# Patient Record
Sex: FEMALE | Race: WHITE | NOT HISPANIC OR LATINO | Employment: FULL TIME | URBAN - METROPOLITAN AREA
[De-identification: names, ages, dates, MRNs, and addresses within clinical notes are randomized per-mention and may not be internally consistent; named-entity substitution may affect disease eponyms.]

---

## 2018-07-02 ENCOUNTER — TRANSFERRED RECORDS (OUTPATIENT)
Dept: HEALTH INFORMATION MANAGEMENT | Facility: CLINIC | Age: 53
End: 2018-07-02

## 2018-07-04 LAB
ALBUMIN SERPL-MCNC: 4.2 G/DL (ref 3.5–5.5)
ALP SERPL-CCNC: 89 U/L (ref 39–117)
ALT SERPL-CCNC: 18 U/L (ref 0–32)
ANION GAP SERPL CALCULATED.3IONS-SCNC: ABNORMAL MMOL/L
AST SERPL-CCNC: 21 U/L (ref 0–40)
BILIRUB SERPL-MCNC: <0.2 MG/DL (ref 0–1.2)
BUN SERPL-MCNC: 14 MG/DL (ref 6–24)
CALCIUM SERPL-MCNC: 9.2 MG/DL (ref 8.7–10.2)
CHLORIDE SERPLBLD-SCNC: 102 MMOL/L (ref 96–106)
CO2 SERPL-SCNC: 21 MMOL/L (ref 20–29)
CREAT SERPL-MCNC: 0.78 MG/DL (ref 0.57–1)
GFR SERPL CREATININE-BSD FRML MDRD: ABNORMAL ML/MIN/1.73M2
GLUCOSE SERPL-MCNC: 115 MG/DL (ref 65–99)
POTASSIUM SERPL-SCNC: 4.2 MMOL/L (ref 3.5–5.2)
PROT SERPL-MCNC: 7.1 G/DL (ref 6–8.5)
SODIUM SERPL-SCNC: 141 MMOL/L (ref 134–144)
T3 SERPL-MCNC: 105 NG/DL (ref 71–180)
THYROXINE (T4) FREE, DIRECT S: 4.9 (ref 4.5–12)

## 2018-09-10 ENCOUNTER — TRANSFERRED RECORDS (OUTPATIENT)
Dept: HEALTH INFORMATION MANAGEMENT | Facility: CLINIC | Age: 53
End: 2018-09-10

## 2018-09-17 ENCOUNTER — HEALTH MAINTENANCE LETTER (OUTPATIENT)
Age: 53
End: 2018-09-17

## 2018-09-27 ENCOUNTER — CARE COORDINATION (OUTPATIENT)
Dept: CARDIOLOGY | Facility: CLINIC | Age: 53
End: 2018-09-27

## 2018-09-27 ENCOUNTER — OFFICE VISIT (OUTPATIENT)
Dept: CARDIOLOGY | Facility: CLINIC | Age: 53
End: 2018-09-27
Payer: COMMERCIAL

## 2018-09-27 VITALS
SYSTOLIC BLOOD PRESSURE: 142 MMHG | OXYGEN SATURATION: 97 % | BODY MASS INDEX: 41.95 KG/M2 | HEART RATE: 76 BPM | WEIGHT: 293 LBS | DIASTOLIC BLOOD PRESSURE: 92 MMHG | HEIGHT: 70 IN

## 2018-09-27 DIAGNOSIS — R40.0 DAYTIME SOMNOLENCE: ICD-10-CM

## 2018-09-27 DIAGNOSIS — R06.83 SNORING: ICD-10-CM

## 2018-09-27 DIAGNOSIS — I10 BENIGN ESSENTIAL HYPERTENSION: ICD-10-CM

## 2018-09-27 DIAGNOSIS — R00.2 PALPITATIONS: Primary | ICD-10-CM

## 2018-09-27 DIAGNOSIS — E03.9 HYPOTHYROIDISM, UNSPECIFIED TYPE: ICD-10-CM

## 2018-09-27 PROCEDURE — 93000 ELECTROCARDIOGRAM COMPLETE: CPT | Performed by: INTERNAL MEDICINE

## 2018-09-27 PROCEDURE — 99205 OFFICE O/P NEW HI 60 MIN: CPT | Mod: 25 | Performed by: INTERNAL MEDICINE

## 2018-09-27 RX ORDER — PHENTERMINE HYDROCHLORIDE 37.5 MG/1
37.5 CAPSULE ORAL EVERY MORNING
COMMUNITY
End: 2018-12-13

## 2018-09-27 RX ORDER — LISINOPRIL 40 MG/1
40 TABLET ORAL DAILY
Qty: 30 TABLET | Refills: 3 | Status: SHIPPED | OUTPATIENT
Start: 2018-09-27 | End: 2018-12-13

## 2018-09-27 RX ORDER — LISINOPRIL 20 MG/1
20 TABLET ORAL
COMMUNITY
End: 2018-09-27

## 2018-09-27 NOTE — LETTER
9/27/2018      Do Stevenson NP  [facility] [address]   [place] [address]      RE: Michaela Garcia       Dear Colleague,    I had the pleasure of seeing Michaela Garcia in the [facility].    Service Date: 09/27/2018      LOCATION:  [facility], [place], [place]      PRIMARY CARE AND REFERRING PROVIDER:  Do Stevenson CNP      REASON FOR VISIT:  Palpitations.      HISTORY OF PRESENT ILLNESS:  The patient is new to Cardiology.  She is a 53-year-old  lady with a BMI of 50 kg/m2, who is a mother of 3 children with special needs.  She also works outside the home.  She is a never tobacco user, drinks alcohol rarely, 1-2 servings of caffeine daily.      PAST MEDICAL HISTORY:  Significant for obesity, treated hypertension, type 2 diabetes mellitus, recurrent migraine headaches and recently diagnosed hypothyroidism.      The patient is here for review of chronic palpitations that have been ongoing on and off for several years.  They are more noticeable.  There are nonspecific with no apparent triggers, can occur at rest or with exercise, lasting a few seconds, can occur every day or on an intermittent basis.  They are not sustained, have not woke her up from sleep, not associated with chest pain, dyspnea, dizziness, presyncope or syncope.  Not known to have prior cardiac disease or arrhythmias.  As she is adopted, she is not aware of her family history.      Her blood pressure today was elevated at 142/92 mmHg.  Known to have hypertension and recently started on lisinopril.  However, she does take NSAIDs almost on a daily basis for musculoskeletal discomfort.      She has started care with an endocrinology group recently and was started on metformin and levothyroxine (borderline elevated TSH) and phentermine 37.5 mg daily for weight loss (she has been taking this for the last 2 months or so).       LABORATORY DATA:  Sodium 141, potassium 4.2, BUN 14, creatinine 0.8.      ECG done today shows sinus tachycardia of 104 BPM with normal cardiac intervals,  milliseconds, QTc 447 milliseconds.      Cardiac imaging - none available.      MEDICATIONS:   1.  Ibuprofen and Naprosyn as needed for musculoskeletal pain.  The patient takes on a daily basis.   2.  Levothyroxine 25 mcg daily.   3.  Lisinopril 40 mg daily.   4.  Metformin 500 mg 3 times daily.   5.  Phentermine 37.5 mg daily.   6.  Vitamin D.       PHYSICAL EXAMINATION:   VITAL SIGNS:  /92, pulse 76 per minute, height 1.8 meters (5 feet 10 inches), weight 157 kilograms (346 pounds), sats 97% on room air.  BMI 50 kg/m2.   CONSTITUTIONAL:  Elevated BMI, comfortable at rest.  Alert, oriented x3.   EYES:  No pallor.   ENT:  Satisfactory dentition but small oral cavity.   NECK:  Carotid pulse, thyroid and JVP could not be assessed due to body habitus.   RESPIRATORY:  Clear to auscultation.   CARDIOVASCULAR:  Limited exam due to body habitus.  Regular heart sounds, no audible murmur, S3 or S4.   GASTROINTESTINAL:  Large pannus, soft, nontender.   SKIN:  Normal.   MUSCULOSKELETAL:  Normal.   NEUROPSYCHIATRIC:  Normal.   EXTREMITIES:  No edema or clubbing.      DIAGNOSES:   1.  Palpitations.   2.  Hypertension.   3.  Unspecified hypothyroidism.   4.  Impaired glucose tolerance.   5.  Body mass index 50 kg/m2.      ASSESSMENT/PLAN:  Palpitations, chronic and nonspecific.  There is a high clinical suspicion of obstructive sleep apnea.  The hypertension may be related to daily use of NSAIDs.  She is on phentermine for weight loss which is also rarely associated with pulmonary arterial hypertension.   1.  Increase lisinopril from 20 mg to 40 mg daily.   2.  Discontinue all NSAID use as it may cause hypertension.   3.  I have suggested she talk to an endocrinologist about discontinuing phentermine due to the risk of pulmonary arterial hypertension.    4.   Additional testing - Cardiac MRI with stress, 14-day Zio Patch monitor.   5.  Sleep Clinic referral.   6.  Follow up with test results.      It was my pleasure to visit with Ms. Chapman.  I look forward to seeing her again.      cc:   Do Stevenson CNP   [facility] [address] [address]     DAWN LOVE MD      D: 10/02/2018   T: 10/02/2018   MT: al      Name:     DENIS CHAPMAN   MRN:      [id number]        Account:      [id number]   :      1965           Service Date: 2018      Document: [id number]      Outpatient Encounter Prescriptions as of 2018   Medication Sig Dispense Refill     IBUPROFEN PO Take by mouth every 4 hours as needed for moderate pain       Levothyroxine Sodium (LEVOTHROID PO) Take 25 mcg by mouth daily       lisinopril (PRINIVIL/ZESTRIL) 40 MG tablet Take 1 tablet (40 mg) by mouth daily 30 tablet 3     METFORMIN HCL PO Take 500 mg by mouth 3 times daily (with meals)       Naproxen Sod-Diphenhydramine (ALEVE PM PO)        phentermine 37.5 MG capsule Take 37.5 mg by mouth every morning       VITAMIN D, CHOLECALCIFEROL, PO Take 50,000 Units by mouth once a week       [DISCONTINUED] lisinopril (PRINIVIL/ZESTRIL) 20 MG tablet Take 20 mg by mouth       No facility-administered encounter medications on file as of 2018.      Again, thank you for allowing me to participate in the care of your patient.      Sincerely,    Dawn Love MD     [facility]

## 2018-09-27 NOTE — MR AVS SNAPSHOT
After Visit Summary   9/27/2018    Michaela Garcia    MRN: [id number]           Patient Information     Date Of Birth          1965        Visit Information        Provider Department      9/27/2018 1:15 PM Dawn Longoria MD [facility]        Today's Diagnoses     Palpitations    -  1    Benign essential hypertension        Hypothyroidism, unspecified type        Body mass index 45.0-49.9, adult (H)        Impaired glucose tolerance test          Care Instructions    MEDICATION CHANGES:  1.  Increase lisinopril from 20 mg to 40 mg daily.  Take in the morning.  2.  Please discontinue Aleve, ibuprofen etc.  These cause increased blood pressure and other major side effects.  3.  Talk to her endocrinologist about the possibility of discontinuing phentermine, due to the long-term risk of pulmonary hypertension.    ADDITIONAL TESTING AND FOLLOW-UP:  1. Cardiac MRI with stress.  2. 14 day Ziopatch monitor.  3.  Sleep clinic referral.  4. Follow up with test results.    If you have any questions or concerns, please contact my nurses at [phone].            Follow-ups after your visit        Additional Services     SLEEP EVALUATION & MANAGEMENT REFERRAL - ADULT -[facility]  [phone] (Age 18 and up)       Please be aware that coverage of these services is subject to the terms and limitations of your health insurance plan.  Call member services at your health plan with any benefit or coverage questions.      Please bring the following to your appointment:    >>   List of current medications   >>   This referral request   >>   Any documents/labs given to you for this referral                Follow-Up with Cardiologist                 Your next 10 appointments already scheduled     Sep 28, 2018  3:15 PM CDT   ZIOPATCH MONITOR with RS[facility] DEVICE [facility] ([facility])    [address]  [facility] [address]   [phone]           LOCATION - [address], [address] [place] [address] **Please check-in at the [facility], [address], in the [facility] building. When you are finished registering, please go to [address] and have a seat.            Oct 08, 2018  2:30 PM CDT   MR CARDIAC W CONTRAST AND STRESS with SCIMR1   [facility] ([facility] at [facility])    [facility] [facility]  [address]  [place] [address]   [phone]           How do I prepare for my exam? (Food and drink instructions) The day before your exam, drink extra fluids at least six 8-ounce glasses (unless your doctor wants you to limit your fluids). Stop all caffeine 12 hours before the test. This includes coffee, tea, soda, chocolate and certain medicines (such as Anacin, Excedrin and NoDoz). Also avoid decaf coffee and tea, as these contain small amounts of caffeine.  Do not eat or drink for 3 hours before your exam. You may drink water and take your morning medicines.  **If you will be receiving sedation or general anesthesia, please see special notes below.**  How do I prepare for my exam? (Other instructions) Take your medicines as usual, unless your doctor tells you not to. If you take Viagra, Levitra or Cialis, stop taking it 48 hours before your test. If you take Aggrenox or dipyridamole (Persantine, Permole), stop taking it 48 hours before your test. If you take Theophylline or Aminophylline, stop taking it 12 hours before your test. If you are diabetic and take oral hypoglycemics, do not take them on the day of your test.  You may need a blood test (creatinine test) within 30 days of your exam. Follow your doctor s orders if this is arranged before your exam.  If you are very claustrophobic, please let you doctor know. You may get a sedative medicine from your doctor before you arrive. Bring the  medicine to the exam. Do not take it at home. Arrive one hour early. Bring someone who can take you home after the test. Your medicine will make you sleepy. After the exam, you may not drive, take a bus or take a taxi by yourself.  **If you will be receiving sedation or general anesthesia, please see special notes below.**  What should I wear: The MRI machine uses a strong magnet. Please wear clothes without metal (snaps, zippers). A sweatsuit works well, or we may give you a hospital gown. Please remove any body piercings and hair extensions before you arrive. You will remove watches, jewelry, hairpins, wallets, dentures, partial dental plates and hearing aids. You may wear contact lenses, and you may be able to wear your rings. We have a safe place to keep your personal items, but it is safer to leave them at home.  How long does the exam take: Most tests take 30 to 60 minutes.  HOWEVER, IF YOUR DOCTOR PRESCRIBES ANESTHESIA please plan on spending four to five hours in the recovery room.  What should I bring:  Bring a list of your current medicines to your exam (including vitamins, minerals and over-the-counter drugs).  Do I need a : **If you will be receiving sedation or general anesthesia, please see special notes below.**  What should I do after the exam: No Restrictions, You may resume normal activities.  What is this test: MRI (magnetic resonance imaging) uses a strong magnet and radio waves to look inside the body. An MRA (magnetic resonance angiogram) does the same thing, but it lets us look at your blood vessels. A computer turns the radio waves into pictures showing cross sections of the body, much like slices of bread. This helps us see any problems more clearly. You may receive fluid (called  contrast ) before or during your scan. The fluid helps us see the pictures better. We give the fluid through an IV (small needle in your arm).  Who should I call with questions: If you have any questions,  please contact your Imaging Department exam site. Directions, parking instructions, and other information is available on our website, [email or web address].  How do I prepare if I m having sedation or anesthesia? **IMPORTANT** THE INSTRUCTIONS BELOW ARE ONLY FOR THOSE PATIENTS WHO HAVE BEEN TOLD THEY WILL RECEIVE SEDATION OR GENERAL ANESTHESIA DURING THEIR MRI PROCEDURE:  IF YOU WILL RECEIVE SEDATION (take medicine to help you relax during your exam): You must get the medicine from your doctor before you arrive. Bring the medicine to the exam. Do not take it at home. Arrive one hour early. Bring someone who can take you home after the test. Your medicine will make you sleepy. After the exam, you may not drive, take a bus or take a taxi by yourself. No eating 8 hours before your exam. You may have clear liquids up until 4 hours before your exam. (Clear liquids include water, clear tea, black coffee and fruit juice without pulp.)  IF YOU WILL RECEIVE ANESTHESIA (be asleep for your exam): Arrive 1 1/2 hours early. Bring someone who can take you home after the test. You may not drive, take a bus or take a taxi by yourself. No eating 8 hours before your exam. You may have clear liquids up until 4 hours before your exam. (Clear liquids include water, clear tea, black coffee and fruit juice without pulp.            Dec 13, 2018  8:45 AM CST   LAB with RU LAB   [facility] [place] ([facility])    [address] [address] [phone]           Please do not eat 10-12 hours before your appointment if you are coming in fasting for labs on lipids, cholesterol, or glucose (sugar). This does not apply to pregnant women. Water, hot tea and black coffee (with nothing added) are okay. Do not drink other fluids, diet soda or chew gum.            Dec 13, 2018  9:45 AM CST   Pulmonary Hypertension Return with Dawn Longoria MD   [facility]  "[facility])    [address]  [facility] [address]   [phone]              Future tests that were ordered for you today     Open Future Orders        Priority Expected Expires Ordered    Basic metabolic panel Routine 12/26/2018 9/27/2019 9/27/2018    Follow-Up with Cardiologist Routine 12/26/2018 9/27/2019 9/27/2018    Zio Patch Monitor Routine 10/4/2018 9/27/2019 9/27/2018    MRI Cardiac w/contrast and stress Routine 9/28/2018 9/27/2019 9/27/2018    SLEEP EVALUATION & MANAGEMENT REFERRAL - ADULT -[facility]  [phone] (Age 18 and up) Routine 10/4/2018 9/27/2019 9/27/2018            Who to contact     If you have questions or need follow up information about today's clinic visit or your schedule please contact [facility] directly at [phone].  Normal or non-critical lab and imaging results will be communicated to you by [employer], letter or phone within 4 business days after the clinic has received the results. If you do not hear from us within 7 days, please contact the clinic through [employer] or phone. If you have a critical or abnormal lab result, we will notify you by phone as soon as possible.  Submit refill requests through [facility] or call your pharmacy and they will forward the refill request to us. Please allow 3 business days for your refill to be completed.          Additional Information About Your Visit        [facility] Information     [facility] lets you send messages to your doctor, view your test results, renew your prescriptions, schedule appointments and more. To sign up, go to [email or web address] . Click on \"Log in\" on the left side of the screen, which will take you to the Welcome page. Then click on \"Sign up Now\" on the right side of the page.     You will be asked to enter the access code listed below, as well as some personal information. Please follow the " "directions to create your username and password.     Your access code is: [email or web address]  Expires: 2018  2:32 PM     Your access code will  in 90 days. If you need help or a new code, please call your [place] clinic or [phone].        Care EveryWhere ID     This is your Care EveryWhere ID. This could be used by other organizations to access your [place] medical records  [id number]        Your Vitals Were     Pulse Height Pulse Oximetry BMI (Body Mass Index)          76 1.778 m (5' 10\") 97% 49.65 kg/m2         Blood Pressure from Last 3 Encounters:   18 (!) 142/92    Weight from Last 3 Encounters:   18 (!) 156.9 kg (346 lb)              We Performed the Following     EKG 12-lead complete w/read - Clinics (performed today)          Today's Medication Changes          These changes are accurate as of 18  2:32 PM.  If you have any questions, ask your nurse or doctor.               These medicines have changed or have updated prescriptions.        Dose/Directions    lisinopril 40 MG tablet   Commonly known as:  PRINIVIL/ZESTRIL   This may have changed:    - medication strength  - how much to take  - when to take this   Used for:  Benign essential hypertension   Changed by:  Dawn Longoria MD        Dose:  40 mg   Take 1 tablet (40 mg) by mouth daily   Quantity:  30 tablet   Refills:  3            Where to get your medicines      These medications were sent to [facility] #[address] [place] [address]     Phone:  [phone]     lisinopril 40 MG tablet                Primary Care Provider Office Phone # Fax #    Do Stevenson [phone]       [facility] [address] [place] [address]        Equal Access to Services     DARRION GALAVIZ AH: Ton neelyo Soomaali, waaxda luqadaha, qaybta kaalmada adeegyada, waxay toñito coughlin. So [facility] " [phone].    ATENCIÓN: Si brandon piper, tiene a brooks disposición servicios gratuitos de asistencia lingüística. Shazia grijalva [phone].    We comply with applicable federal civil rights laws and [place] laws. We do not discriminate on the basis of race, color, national origin, age, disability, sex, sexual orientation, or gender identity.            Thank you!     Thank you for choosing [facility]  for your care. Our goal is always to provide you with excellent care. Hearing back from our patients is one way we can continue to improve our services. Please take a few minutes to complete the written survey that you may receive in the mail after your visit with us. Thank you!             Your Updated Medication List - Protect others around you: Learn how to safely use, store and throw away your medicines at www.disposemymeds.org.          This list is accurate as of 9/27/18  2:32 PM.  Always use your most recent med list.                   Brand Name Dispense Instructions for use Diagnosis    ALEVE PM PO           IBUPROFEN PO      Take by mouth every 4 hours as needed for moderate pain        LEVOTHROID PO      Take 25 mcg by mouth daily        lisinopril 40 MG tablet    PRINIVIL/ZESTRIL    30 tablet    Take 1 tablet (40 mg) by mouth daily    Benign essential hypertension       METFORMIN HCL PO      Take 500 mg by mouth 3 times daily (with meals)        phentermine 37.5 MG capsule      Take 37.5 mg by mouth every morning        VITAMIN D (CHOLECALCIFEROL) PO      Take 50,000 Units by mouth once a week

## 2018-09-27 NOTE — PROGRESS NOTES
Called to PMD for records on 9/26 and 9/27  And /for DR Longoria visit today.  Katia Vanegas RN 09/27/18 9:23 AM    Received records from Primary Care - reference patient seen by Endocrinology [facility] Endocrinology care.  Called to [facility] - spoke with Amelie [phone] they will fax over records as soon as we fax release to [phone].  [facility] Phone: [phone]  Katia Vanegas RN 09/27/18 11:22 AM

## 2018-09-27 NOTE — LETTER
9/27/2018    Do Stevenson NP  [facility] [address] [place] [address]    RE: Michaela Garcia       Dear Colleague,    I had the pleasure of seeing Michaela Garcia in the [facility].    Clinic visit note dictated. Dictation reference number - [id number]          REVIEW OF SYSTEMS:  A comprehensive 10-point review of systems was completed and the pertinent positives are documented in the history of present illness.    Skin:  Positive for     Eyes:  Positive for glasses  ENT:  Negative    Respiratory:  Positive for shortness of breath  Cardiovascular:    Positive for;palpitations;chest pain;edema  Gastroenterology: Positive for reflux;constipation;diarrhea  Genitourinary:  Negative    Musculoskeletal:  Positive for back pain;arthritis;joint pain  Neurologic:  Positive for headaches  Psychiatric:  Positive for excessive stress;anxiety;depression  Heme/Lymph/Imm:  Negative    Endocrine:  Positive for thyroid disorder;diabetes      PAST MEDICAL HISTORY:    Past Medical History:   Diagnosis Date     Hashimoto's disease      Hyperglycemia      Hypertension      Obesity      Palpitations        PAST SURGICAL HISTORY:    History reviewed. No pertinent surgical history.    FAMILY HISTORY:    Family History   Problem Relation Age of Onset     Unknown/Adopted Mother      Unknown/Adopted Father        SOCIAL HISTORY:    Social History     Social History     Marital status:      Spouse name: N/A     Number of children: N/A     Years of education: N/A     Social History Main Topics     Smoking status: Never Smoker     Smokeless tobacco: Never Used     Alcohol use Yes      Comment: maybe once a month      Drug use: No     Sexual activity: Not Asked     Other Topics Concern     None     Social History Narrative       PHYSICAL EXAM:    Vitals: BP (!) 142/92 (BP Location: Right arm, Patient Position: Sitting, Cuff Size: Adult Large)  Pulse 76  Ht 1.778 m (5'  "10\")  Wt (!) 156.9 kg (346 lb)  SpO2 97%  BMI 49.65 kg/m2  Wt Readings from Last 5 Encounters:   09/27/18 (!) 156.9 kg (346 lb)             Encounter Diagnoses   Name Primary?     Palpitations Yes     Benign essential hypertension      Hypothyroidism, unspecified type      Body mass index 45.0-49.9, adult (H)      Impaired glucose tolerance test        Orders Placed This Encounter   Procedures     MRI Cardiac w/contrast and stress     Basic metabolic panel     SLEEP EVALUATION & MANAGEMENT REFERRAL - ADULT -[facility]  [phone] (Age 18 and up)     Follow-Up with Cardiologist     EKG 12-lead complete w/read - Clinics (performed today)     Zio Patch Monitor       CC  REFERRING PROVIDER:  No referring provider defined for this encounter.                  Thank you for allowing me to participate in the care of your patient.      Sincerely,     Dawn Longoria MD     [facility]    cc:   No referring provider defined for this encounter.        "

## 2018-09-28 ENCOUNTER — HOSPITAL ENCOUNTER (OUTPATIENT)
Dept: CARDIOLOGY | Facility: CLINIC | Age: 53
Discharge: HOME | End: 2018-09-28
Attending: INTERNAL MEDICINE | Admitting: INTERNAL MEDICINE
Payer: COMMERCIAL

## 2018-09-28 DIAGNOSIS — E03.9 HYPOTHYROIDISM, UNSPECIFIED TYPE: ICD-10-CM

## 2018-09-28 DIAGNOSIS — R00.2 PALPITATIONS: ICD-10-CM

## 2018-09-28 DIAGNOSIS — I10 BENIGN ESSENTIAL HYPERTENSION: ICD-10-CM

## 2018-09-28 PROCEDURE — 0296T ZIO PATCH HOLTER: CPT

## 2018-09-28 PROCEDURE — 0298T ZZC EXT ECG > 48HR TO 21 DAY REVIEW AND INTERPRETATN: CPT | Performed by: INTERNAL MEDICINE

## 2018-10-02 NOTE — PROGRESS NOTES
"Clinic visit note dictated. Dictation reference number - [id number]          REVIEW OF SYSTEMS:  A comprehensive 10-point review of systems was completed and the pertinent positives are documented in the history of present illness.    Skin:  Positive for     Eyes:  Positive for glasses  ENT:  Negative    Respiratory:  Positive for shortness of breath  Cardiovascular:    Positive for;palpitations;chest pain;edema  Gastroenterology: Positive for reflux;constipation;diarrhea  Genitourinary:  Negative    Musculoskeletal:  Positive for back pain;arthritis;joint pain  Neurologic:  Positive for headaches  Psychiatric:  Positive for excessive stress;anxiety;depression  Heme/Lymph/Imm:  Negative    Endocrine:  Positive for thyroid disorder;diabetes      PAST MEDICAL HISTORY:    Past Medical History:   Diagnosis Date     Hashimoto's disease      Hyperglycemia      Hypertension      Obesity      Palpitations        PAST SURGICAL HISTORY:    History reviewed. No pertinent surgical history.    FAMILY HISTORY:    Family History   Problem Relation Age of Onset     Unknown/Adopted Mother      Unknown/Adopted Father        SOCIAL HISTORY:    Social History     Social History     Marital status:      Spouse name: N/A     Number of children: N/A     Years of education: N/A     Social History Main Topics     Smoking status: Never Smoker     Smokeless tobacco: Never Used     Alcohol use Yes      Comment: maybe once a month      Drug use: No     Sexual activity: Not Asked     Other Topics Concern     None     Social History Narrative       PHYSICAL EXAM:    Vitals: BP (!) 142/92 (BP Location: Right arm, Patient Position: Sitting, Cuff Size: Adult Large)  Pulse 76  Ht 1.778 m (5' 10\")  Wt (!) 156.9 kg (346 lb)  SpO2 97%  BMI 49.65 kg/m2  Wt Readings from Last 5 Encounters:   09/27/18 (!) 156.9 kg (346 lb)             Encounter Diagnoses   Name Primary?     Palpitations Yes     Benign essential hypertension      Hypothyroidism, " unspecified type      Body mass index 45.0-49.9, adult (H)      Impaired glucose tolerance test        Orders Placed This Encounter   Procedures     MRI Cardiac w/contrast and stress     Basic metabolic panel     SLEEP EVALUATION & MANAGEMENT REFERRAL - ADULT -[facility] [place]  [phone] (Age 18 and up)     Follow-Up with Cardiologist     EKG 12-lead complete w/read - Clinics (performed today)     Zio Patch Monitor       CC  REFERRING PROVIDER:  No referring provider defined for this encounter.

## 2018-10-08 ENCOUNTER — HOSPITAL ENCOUNTER (OUTPATIENT)
Dept: CARDIOLOGY | Facility: CLINIC | Age: 53
Discharge: HOME | End: 2018-10-08
Attending: INTERNAL MEDICINE | Admitting: INTERNAL MEDICINE
Payer: COMMERCIAL

## 2018-10-08 VITALS — HEART RATE: 84 BPM | SYSTOLIC BLOOD PRESSURE: 123 MMHG | OXYGEN SATURATION: 97 % | DIASTOLIC BLOOD PRESSURE: 71 MMHG

## 2018-10-08 DIAGNOSIS — E03.9 HYPOTHYROIDISM, UNSPECIFIED TYPE: ICD-10-CM

## 2018-10-08 DIAGNOSIS — I10 BENIGN ESSENTIAL HYPERTENSION: ICD-10-CM

## 2018-10-08 DIAGNOSIS — R00.2 PALPITATIONS: ICD-10-CM

## 2018-10-08 LAB
CREAT BLD-MCNC: 0.8 MG/DL (ref 0.52–1.04)
GFR SERPL CREATININE-BSD FRML MDRD: 75 ML/MIN/1.7M2

## 2018-10-08 PROCEDURE — 75563 CARD MRI W/STRESS IMG & DYE: CPT | Mod: 26 | Performed by: INTERNAL MEDICINE

## 2018-10-08 PROCEDURE — 93018 CV STRESS TEST I&R ONLY: CPT | Performed by: INTERNAL MEDICINE

## 2018-10-08 PROCEDURE — 25500064 ZZH RX 255 OP 636: Performed by: INTERNAL MEDICINE

## 2018-10-08 PROCEDURE — 82565 ASSAY OF CREATININE: CPT

## 2018-10-08 PROCEDURE — 25000128 H RX IP 250 OP 636: Performed by: INTERNAL MEDICINE

## 2018-10-08 PROCEDURE — A9585 GADOBUTROL INJECTION: HCPCS | Performed by: INTERNAL MEDICINE

## 2018-10-08 PROCEDURE — 93017 CV STRESS TEST TRACING ONLY: CPT

## 2018-10-08 PROCEDURE — 93016 CV STRESS TEST SUPVJ ONLY: CPT | Performed by: INTERNAL MEDICINE

## 2018-10-08 RX ORDER — METHYLPREDNISOLONE SODIUM SUCCINATE 125 MG/2ML
125 INJECTION, POWDER, LYOPHILIZED, FOR SOLUTION INTRAMUSCULAR; INTRAVENOUS
Status: DISCONTINUED | OUTPATIENT
Start: 2018-10-08 | End: 2018-10-09 | Stop reason: HOSPADM

## 2018-10-08 RX ORDER — ONDANSETRON 2 MG/ML
4 INJECTION INTRAMUSCULAR; INTRAVENOUS
Status: DISCONTINUED | OUTPATIENT
Start: 2018-10-08 | End: 2018-10-09 | Stop reason: HOSPADM

## 2018-10-08 RX ORDER — CAFFEINE 200 MG
200 TABLET ORAL
Status: DISCONTINUED | OUTPATIENT
Start: 2018-10-08 | End: 2018-10-09 | Stop reason: HOSPADM

## 2018-10-08 RX ORDER — ACYCLOVIR 200 MG/1
0-1 CAPSULE ORAL
Status: DISCONTINUED | OUTPATIENT
Start: 2018-10-08 | End: 2018-10-09 | Stop reason: HOSPADM

## 2018-10-08 RX ORDER — AMINOPHYLLINE 25 MG/ML
100 INJECTION, SOLUTION INTRAVENOUS ONCE
Status: DISCONTINUED | OUTPATIENT
Start: 2018-10-08 | End: 2018-10-09 | Stop reason: HOSPADM

## 2018-10-08 RX ORDER — ALBUTEROL SULFATE 90 UG/1
2 AEROSOL, METERED RESPIRATORY (INHALATION) EVERY 5 MIN PRN
Status: DISCONTINUED | OUTPATIENT
Start: 2018-10-08 | End: 2018-10-09 | Stop reason: HOSPADM

## 2018-10-08 RX ORDER — REGADENOSON 0.08 MG/ML
0.4 INJECTION, SOLUTION INTRAVENOUS ONCE
Status: COMPLETED | OUTPATIENT
Start: 2018-10-08 | End: 2018-10-08

## 2018-10-08 RX ORDER — CAFFEINE CITRATE 20 MG/ML
60 SOLUTION INTRAVENOUS
Status: DISCONTINUED | OUTPATIENT
Start: 2018-10-08 | End: 2018-10-09 | Stop reason: HOSPADM

## 2018-10-08 RX ORDER — DIPHENHYDRAMINE HCL 25 MG
25 CAPSULE ORAL
Status: DISCONTINUED | OUTPATIENT
Start: 2018-10-08 | End: 2018-10-09 | Stop reason: HOSPADM

## 2018-10-08 RX ORDER — GADOBUTROL 604.72 MG/ML
5-65 INJECTION INTRAVENOUS ONCE
Status: COMPLETED | OUTPATIENT
Start: 2018-10-08 | End: 2018-10-08

## 2018-10-08 RX ORDER — DIAZEPAM 5 MG
5 TABLET ORAL EVERY 30 MIN PRN
Status: DISCONTINUED | OUTPATIENT
Start: 2018-10-08 | End: 2018-10-09 | Stop reason: HOSPADM

## 2018-10-08 RX ORDER — DIPHENHYDRAMINE HYDROCHLORIDE 50 MG/ML
25-50 INJECTION INTRAMUSCULAR; INTRAVENOUS
Status: DISCONTINUED | OUTPATIENT
Start: 2018-10-08 | End: 2018-10-09 | Stop reason: HOSPADM

## 2018-10-08 RX ADMIN — REGADENOSON 0.4 MG: 0.08 INJECTION, SOLUTION INTRAVENOUS at 15:53

## 2018-10-08 RX ADMIN — GADOBUTROL 40 ML: 604.72 INJECTION INTRAVENOUS at 16:39

## 2018-10-09 ENCOUNTER — TELEPHONE (OUTPATIENT)
Dept: CARDIOLOGY | Facility: CLINIC | Age: 53
End: 2018-10-09

## 2018-10-09 NOTE — TELEPHONE ENCOUNTER
Message from Dr. Longoria:  Please let the patient know that her cardiac MRI is satisfactory.  Stress test was negative.  I look forward to seeing her at her follow-up appointment.     Thank you   Breonna Longoria MD [facility]   Cardiology       Spoke with patient to review stress MRI report as showing no ischemia. Patient states she mailed back her ziopatch yesterday. Patient is to see Dr. Longoria 12/13/18.

## 2018-10-09 NOTE — PROGRESS NOTES
Service Date: 09/27/2018      LOCATION:  [facility], [place], [place]      PRIMARY CARE AND REFERRING PROVIDER:  Do Stevenson CNP      REASON FOR VISIT:  Palpitations.      HISTORY OF PRESENT ILLNESS:    The patient is new to Cardiology.  She is a 53-year-old  lady with a BMI of 50 kg/m2, who is a mother of 3 children with special needs.  She also works outside the home.  She is a never tobacco user, drinks alcohol rarely, 1-2 servings of caffeine daily. Her medical history is significant for obesity, treated hypertension, type 2 diabetes mellitus, recurrent migraine headaches and recently diagnosed hypothyroidism.      The patient is here for review of chronic palpitations that have been ongoing on and off for several years.  They are more noticeable.  There are nonspecific, with no apparent triggers, can occur at rest or with exercise, lasting a few seconds, can occur every day or on an intermittent basis.  They are not sustained, have not woke her up from sleep, not associated with chest pain, dyspnea, dizziness, presyncope or syncope.  Not known to have prior cardiac disease or arrhythmias.  As she is adopted, she is not aware of her family history.      Her blood pressure today was elevated at 142/92 mmHg.  Known to have hypertension and recently started on lisinopril.  However, she does take NSAIDs almost on a daily basis for musculoskeletal discomfort.      She has started care with an endocrinology group recently and was started on metformin and levothyroxine (borderline elevated TSH) and phentermine 37.5 mg daily for weight loss (she has been taking this for the last 2 months or so).      Labs:  Sodium 141, potassium 4.2, BUN 14, creatinine 0.8.      ECG done today shows sinus tachycardia of 104 BPM with normal cardiac intervals,  milliseconds, QTc 447 milliseconds.      Cardiac imaging - none available.      MEDICATIONS:   1.  Ibuprofen and Naprosyn  as needed for musculoskeletal pain.  The patient takes on a daily basis.   2.  Levothyroxine 25 mcg daily.   3.  Lisinopril 40 mg daily.   4.  Metformin 500 mg 3 times daily.   5.  Phentermine 37.5 mg daily.   6.  Vitamin D.       PHYSICAL EXAMINATION:   VITAL SIGNS:  /92, pulse 76 per minute, height 1.8 meters (5 feet 10 inches), weight 157 kilograms (346 pounds), sats 97% on room air.  BMI 50 kg/m2.   CONSTITUTIONAL:  Elevated BMI, comfortable at rest.  Alert, oriented x3.   EYES:  No pallor.   ENT:  Satisfactory dentition but small oral cavity.   NECK:  Carotid pulse, thyroid and JVP could not be assessed due to body habitus.   RESPIRATORY:  Clear to auscultation.   CARDIOVASCULAR:  Limited exam due to body habitus.  Regular heart sounds, no audible murmur, S3 or S4.   GASTROINTESTINAL:  Large pannus, soft, nontender.   SKIN:  Normal.   MUSCULOSKELETAL:  Normal.   NEUROPSYCHIATRIC:  Normal.   EXTREMITIES:  No edema or clubbing.      DIAGNOSES:   1.  Palpitations.   2.  Hypertension.   3.  Unspecified hypothyroidism.   4.  Impaired glucose tolerance.   5.  Body mass index 50 kg/m2.      ASSESSMENT:   Palpitations are chronic and nonspecific.  There is a high clinical suspicion of obstructive sleep apnea.  The hypertension may be related to daily use of NSAIDs.  She is on phentermine for weight loss which is also rarely associated with pulmonary arterial hypertension.     PLAN:  1.  Increase lisinopril from 20 mg to 40 mg daily.   2.  Discontinue all NSAID use as it may cause hypertension.   3.  I have suggested she talk to an endocrinologist about discontinuing phentermine due to the risk of pulmonary arterial hypertension.    4.  Additional testing - Cardiac MRI with stress, 14-day Zio Patch monitor.   5.  Sleep Clinic referral.   6.  Follow up with test results.      It was my pleasure to visit with Ms. Garcia.  I look forward to seeing her again.      cc:   Do Stevenson, [facility]  [address] [address]         [facility] WILLIAM LOVE MD             D: 10/02/2018   T: 10/02/2018   MT: al      Name:     DENIS CHAPMAN   MRN:      [id number]        Account:      [id number]   :      1965           Service Date: 2018      Document: [id number]

## 2018-10-12 ENCOUNTER — OFFICE VISIT (OUTPATIENT)
Dept: SLEEP MEDICINE | Facility: CLINIC | Age: 53
End: 2018-10-12
Payer: COMMERCIAL

## 2018-10-12 VITALS
TEMPERATURE: 98.2 F | BODY MASS INDEX: 41.95 KG/M2 | RESPIRATION RATE: 16 BRPM | HEIGHT: 70 IN | WEIGHT: 293 LBS | SYSTOLIC BLOOD PRESSURE: 131 MMHG | HEART RATE: 71 BPM | DIASTOLIC BLOOD PRESSURE: 77 MMHG | OXYGEN SATURATION: 98 %

## 2018-10-12 DIAGNOSIS — I10 BENIGN ESSENTIAL HYPERTENSION: ICD-10-CM

## 2018-10-12 DIAGNOSIS — E03.9 HYPOTHYROIDISM, UNSPECIFIED TYPE: ICD-10-CM

## 2018-10-12 DIAGNOSIS — G47.19 EXCESSIVE DAYTIME SLEEPINESS: ICD-10-CM

## 2018-10-12 DIAGNOSIS — E66.01 MORBID OBESITY (H): ICD-10-CM

## 2018-10-12 DIAGNOSIS — R00.2 PALPITATIONS: ICD-10-CM

## 2018-10-12 DIAGNOSIS — R06.83 SNORING: Primary | ICD-10-CM

## 2018-10-12 PROCEDURE — 99205 OFFICE O/P NEW HI 60 MIN: CPT | Performed by: FAMILY MEDICINE

## 2018-10-12 RX ORDER — ZOLPIDEM TARTRATE 5 MG/1
TABLET ORAL
Qty: 1 TABLET | Refills: 0 | Status: SHIPPED | OUTPATIENT
Start: 2018-10-12 | End: 2018-12-13

## 2018-10-12 NOTE — PATIENT INSTRUCTIONS
"MY TREATMENT INFORMATION FOR SLEEP APNEA -  Michaela Garcia    DOCTOR: Boyn Mckeon MD, MPH  SLEEP CENTER : [facility]         If I haven't had a sleep study yet, what can I expect?  A personal story from Jagdish  https://www.BooRah.com/watch?v=AxPLmlRpnCs      Am I having a home sleep study?  Here is a video in case you get home and want to make sure you have done it correctly  https://www.Hubblrube.com/watch?v=SAB0K0iBfo2&feature=youtu.be      Frequently asked questions:  1. What is Obstructive Sleep Apnea (EVA)? EVA is the most common type of sleep apnea. Apnea literally means, \"without breath.\" It is characterized by repetitive pauses in breathing, despite continued effort to breathe, and is usually associated with a reduction in blood oxygen saturation. Apneas can last 10 to over 60 seconds. It is caused by narrowing or collapse of the upper airway as muscles relax during sleep. Severity of sleep apnea is determined by frequency of breathing events and their effect on your sleep and oxygen levels determined during sleep testing.     2. What are the consequences of EVA? Symptoms include: daytime sleepiness- possibly increasing the risk of falling asleep while driving, unrefreshing/restless sleep, snoring, insomnia, waking frequently to urinate, waking with heartburn or reflux, reduced concentration and memory, and morning headaches. Other health consequences may include development of high blood pressure and other cardiovascular disease in persons who are susceptible. Untreated EVA  can contribute to heart disease, stroke and diabetes.     3. What are the treatment options? In most situations, sleep apnea is a lifelong disease that must be managed with daily therapy. Medications are not effective for sleep apnea and surgery is generally not performed until other therapies have been tried. Therapy is usually tailored to the individual patient based on many factors including your wishes as " well as severity of sleep apnea and severity of obesity. Continuous Positive Airway (CPAP) is the most reliable treatment. An oral device to hold your jaw forward is usually the next most reliable option. Other options include postioning devices (to keep you off your back), weight loss, and surgery including a tongue pacing device. There is more detail about some of these options below.            1. CPAP-  WHAT DOES IT DO AND HOW CAN I LEARN TO WEAR IT?                               BEFORE I START, CAN I WATCH A MOVIE TO GET A PLAN ON HOW TO USE CPAP?  https://www.StarbuckLabs2.com/watch?u=m1T64yi013Y      Continuous positive airway pressure, or CPAP, is the most effective treatment for obstructive sleep apnea. It works by blowing room air, through a mask, to hold your throat open. A decision to use CPAP is a major step forward in the pursuit of a healthier life. The successful use of CPAP will help you breathe easier, sleep better and live healthier. You can choose CPAP equipment from any durable medical equipment provider that meets your needs.  Using CPAP can be a positive experience if you keep these kim points in mind:  1. Commitment  CPAP is not a quick fix for your problem. It involves a long-term commitment to improve your sleep and your health.    2. Communication  Stay in close communication with both your sleep doctor and your CPAP supplier. Ask lots of questions and seek help when you need it.    3. Consistency  Use CPAP all night, every night and for every nap. You will receive the maximum health benefits from CPAP when you use it every time that you sleep. This will also make it easier for your body to adjust to the treatment.    4. Correction  The first machine and mask that you try may not be the best ones for you. Work with your sleep doctor and your CPAP supplier to make corrections to your equipment selection. Ask about trying a different type of machine or mask if you have ongoing problems. Make sure  "that your mask is a good fit and learn to use your equipment properly.    5. Challenge  Tell a family member or close friend to ask you each morning if you used your CPAP the previous night. Have someone to challenge you to give it your best effort.    6. Connection   Your adjustment to CPAP will be easier if you are able to connect with others who use the same treatment. Ask your sleep doctor if there is a support group in your area for people who have sleep apnea, or look for one on the Internet.  7. Comfort   Increase your level of comfort by using a saline spray, decongestant or heated humidifier if CPAP irritates your nose, mouth or throat. Use your unit's \"ramp\" setting to slowly get used to the air pressure level. There may be soft pads you can buy that will fit over your mask straps. Look on www.CPAP.com for accessories that can help make CPAP use more comfortable.  8. Cleaning   Clean your mask, tubing and headgear on a regular basis. Put this time in your schedule so that you don't forget to do it. Check and replace the filters for your CPAP unit and humidifier.    9. Completion   Although you are never finished with CPAP therapy, you should reward yourself by celebrating the completion of your first month of treatment. Expect this first month to be your hardest period of adjustment. It will involve some trial and error as you find the machine, mask and pressure settings that are right for you.    10. Continuation  After your first month of treatment, continue to make a daily commitment to use your CPAP all night, every night and for every nap.    CPAP-Tips to starting with success:  Begin using your CPAP for short periods of time during the day while you watch TV or read.    Use CPAP every night and for every nap. Using it less often reduces the health benefits and makes it harder for your body to get used to it.    Make small adjustments to your mask, tubing, straps and headgear until you get the right " fit. Tightening the mask may actually worsen the leak.  If it leaves significant marks on your face or irritates the bridge of your nose, it may not be the best mask for you.  Speak with the person who supplied the mask and consider trying other masks. Insurances will allow you to try different masks during the first month of starting CPAP.  Insurance also covers a new mask, hose and filter about every 6 months.    Use a saline nasal spray to ease mild nasal congestion. Neti-Pot or saline nasal rinses may also help. Nasal gel sprays can help reduce nasal dryness.  Biotene mouthwash can be helpful to protect your teeth if you experience frequent dry mouth.  Dry mouth may be a sign of air escaping out of your mouth or out of the mask in the case of a full face mask.  Speak with your provider if you expect that is the case.     Take a nasal decongestant to relieve more severe nasal or sinus congestion.  Do not use Afrin (oxymetazoline) nasal spray more than 3 days in a row.  Speak with your sleep doctor if your nasal congestion is chronic.    Use a heated humidifier that fits your CPAP model to enhance your breathing comfort. Adjust the heat setting up if you get a dry nose or throat, down if you get condensation in the hose or mask.  Position the CPAP lower than you so that any condensation in the hose drains back into the machine rather than towards the mask.    Try a system that uses nasal pillows if traditional masks give you problems.    Clean your mask, tubing and headgear once a week. Make sure the equipment dries fully.    Regularly check and replace the filters for your CPAP unit and humidifier.    Work closely with your sleep provider and your CPAP supplier to make sure that you have the machine, mask and air pressure setting that works best for you. It is better to stop using it and call your provider to solve problems than to lay awake all night frustrated with the device.      BESIDES CPAP, WHAT OTHER  THERAPIES ARE THERE?        Positioning Device  Positioning devices are generally used when sleep apnea is mild and only occurs on your back.This example shows a pillow that straps around the waist. It may be appropriate for those whose sleep study shows milder sleep apnea that occurs primarily when lying flat on one's back. Preliminary studies have shown benefit but effectiveness at home may need to be verified by a home sleep test. These devices are generally not covered by medical insurance.                        Oral Appliance  What is oral appliance therapy?  An oral appliance is a small acrylic device that fits over the upper and lower teeth or tongue (similar to an orthodontic retainer or a mouth guard). This device slightly advances the lower jaw or tongue, which moves the base of the tongue forward, opens the airway, improves breathing and can effectively treat snoring and obstructive sleep apnea sleep apnea. The appliance is fabricated and customized by a qualified dentist with experience in treating snoring and sleep apnea. Oral appliances are usually well tolerated and have relatively high compliance by patients1, 2, 3.  When is an oral appliance indicated?  Oral appliance therapy is recommended as a first-line treatment for patients with primary snoring, mild sleep apnea, and for patients with moderate sleep apnea who prefer appliance therapy to use of CPAP4, 5. Severity of sleep apnea is determined by sleep testing and is based on the number of respiratory events per hour of sleep.   How successful is oral appliance therapy?  The success rate of oral appliance therapy in patients with mild sleep apnea is 75-80% while in patients with moderate sleep apnea it is 50-70%. The chance of success in patients with severe sleep apnea is 40-50%. The research also shows that oral appliances have a beneficial effect on the cardiovascular health of EVA patients at the same magnitude as CPAP therapy7.  Oral  appliances should be a second-line treatment in cases of severe sleep apnea, but if not completely successful then a combination therapy utilizing CPAP plus oral appliance therapy may be effective. Oral appliances tend to be effective in a broad range of patients although studies show that the patients who have the highest success are females, younger patients, those with milder disease, and less severe obesity. 3, 6.   The chances of success are lower in patients who have more severe EVA, are older, and those who are morbidly obese.     Example of an oral appliance   Finding a dentist that practices dental sleep medicine  Specific training is available through the American Academy of Dental Sleep Medicine for dentists interested in working in the field of sleep. To find a dentist who is educated in the field of sleep and the use of oral appliances, near you, visit the Web site of the American Academy of Dental Sleep Medicine; also see   http://www.accpstorage.org/newOrganization/patients/oralAppliances.pdf  To search for a dentist certified in these practices:  Http://aadsm.org/FindADentist.aspx?1  1. Silva et al. Objectively measured vs self-reported compliance during oral appliance therapy for sleep-disordered breathing. Chest 2013; 144(5): 6047-8021.  2. Kitty et al. Objective measurement of compliance during oral appliance therapy for sleep-disordered breathing. Thorax 2013; 68(1): 91-96.  3. Trevor, et al. Mandibular advancement devices in 620 men and women with EVA and snoring: tolerability and predictors of treatment success. Chest 2004; 125: 9568-9325.  4. Merlin, et al. Oral appliances for snoring and EVA: a review. Sleep 2006; 29: 244-262.  5. Oralia, et al. Oral appliance treatment for EVA: an update. J Clin Sleep Med 2014; 10(2): 215-227.  6. Sandy et al. Predictors of OSAH treatment outcome. J Dent Res 2007; 86: 1834-7667.      Weight Loss:    Weight management is a personal  decision.  If you are interested in exploring weight loss strategies, the following discussion covers the impact on weight loss on sleep apnea and the approaches that may be successful.    Weight loss decreases severity of sleep apnea in most people with obesity. For those with mild obesity who have developed snoring with weight gain, even 15-30 pound weight loss can improve and occasionally eliminate sleep apnea.  Structured and life-long dietary and health habits are necessary to lose weight and keep healthier weight levels.     Though there may be significant health benefits from weight loss, long-term weight loss is very difficult to achieve- studies show success with dietary management in less than 10% of people. In addition, substantial weight loss may require years of dietary control and may be difficult if patients have severe obesity. In these cases, surgical management may be considered.  Finally, older individuals who have tolerated obesity without health complications may be less likely to benefit from weight loss strategies.    Your BMI is Body mass index is 48.5 kg/(m^2).  Body mass index (BMI) is one way to tell whether you are at a healthy weight, overweight, or obese. It measures your weight in relation to your height.  A BMI of 18.5 to 24.9 is in the healthy range. A person with a BMI of 25 to 29.9 is considered overweight, and someone with a BMI of 30 or greater is considered obese. More than two-thirds of American adults are considered overweight or obese.  Being overweight or obese increases the risk for further weight gain. Excess weight may lead to heart disease and diabetes.  Creating and following plans for healthy eating and physical activity may help you improve your health.  Weight control is part of healthy lifestyle and includes exercise, emotional health, and healthy eating habits. Careful eating habits lifelong are the mainstay of weight control. Though there are significant health  benefits from weight loss, long-term weight loss with diet alone may be very difficult to achieve- studies show long-term success with dietary management in less than 10% of people. Attaining a healthy weight may be especially difficult to achieve in those with severe obesity. In some cases, medications, devices and surgical management might be considered.  What can you do?  If you are overweight or obese and are interested in methods for weight loss, you should discuss this with your provider.     Consider reducing daily calorie intake by 500 calories.     Keep a food journal.     Avoiding skipping meals, consider cutting portions instead.    Diet combined with exercise helps maintain muscle while optimizing fat loss. Strength training is particularly important for building and maintaining muscle mass. Exercise helps reduce stress, increase energy, and improves fitness. Increasing exercise without diet control, however, may not burn enough calories to loose weight.       Start walking three days a week 10-20 minutes at a time    Work towards walking thirty minutes five days a week     Eventually, increase the speed of your walking for 1-2 minutes at time    In addition, we recommend that you review healthy lifestyles and methods for weight loss available through the National Institutes of Health patient information sites:  http://win.niddk.nih.gov/publications/index.htm    And look into health and wellness programs that may be available through your health insurance provider, employer, local community center, or ankit club.    Surgery:    Upper Airway Surgery for EVA  Surgery for EVA is a second-line treatment option in the management of sleep apnea.  Surgery should be considered for patients who are having a difficult time tolerating CPAP.    Surgery for EVA is directed at areas that are responsible for narrowing or complete obstruction of the airway during sleep.  There are a wide range of procedures available to  enlarge and/or stabilize the airway to prevent blockage of breathing in the three major areas where it can occur: the palate, tongue, and nasal regions.  Successful surgical treatment depends on the accurate identification of the factors responsible for obstructive sleep apnea in each person.  A personalized approach is required because there is no single treatment that works well for everyone.  Because of anatomic variation, consultation with an examination by a sleep surgeon is a critical first step in determining what surgical options are best for each patient.  In some cases, examination during sedation may be recommended in order to guide the selection of procedures.  Patients will be counseled about risks and benefits as well as the typical recovery course after surgery. Surgery is typically not a cure for a person s EVA.  However, surgery will often significantly improve one s EVA severity (termed  success rate ).  Even in the absence of a cure, surgery will decrease the cardiovascular risk associated with OSA7; improve overall quality of life8 (sleepiness, functionality, sleep quality, etc).          Palate Procedures:  Patients with EVA often have narrowing of their airway in the region of their tonsils and uvula.  The goals of palate procedures are to widen the airway in this region as well as to help the tissues resist collapse.  Modern palate procedure techniques focus on tissue conservation and soft tissue rearrangement, rather than tissue removal.  Often the uvula is preserved in this procedure. Residual sleep apnea is common in patient after pharyngoplasty with an average reduction in sleep apnea events of 33%2.      Tongue Procedures:  While patients are awake, the muscles that surround the throat are active and keep this region open for breathing. These muscles relax during sleep, allowing the tongue and other structures to collapse and block breathing.  There are several different tongue procedures  available.  Selection of a tongue base procedure depends on characteristics seen on physical exam.  Generally, procedures are aimed at removing bulky tissues in this area or preventing the back of the tongue from falling back during sleep.  Success rates for tongue surgery range from 50-62%3.    Hypoglossal Nerve Stimulation:  Hypoglossal nerve stimulation has recently received approval from the [place] Food and Drug Administration for the treatment of obstructive sleep apnea.  This is based on research showing that the system was safe and effective in treating sleep apnea6.  Results showed that the median AHI score decreased 68%, from 29.3 to 9.0. This therapy uses an implant system that senses breathing patterns and delivers mild stimulation to airway muscles, which keeps the airway open during sleep.  The system consists of three fully implanted components: a small generator (similar in size to a pacemaker), a breathing sensor, and a stimulation lead.  Using a small handheld remote, a patient turns the therapy on before bed and off upon awakening.    Candidates for this device must be greater than 22 years of age, have moderate to severe EVA (AHI between 20-65), BMI less than 32, have tried CPAP/oral appliance without success, and have appropriate upper airway anatomy (determined by a sleep endoscopy performed by Dr. Holman).    Hypoglossal Nerve Stimulation Pathway:    The sleep surgeon s office will work with the patient through the insurance prior-authorization process (including communications and appeals).    Nasal Procedures:  Nasal obstruction can interfere with nasal breathing during the day and night.  Studies have shown that relief of nasal obstruction can improve the ability of some patients to tolerate positive airway pressure therapy for obstructive sleep apnea1.  Treatment options include medications such as nasal saline, topical corticosteroid and antihistamine sprays, and oral medications such  as antihistamines or decongestants. Non-surgical treatments can include external nasal dilators for selected patients. If these are not successful by themselves, surgery can improve the nasal airway either alone or in combination with these other options.    Combination Procedures:  Combination of surgical procedures and other treatments may be recommended, particularly if patients have more than one area of narrowing or persistent positional disease.  The success rate of combination surgery ranges from 66-80%2,3.      1. Neha PATTERSON. The Role of the Nose in Snoring and Obstructive Sleep Apnoea: An Update.  Eur Arch Otorhinolaryngol. 2011; 268: 1365-73.  2.  Neal SM; Gutierrez JA; Ce JR; Pallanch JF; Sapna MB; Jordan SG; Tsering TALAMANTES. Surgical modifications of the upper airway for obstructive sleep apnea in adults: a systematic review and meta-analysis. SLEEP 2010;33(10):0713-2510. Mendel NATION. Hypopharyngeal surgery in obstructive sleep apnea: an evidence-based medicine review.  Arch Otolaryngol Head Neck Surg. 2006 Feb;132(2):206-13.  3. Dragan SALGADOH1, Antwon Y, Mulugeta CHRISTIAN. The efficacy of anatomically based multilevel surgery for obstructive sleep apnea. Otolaryngol Head Neck Surg. 2003 Oct;129(4):327-35.  4. Mendel NATION, Goldberg A. Hypopharyngeal Surgery in Obstructive Sleep Apnea: An Evidence-Based Medicine Review. Arch Otolaryngol Head Neck Surg. 2006 Feb;132(2):206-13.  5. Meka TRACY et al. Upper-Airway Stimulation for Obstructive Sleep Apnea.  N Engl J Med. 2014 Jan 9;370(2):139-49.  6. Reuben Y et al. Increased Incidence of Cardiovascular Disease in Middle-aged Men with Obstructive Sleep Apnea. Am J Respir Crit Care Med; 2002 166: 159-165  7. Russell HARRIS et al. Studying Life Effects and Effectiveness of Palatopharyngoplasty (SLEEP) study: Subjective Outcomes of Isolated Uvulopalatopharyngoplasty. Otolaryngol Head Neck Surg. 2011; 144: 623-631.  Please, schedule sleep study and follow up visit with the nurse (she will  coming into the room and meet with you). Use sleeping aid only if needed during the night of the study.    Thank you!    Bony Mckeon MD, MPH  Clinical Sleep and Occupational / Environmental Medicine

## 2018-10-12 NOTE — PROGRESS NOTES
"[facility]  Outpatient Sleep Medicine Consultation  October 12, 2018    Name: Michaela Garcia MRN# [id number]   Age: 53 year old YOB: 1965     Date of Consultation: October 12, 2018  Consultation is requested by: Dawn Longoria MD  [address] [place] [id number]  Primary care provider: Do Stevenson    Patient is accompanied by: Patient presents alone today.       Reason for Sleep Consult:     Michaela Garcia is a 53 year old female patient that presents here for an initial evaluation due to \"snoring and palpitations.\"         Assessment and Plan:     Summary Sleep Diagnoses:    (1) Snoring  (2) Palpitations  (3) HTN  (4) EDS  (5) Morbid Obesity    Summary Recommendations / Discussion:    This patient has sxs, hx, and physical findings that suggest the diagnosis of a sleep disordered breathing. In addition, she has a high pre-test probability of EVA. Given this patient's body habitus, a portable HST sleep study should not be performed and it would not be the best testing alternative for this patient. Instead, this patient should undergo an in-lab split-night PSG sleep study. Based on the patient's history, clinical presentation, and today's physical examination, there is a reasonable level of suspicion for hypoventilation and, therefore, TCM monitoring as well as ABG studies would be necessary for a complete evaluation (ABG only if TCM suggests hypoventilation). Today, the nature and pathophysiology of EVA were discussed. The different treatment options for EVA were also reviewed and explained today. The patient was given zolpidem 5 mg to use only during the night of the study and only if needed (medication side effects and possible complications discussed). Lifestyle recommendations including healthy dietary and exercising habits were discussed (weight loss discussed and encouraged). Pt will follow up with us after having completed an in-lab PSG sleep " study.    Visit Summary:   -In-lab PSG sleep study with TCM and, if needed, ABG   -Zolpidem 5 mg at bedtime PRN for the night of the PSG sleep study only   -Weight loss discussed and encouraged   -Follow up with us after completing PSG sleep study    Coding:  (R06.83) Snoring  (primary encounter diagnosis)  (R00.2) Palpitations  (I10) Benign essential hypertension  (E03.9) Hypothyroidism, unspecified type  (G47.19) Excessive daytime sleepiness  (E66.01) Morbid obesity (H)    Counseling included a comprehensive review of diagnostic and therapeutic strategies as well as risks of inadequate therapy.    Educational materials provided in instructions. The patient was instructed to avoid driving or operating any heavy machinery when experiencing drowsiness.    All questions and concerns were addressed today. Pt agrees and understands the assessment and plan.         History of Present Illness:   Michaela Garcia is a 53 year old  RHD female pt with PMH of asthma, hypothyroidism, prediabetes mellitus, palpitations, arthritis of knees, and morbid obesity presents for an initial evaluation today due to snoring.    This patient reports daily nocturnal mild snoring without any gasping / choking for air or witnessed apneas. The patient also endorses non-restorative sleep with sleep inertia. She also admits having some mild EDS with occasional inadvertent naps (e.g., sitting down in front of the computer). This patient admits having some xerostomia. Patient reports some morning cephalgia localized in the occipital area. Headaches last for about one hour after waking up. The patient reports having the cephalgia about 2 days a week. Pt reports GERD sxs during the night. The patient denies any morning myalgias, CP, SOB, N/V, diarrhea, nocturia, nocturnal coughing spells, positional dyspnea, or orthopnea. The patient sleeps with one pillow under her head without any elevation of the head of the bed. Lastly, this patient  denies any drowsiness when driving with no near accidents.     PREVIOUS IN- LAB or HOME SLEEP STUDIES: None Reported    SLEEP-WAKE SCHEDULE:     Michaela MACEDO Jose      -Describes herself as a night person; prefers to go to sleep at 2:00 AM and wakes up at 10:00 AM.      -Pt takes daily naps and these lasts 30 minutes (feels refreshed after taking a nap); takes some inadvertent naps.      -ON WEEKDAYS, goes to sleep at 10:00 PM during the week; awakens 6:30 AM with an alarm; falls asleep in 15 minutes; denies difficulty falling asleep.      -ON WEEKENDS, goes to sleep at 10:00 PM and wakes up at 7:00 AM with an alarm; falls asleep in 15 minutes.        -Awakens 2-4 times a night for 15 minutes before falling back to sleep; awakens to go to the bathroom.      BEDTIME ACTIVITIES AND SHIFT WORK:    Michaela MACEDO Garcia     -Bedtime Activities and Other Sleeping Information: Pt lives with  and 4 children. Pt sleeps alone on a cory size bed ( sleeps during the day). Pt sleeps on her right side.      -Occupation: [employer]. Pt works day shifts.    -Working Hours: 9 AM to 2 PM     SCALES        -SLEEP APNEA: Stopbang score: 6 / 8        -SLEEPINESS: Baisden sleepiness scale (ESS): 10 / 24    Drowsy driving / near accidents: Denies any near accidents    Consequences: Non-restorative sleep with some EDS    SLEEP COMPLAINTS:  Cardio-respiratory     -Snoring: Significant snoring reported    -Dyspnea: Pt denies having any witnessed apneas or dyspnea   -Morning headaches or confusion: See description above   -Coexisting Lung disease: Asthma     -Coexisting Heart disease: Denies diagnosed or known cardiovascular disease at this time     -Does patient have a bed partner: Patient sleeps with spouse   -Has bed partner been sleeping separately because of snoring:  No            RLS Screen:    -When you try to relax in the evening or sleep at night, do you ever have unpleasant, restless feelings in your legs that can be  relieved by walking or movement? None Reported     -Periodic limb movement: None Reported    Narcolepsy:     - Denies sudden urges of sleep attacks   - Denies cataplexy   - Denies sleep paralysis    - Denies hallucinations    - Admits feeling refreshed after a nap.    Sleep Behaviors:   - Denies leg symptoms/movements   - Denies motor restlessness   - Denies night terrors   - Denies bruxism   - Denies automatic behaviors    Other Subjective Complaints:   - Denies anxiety or rumination    - Denies pain and discomfort at night   - Denies waking up with heart pounding or racing   - Admits having GERD or aspiration during the night         Parasomnia:    -NREM - Denies recurrent persistent confusional arousal, night eating, sleep walking or sleep terrors.      -REM - Denies dream enactment or injuries.          Medications:     Current Outpatient Prescriptions   Medication Sig     IBUPROFEN PO Take by mouth every 4 hours as needed for moderate pain     Levothyroxine Sodium (LEVOTHROID PO) Take 25 mcg by mouth daily     lisinopril (PRINIVIL/ZESTRIL) 40 MG tablet Take 1 tablet (40 mg) by mouth daily     METFORMIN HCL PO Take 500 mg by mouth 3 times daily (with meals)     Naproxen Sod-Diphenhydramine (ALEVE PM PO)      phentermine 37.5 MG capsule Take 37.5 mg by mouth every morning     VITAMIN D, CHOLECALCIFEROL, PO Take 50,000 Units by mouth once a week     No current facility-administered medications for this visit.       No Known Allergies         Past Medical History:     Denies needing any 02 supplement at night.    Past Medical History:   Diagnosis Date     Hashimoto's disease      Hyperglycemia      Hypertension      Obesity      Palpitations            Past Surgical History:    Admits previous upper airway surgery.     No past surgical history on file.         Social History:     Social History   Substance Use Topics     Smoking status: Never Smoker     Smokeless tobacco: Never Used     Alcohol use Yes       Comment: maybe once a month      Chemical History:     Tobacco: Never smoked     Uses no caffeine.    Supplements for wakefulness: Patient does not use any supplements to stay awake    EtOH: None Reported  Recreational Drugs: Patient denies using any recreational drugs     Psych Hx:   Current dangers to self or others: No. Pt denies any SI / HI, hallucinations, or delusions         Family History:     Family History   Problem Relation Age of Onset     Unknown/Adopted Mother      Unknown/Adopted Father       Pt is adopted.    Sleep Family Hx:       RLS - None reported   EVA - Mother used to snore  Insomnia - None reported  Parasomnia - None reported         Review of Systems:     A complete 10 point review of systems was negative other than HPI or as commented below:     CONSTITUTIONAL: NEGATIVE for weight gain/loss, fever, chills, sweats or night sweats, drug allergies.  EYES: NEGATIVE for changes in vision, blind spots, double vision.  ENT: NEGATIVE for ear pain, sore throat, sinus pain, post-nasal drip, runny nose, bloody nose  CARDIAC: NEGATIVE for chest pain or pressure, breathlessness when lying flat, swollen legs or swollen feet. The patient reports past heartbeats / fluttering in chest.  NEUROLOGIC: NEGATIVE headaches, weakness or numbness in the arms or legs.  DERMATOLOGIC: NEGATIVE for rashes, new moles or change in mole(s)  PULMONARY: NEGATIVE SOB at rest, SOB with activity, dry cough, productive cough, coughing up blood, wheezing or whistling when breathing.   GASTROINTESTINAL: NEGATIVE for nausea or vomitting, loose or watery stools, fat or grease in stools, constipation, abdominal pain, bowel movements black in color or blood noted.  GENITOURINARY: NEGATIVE for pain during urination, blood in urine, urinating more frequently than usual, irregular menstrual periods.  MUSCULOSKELETAL: NEGATIVE for muscle pain, bone or joint pain, swollen joints.  ENDOCRINE: NEGATIVE for increased thirst or urination,  "diabetes.  LYMPHATIC: NEGATIVE for swollen lymph nodes, lumps or bumps in the breasts or nipple discharge.         Physical Examination:   /77  Pulse 71  Temp 98.2  F (36.8  C) (Oral)  Resp 16  Ht 1.778 m (5' 10\")  Wt (!) 153.3 kg (338 lb)  SpO2 98%  BMI 48.5 kg/m2     VS: Reviewed and normal.  General: Alert, oriented, not in distress. Dressed casually; Good eye contact; Comfortably sitting in a chair; in no apparent distress  HEENT: Normocephalic and atraumatic; NL TM x 2; pupils are isocoric and equally responsive to the light. PERRLA. EOMI. Normal fundoscopic examination; Nasal turbinates are normal with a normal septal alignment;  Mallampati score: Grade IV; Tonsillar hypertrophy: 0  surgically removed; Pharynx with no erythema or exudates. Some malocclusion noted.  NECK: Neck supple; symmetrical; no lymphadenopathy; no thyromegaly, bruit, JVD noted. Neck circumference of 43 cm.  Lungs: Both hemithoraces are symmetrical, normal to palpation, no dullness to percussion, auscultation of lungs revealed normal breath sounds with no expirium prolongation, wheezing, rhonci and crackles.  CVS: Normal S1 and S2 heart sounds with no extra heart sounds. No murmur, rubs, or clicks. Normal peripheral pulses throughout with some peripheral edema noted.  Abdomen: Bowel sounds present. Abdomen is soft, non-tender, and non-distended. No organomegaly, ascitis, or obvious masses noted. Negative CVA tenderness.  Extremities/musculoskeletal: No deformity, cyanosis, or clubbing noted. Varicose veins in the left lower extremity noted.  Neurology: Awake, alert, and oriented x 3. No obvious gross motor / sensorial deficits with normal strength in all extremities at 5/5 and normal sensation throughout. Cranial nerves are grossly intact with normal II to XII CN functions. Negative Romberg's test with normal gait. DTR are symmetric and normal at 2+/4.  Integumentary: No obvious skin rash.  Psychiatry: Mood and affect are " appropriate. Euthymic with affect congruent with full range and intensity. No SI/HI with adequate insight and judgement.          Data: All pertinent previous laboratory data reviewed     No results found for: PH, PHARTERIAL, PO2, [id number], SAT, PCO2, HCO3, BASEEXCESS, JAMIE, BEB  No results found for: TSH  Lab Results   Component Value Date     (A) 07/04/2018     No results found for: HGB  Lab Results   Component Value Date    BUN 14 07/04/2018    CR 0.78 07/04/2018     Echocardiology: None Available    Chest x-ray: None Available    PFT: None Available    Laboratory Studies:   Component Value Flag Ref Range Units Status Collected Lab   Sodium 141  134 - 144 mmol/L Final 07/04/2018 12:00 AM 1791   Potassium 4.2  3.5 - 5.2 mmol/L Final 07/04/2018 12:00 AM 1791   Chloride 102  96 - 106 mmol/L Final 07/04/2018 12:00 AM 1791   Carbon Dioxide 21  20 - 29 mmol/L Final 07/04/2018 12:00 AM 1791   Anion Gap    mmol/L  07/04/2018 12:00 AM 1791   Glucose 115 (A) 65 - 99 mg/dL Final 07/04/2018 12:00 AM 1791   Urea Nitrogen 14  6 - 24 mg/dL Final 07/04/2018 12:00 AM 1791   Creatinine 0.78  0.57 - 1.00 mg/dL Final 07/04/2018 12:00 AM 1791   Calcium 9.2  8.7 - 10.2 mg/dL Final 07/04/2018 12:00 AM 1791   Protein Total 7.1  6.0 - 8.5 g/dL Final 07/04/2018 12:00 AM 1791   Albumin 4.2  3.5 - 5.5 g/dL Final 07/04/2018 12:00 AM 1791   Bilirubin Total <0.2  0.0 - 1.2 mg/dL Final 07/04/2018 12:00 AM 1791   Alkaline Phosphatase 89  39 - 117 U/L Final 07/04/2018 12:00 AM 1791   AST 21  0 - 40 U/L Final 07/04/2018 12:00 AM 1791   ALT 18  0 - 32 U/L Final 07/04/2018 12:00 AM 1791   GFR Estimate    ml/min/1.73m2  07/04/2018 12:00 AM 1791   GFR Estimate If Black    ml/min/1.73m2  07/04/2018 12:00 AM 1791     Bony Mckeon MD, MPH  Clinical Sleep Medicine    Total time spent with patient: 60 min. Over >50% of the time was spent for face to face counseling, education, and evaluation.

## 2018-10-12 NOTE — MR AVS SNAPSHOT
"              After Visit Summary   10/12/2018    Michaela Garcia    MRN: [id number]           Patient Information     Date Of Birth          1965        Visit Information        Provider Department      10/12/2018 2:30 PM Bony Mckeon MD [facility] [place]        Today's Diagnoses     Snoring    -  1    Palpitations        Benign essential hypertension        Hypothyroidism, unspecified type        Excessive daytime sleepiness        Morbid obesity (H)          Care Instructions    MY TREATMENT INFORMATION FOR SLEEP APNEA -  Michaela MACEDO Garcia    DOCTOR: Bony Mckeon MD, MPH  SLEEP CENTER : [facility]         If I haven't had a sleep study yet, what can I expect?  A personal story from [employer]  https://www.[employer].com/watch?v=AxPLmlRpnCs      Am I having a home sleep study?  Here is a video in case you get home and want to make sure you have done it correctly  https://www.[employer].com/watch?v=HPB8B3rTxf7&feature=youtu.be      Frequently asked questions:  1. What is Obstructive Sleep Apnea (EVA)? EVA is the most common type of sleep apnea. Apnea literally means, \"without breath.\" It is characterized by repetitive pauses in breathing, despite continued effort to breathe, and is usually associated with a reduction in blood oxygen saturation. Apneas can last 10 to over 60 seconds. It is caused by narrowing or collapse of the upper airway as muscles relax during sleep. Severity of sleep apnea is determined by frequency of breathing events and their effect on your sleep and oxygen levels determined during sleep testing.     2. What are the consequences of EVA? Symptoms include: daytime sleepiness- possibly increasing the risk of falling asleep while driving, unrefreshing/restless sleep, snoring, insomnia, waking frequently to urinate, waking with heartburn or reflux, reduced concentration and memory, and morning headaches. Other health consequences may include development of " high blood pressure and other cardiovascular disease in persons who are susceptible. Untreated EVA  can contribute to heart disease, stroke and diabetes.     3. What are the treatment options? In most situations, sleep apnea is a lifelong disease that must be managed with daily therapy. Medications are not effective for sleep apnea and surgery is generally not performed until other therapies have been tried. Therapy is usually tailored to the individual patient based on many factors including your wishes as well as severity of sleep apnea and severity of obesity. Continuous Positive Airway (CPAP) is the most reliable treatment. An oral device to hold your jaw forward is usually the next most reliable option. Other options include postioning devices (to keep you off your back), weight loss, and surgery including a tongue pacing device. There is more detail about some of these options below.            1. CPAP-  WHAT DOES IT DO AND HOW CAN I LEARN TO WEAR IT?                               BEFORE I START, CAN I WATCH A MOVIE TO GET A PLAN ON HOW TO USE CPAP?  https://www.[employer]ino.com/watch?q=v1S01kq549N      Continuous positive airway pressure, or CPAP, is the most effective treatment for obstructive sleep apnea. It works by blowing room air, through a mask, to hold your throat open. A decision to use CPAP is a major step forward in the pursuit of a healthier life. The successful use of CPAP will help you breathe easier, sleep better and live healthier. You can choose CPAP equipment from any durable medical equipment provider that meets your needs.  Using CPAP can be a positive experience if you keep these kim points in mind:  1. Commitment  CPAP is not a quick fix for your problem. It involves a long-term commitment to improve your sleep and your health.    2. Communication  Stay in close communication with both your sleep doctor and your CPAP supplier. Ask lots of questions and seek help when you need  "it.    3. Consistency  Use CPAP all night, every night and for every nap. You will receive the maximum health benefits from CPAP when you use it every time that you sleep. This will also make it easier for your body to adjust to the treatment.    4. Correction  The first machine and mask that you try may not be the best ones for you. Work with your sleep doctor and your CPAP supplier to make corrections to your equipment selection. Ask about trying a different type of machine or mask if you have ongoing problems. Make sure that your mask is a good fit and learn to use your equipment properly.    5. Challenge  Tell a family member or close friend to ask you each morning if you used your CPAP the previous night. Have someone to challenge you to give it your best effort.    6. Connection   Your adjustment to CPAP will be easier if you are able to connect with others who use the same treatment. Ask your sleep doctor if there is a support group in your area for people who have sleep apnea, or look for one on the Internet.  7. Comfort   Increase your level of comfort by using a saline spray, decongestant or heated humidifier if CPAP irritates your nose, mouth or throat. Use your unit's \"ramp\" setting to slowly get used to the air pressure level. There may be soft pads you can buy that will fit over your mask straps. Look on www.CPAP.com for accessories that can help make CPAP use more comfortable.  8. Cleaning   Clean your mask, tubing and headgear on a regular basis. Put this time in your schedule so that you don't forget to do it. Check and replace the filters for your CPAP unit and humidifier.    9. Completion   Although you are never finished with CPAP therapy, you should reward yourself by celebrating the completion of your first month of treatment. Expect this first month to be your hardest period of adjustment. It will involve some trial and error as you find the machine, mask and pressure settings that are right " for you.    10. Continuation  After your first month of treatment, continue to make a daily commitment to use your CPAP all night, every night and for every nap.    CPAP-Tips to starting with success:  Begin using your CPAP for short periods of time during the day while you watch TV or read.    Use CPAP every night and for every nap. Using it less often reduces the health benefits and makes it harder for your body to get used to it.    Make small adjustments to your mask, tubing, straps and headgear until you get the right fit. Tightening the mask may actually worsen the leak.  If it leaves significant marks on your face or irritates the bridge of your nose, it may not be the best mask for you.  Speak with the person who supplied the mask and consider trying other masks. Insurances will allow you to try different masks during the first month of starting CPAP.  Insurance also covers a new mask, hose and filter about every 6 months.    Use a saline nasal spray to ease mild nasal congestion. Neti-Pot or saline nasal rinses may also help. Nasal gel sprays can help reduce nasal dryness.  Biotene mouthwash can be helpful to protect your teeth if you experience frequent dry mouth.  Dry mouth may be a sign of air escaping out of your mouth or out of the mask in the case of a full face mask.  Speak with your provider if you expect that is the case.     Take a nasal decongestant to relieve more severe nasal or sinus congestion.  Do not use Afrin (oxymetazoline) nasal spray more than 3 days in a row.  Speak with your sleep doctor if your nasal congestion is chronic.    Use a heated humidifier that fits your CPAP model to enhance your breathing comfort. Adjust the heat setting up if you get a dry nose or throat, down if you get condensation in the hose or mask.  Position the CPAP lower than you so that any condensation in the hose drains back into the machine rather than towards the mask.    Try a system that uses nasal pillows  if traditional masks give you problems.    Clean your mask, tubing and headgear once a week. Make sure the equipment dries fully.    Regularly check and replace the filters for your CPAP unit and humidifier.    Work closely with your sleep provider and your CPAP supplier to make sure that you have the machine, mask and air pressure setting that works best for you. It is better to stop using it and call your provider to solve problems than to lay awake all night frustrated with the device.      BESIDES CPAP, WHAT OTHER THERAPIES ARE THERE?        Positioning Device  Positioning devices are generally used when sleep apnea is mild and only occurs on your back.This example shows a pillow that straps around the waist. It may be appropriate for those whose sleep study shows milder sleep apnea that occurs primarily when lying flat on one's back. Preliminary studies have shown benefit but effectiveness at home may need to be verified by a home sleep test. These devices are generally not covered by medical insurance.                        Oral Appliance  What is oral appliance therapy?  An oral appliance is a small acrylic device that fits over the upper and lower teeth or tongue (similar to an orthodontic retainer or a mouth guard). This device slightly advances the lower jaw or tongue, which moves the base of the tongue forward, opens the airway, improves breathing and can effectively treat snoring and obstructive sleep apnea sleep apnea. The appliance is fabricated and customized by a qualified dentist with experience in treating snoring and sleep apnea. Oral appliances are usually well tolerated and have relatively high compliance by patients1, 2, 3.  When is an oral appliance indicated?  Oral appliance therapy is recommended as a first-line treatment for patients with primary snoring, mild sleep apnea, and for patients with moderate sleep apnea who prefer appliance therapy to use of CPAP4, 5. Severity of sleep apnea is  determined by sleep testing and is based on the number of respiratory events per hour of sleep.   How successful is oral appliance therapy?  The success rate of oral appliance therapy in patients with mild sleep apnea is 75-80% while in patients with moderate sleep apnea it is 50-70%. The chance of success in patients with severe sleep apnea is 40-50%. The research also shows that oral appliances have a beneficial effect on the cardiovascular health of EVA patients at the same magnitude as CPAP therapy7.  Oral appliances should be a second-line treatment in cases of severe sleep apnea, but if not completely successful then a combination therapy utilizing CPAP plus oral appliance therapy may be effective. Oral appliances tend to be effective in a broad range of patients although studies show that the patients who have the highest success are females, younger patients, those with milder disease, and less severe obesity. 3, 6.   The chances of success are lower in patients who have more severe EVA, are older, and those who are morbidly obese.     Example of an oral appliance   Finding a dentist that practices dental sleep medicine  Specific training is available through the American Academy of Dental Sleep Medicine for dentists interested in working in the field of sleep. To find a dentist who is educated in the field of sleep and the use of oral appliances, near you, visit the Web site of the American Academy of Dental Sleep Medicine; also see   http://www.accpstorage.org/newOrganization/patients/oralAppliances.pdf  To search for a dentist certified in these practices:  Http://aadsm.org/FindADentist.aspx?1  1. Silva, et al. Objectively measured vs self-reported compliance during oral appliance therapy for sleep-disordered breathing. Chest 2013; 144(5): 3230-0560.  2. Kitty et al. Objective measurement of compliance during oral appliance therapy for sleep-disordered breathing. Thorax 2013; 68(1): 91-96.  3.  Trevor et al. Mandibular advancement devices in 620 men and women with EVA and snoring: tolerability and predictors of treatment success. Chest 2004; 125: 8299-7414.  4. Merlin et al. Oral appliances for snoring and EVA: a review. Sleep 2006; 29: 244-262.  5. Oralia et al. Oral appliance treatment for EVA: an update. J Clin Sleep Med 2014; 10(2): 215-227.  6. Sandy et al. Predictors of OSAH treatment outcome. J Dent Res 2007; 86: 9928-4634.      Weight Loss:    Weight management is a personal decision.  If you are interested in exploring weight loss strategies, the following discussion covers the impact on weight loss on sleep apnea and the approaches that may be successful.    Weight loss decreases severity of sleep apnea in most people with obesity. For those with mild obesity who have developed snoring with weight gain, even 15-30 pound weight loss can improve and occasionally eliminate sleep apnea.  Structured and life-long dietary and health habits are necessary to lose weight and keep healthier weight levels.     Though there may be significant health benefits from weight loss, long-term weight loss is very difficult to achieve- studies show success with dietary management in less than 10% of people. In addition, substantial weight loss may require years of dietary control and may be difficult if patients have severe obesity. In these cases, surgical management may be considered.  Finally, older individuals who have tolerated obesity without health complications may be less likely to benefit from weight loss strategies.    Your BMI is Body mass index is 48.5 kg/(m^2).  Body mass index (BMI) is one way to tell whether you are at a healthy weight, overweight, or obese. It measures your weight in relation to your height.  A BMI of 18.5 to 24.9 is in the healthy range. A person with a BMI of 25 to 29.9 is considered overweight, and someone with a BMI of 30 or greater is considered obese. More than  two-thirds of American adults are considered overweight or obese.  Being overweight or obese increases the risk for further weight gain. Excess weight may lead to heart disease and diabetes.  Creating and following plans for healthy eating and physical activity may help you improve your health.  Weight control is part of healthy lifestyle and includes exercise, emotional health, and healthy eating habits. Careful eating habits lifelong are the mainstay of weight control. Though there are significant health benefits from weight loss, long-term weight loss with diet alone may be very difficult to achieve- studies show long-term success with dietary management in less than 10% of people. Attaining a healthy weight may be especially difficult to achieve in those with severe obesity. In some cases, medications, devices and surgical management might be considered.  What can you do?  If you are overweight or obese and are interested in methods for weight loss, you should discuss this with your provider.     Consider reducing daily calorie intake by 500 calories.     Keep a food journal.     Avoiding skipping meals, consider cutting portions instead.    Diet combined with exercise helps maintain muscle while optimizing fat loss. Strength training is particularly important for building and maintaining muscle mass. Exercise helps reduce stress, increase energy, and improves fitness. Increasing exercise without diet control, however, may not burn enough calories to loose weight.       Start walking three days a week 10-20 minutes at a time    Work towards walking thirty minutes five days a week     Eventually, increase the speed of your walking for 1-2 minutes at time    In addition, we recommend that you review healthy lifestyles and methods for weight loss available through the National Institutes of Health patient information sites:  http://win.niddk.nih.gov/publications/index.htm    And look into health and wellness  programs that may be available through your health insurance provider, employer, local [facility] center, or [email or web address].    Surgery:    Upper Airway Surgery for EVA  Surgery for EVA is a second-line treatment option in the management of sleep apnea.  Surgery should be considered for patients who are having a difficult time tolerating CPAP.    Surgery for EVA is directed at areas that are responsible for narrowing or complete obstruction of the airway during sleep.  There are a wide range of procedures available to enlarge and/or stabilize the airway to prevent blockage of breathing in the three major areas where it can occur: the palate, tongue, and nasal regions.  Successful surgical treatment depends on the accurate identification of the factors responsible for obstructive sleep apnea in each person.  A personalized approach is required because there is no single treatment that works well for everyone.  Because of anatomic variation, consultation with an examination by a sleep surgeon is a critical first step in determining what surgical options are best for each patient.  In some cases, examination during sedation may be recommended in order to guide the selection of procedures.  Patients will be counseled about risks and benefits as well as the typical recovery course after surgery. Surgery is typically not a cure for a person s EVA.  However, surgery will often significantly improve one s EVA severity (termed  success rate ).  Even in the absence of a cure, surgery will decrease the cardiovascular risk associated with OSA7; improve overall quality of life8 (sleepiness, functionality, sleep quality, etc).          Palate Procedures:  Patients with EVA often have narrowing of their airway in the region of their tonsils and uvula.  The goals of palate procedures are to widen the airway in this region as well as to help the tissues resist collapse.  Modern palate procedure techniques focus on tissue conservation and  soft tissue rearrangement, rather than tissue removal.  Often the uvula is preserved in this procedure. Residual sleep apnea is common in patient after pharyngoplasty with an average reduction in sleep apnea events of 33%2.      Tongue Procedures:  While patients are awake, the muscles that surround the throat are active and keep this region open for breathing. These muscles relax during sleep, allowing the tongue and other structures to collapse and block breathing.  There are several different tongue procedures available.  Selection of a tongue base procedure depends on characteristics seen on physical exam.  Generally, procedures are aimed at removing bulky tissues in this area or preventing the back of the tongue from falling back during sleep.  Success rates for tongue surgery range from 50-62%3.    Hypoglossal Nerve Stimulation:  Hypoglossal nerve stimulation has recently received approval from the [place] Food and Drug Administration for the treatment of obstructive sleep apnea.  This is based on research showing that the system was safe and effective in treating sleep apnea6.  Results showed that the median AHI score decreased 68%, from 29.3 to 9.0. This therapy uses an implant system that senses breathing patterns and delivers mild stimulation to airway muscles, which keeps the airway open during sleep.  The system consists of three fully implanted components: a small generator (similar in size to a pacemaker), a breathing sensor, and a stimulation lead.  Using a small handheld remote, a patient turns the therapy on before bed and off upon awakening.    Candidates for this device must be greater than 22 years of age, have moderate to severe EVA (AHI between 20-65), BMI less than 32, have tried CPAP/oral appliance without success, and have appropriate upper airway anatomy (determined by a sleep endoscopy performed by Dr. Holman).    Hypoglossal Nerve Stimulation Pathway:    The sleep surgeon s office  will work with the patient through the insurance prior-authorization process (including communications and appeals).    Nasal Procedures:  Nasal obstruction can interfere with nasal breathing during the day and night.  Studies have shown that relief of nasal obstruction can improve the ability of some patients to tolerate positive airway pressure therapy for obstructive sleep apnea1.  Treatment options include medications such as nasal saline, topical corticosteroid and antihistamine sprays, and oral medications such as antihistamines or decongestants. Non-surgical treatments can include external nasal dilators for selected patients. If these are not successful by themselves, surgery can improve the nasal airway either alone or in combination with these other options.    Combination Procedures:  Combination of surgical procedures and other treatments may be recommended, particularly if patients have more than one area of narrowing or persistent positional disease.  The success rate of combination surgery ranges from 66-80%2,3.      1. Neha PATTERSON. The Role of the Nose in Snoring and Obstructive Sleep Apnoea: An Update.  Eur Arch Otorhinolaryngol. 2011; 268: 1365-73.  2.  Neal SM; Gutierrez JA; Ce JR; Pallanch JF; Sapna MB; Jordan SG; Tsering TALAMANTES. Surgical modifications of the upper airway for obstructive sleep apnea in adults: a systematic review and meta-analysis. SLEEP 2010;33(10):2575-4264. Mendel NATION. Hypopharyngeal surgery in obstructive sleep apnea: an evidence-based medicine review.  Arch Otolaryngol Head Neck Surg. 2006 Feb;132(2):206-13.  3. Dragan YH1, Antwon Y, Mulugeta CHRISTIAN. The efficacy of anatomically based multilevel surgery for obstructive sleep apnea. Otolaryngol Head Neck Surg. 2003 Oct;129(4):327-35.  4. Mendel NATION, Goldberg A. Hypopharyngeal Surgery in Obstructive Sleep Apnea: An Evidence-Based Medicine Review. Arch Otolaryngol Head Neck Surg. 2006 Feb;132(2):206-13.  5. Meka TRACY et al. Upper-Airway  Stimulation for Obstructive Sleep Apnea.  N Engl J Med. 2014 Jan 9;370(2):139-49.  6. Perosalee Y et al. Increased Incidence of Cardiovascular Disease in Middle-aged Men with Obstructive Sleep Apnea. Am J Respir Crit Care Med; 2002 166: 159-165  7. Russell HARRIS et al. Studying Life Effects and Effectiveness of Palatopharyngoplasty (SLEEP) study: Subjective Outcomes of Isolated Uvulopalatopharyngoplasty. Otolaryngol Head Neck Surg. 2011; 144: 623-631.  Please, schedule sleep study and follow up visit with the nurse (she will coming into the room and meet with you). Use sleeping aid only if needed during the night of the study.    Thank you!    Bony Mckeon MD, MPH  Clinical Sleep and Occupational / Environmental Medicine              Follow-ups after your visit        Your next 10 appointments already scheduled     Nov 29, 2018  8:30 PM CST   Psg Split W/Tcm with BED 5 SH SLEEP   [facility] ([facility])    [address] [place] [id number] [phone]            Dec 11, 2018  8:30 AM CST   Return Sleep Patient with Rahul Bullock MD   [facility]    [address]  [place] [id number] [phone]            Dec 13, 2018  8:45 AM CST   LAB with RU LAB   [facility] ([facility])    [address]  [place] [id number] [phone]           Please do not eat 10-12 hours before your appointment if you are coming in fasting for labs on lipids, cholesterol, or glucose (sugar). This does not apply to pregnant women. Water, hot tea and black coffee (with nothing added) are okay. Do not drink other fluids, diet soda or chew gum.            Dec 13, 2018  9:45 AM CST   Pulmonary Hypertension Return with Dawn Longoria MD   [facility] ([facility])    [id number]  [address]  [facility] [id number] [phone]              Future tests that were ordered for you today     Open Future Orders        Priority Expected Expires Ordered    Comprehensive Sleep Study Routine  4/10/2019 10/12/2018    ABG-Blood Gas Arterial ([place] / [place] / [place]) Routine  12/11/2018 10/12/2018            Who to contact     If you have questions or need follow up information about today's clinic visit or your schedule please contact [facility] directly at [phone].  Normal or non-critical lab and imaging results will be communicated to you by Simply Measuredhart, letter or phone within 4 business days after the clinic has received the results. If you do not hear from us within 7 days, please contact the clinic through [employer] or phone. If you have a critical or abnormal lab result, we will notify you by phone as soon as possible.  Submit refill requests through [employer] or call your pharmacy and they will forward the refill request to us. Please allow 3 business days for your refill to be completed.          Additional Information About Your Visit        Simply Measuredhart Information     [employer] gives you secure access to your electronic health record. If you see a primary care provider, you can also send messages to your care team and make appointments. If you have questions, please call your primary care clinic.  If you do not have a primary care provider, please call [phone] and they will assist you.        Care EveryWhere ID     This is your Care EveryWhere ID. This could be used by other organizations to access your [place] medical records  [id number]        Your Vitals Were     Pulse Temperature Respirations Height Pulse Oximetry BMI (Body Mass Index)    71 98.2  F (36.8  C) (Oral) 16 1.778 m (5' 10\") 98% 48.5 kg/m2       Blood Pressure from Last 3 Encounters:   10/12/18 131/77   10/08/18 123/71   09/27/18 (!) 142/92    Weight from Last 3 Encounters: "   10/12/18 (!) 153.3 kg (338 lb)   09/27/18 (!) 156.9 kg (346 lb)              We Performed the Following     SLEEP EVALUATION & MANAGEMENT REFERRAL - ADULT -[facility]  [phone] (Age 18 and up)          Today's Medication Changes          These changes are accurate as of 10/12/18  3:22 PM.  If you have any questions, ask your nurse or doctor.               Start taking these medicines.        Dose/Directions    zolpidem 5 MG tablet   Commonly known as:  AMBIEN   Used for:  Palpitations, Benign essential hypertension, Hypothyroidism, unspecified type, Morbid obesity (H), Excessive daytime sleepiness, Snoring   Started by:  Bony Mckeon MD        Take tablet by mouth 15 minutes prior to sleep, for Sleep Study   Quantity:  1 tablet   Refills:  0            Where to get your medicines      Some of these will need a paper prescription and others can be bought over the counter.  Ask your nurse if you have questions.     Bring a paper prescription for each of these medications     zolpidem 5 MG tablet                Primary Care Provider Office Phone # Fax #    Do Stevenson [phone]       [place] [facility] [address] [place] [address]        Equal Access to Services     DARRION GALAVIZ AH: Hadii shital cox hadasho Sosierraali, waaxda luqadaha, qaybta kaalmada adeegyada, lexie coughlin. So [facility] [phone].    ATENCIÓN: Si habla español, tiene a brooks disposición servicios gratuitos de asistencia lingüística. Shazia al [phone].    We comply with applicable federal civil rights laws and [place] laws. We do not discriminate on the basis of race, color, national origin, age, disability, sex, sexual orientation, or gender identity.            Thank you!     Thank you for choosing [facility]  for your care. Our goal is always to provide you with excellent care. Hearing back from our patients is  one way we can continue to improve our services. Please take a few minutes to complete the written survey that you may receive in the mail after your visit with us. Thank you!             Your Updated Medication List - Protect others around you: Learn how to safely use, store and throw away your medicines at www.disposemymeds.org.          This list is accurate as of 10/12/18  3:22 PM.  Always use your most recent med list.                   Brand Name Dispense Instructions for use Diagnosis    ALEVE PM PO           IBUPROFEN PO      Take by mouth every 4 hours as needed for moderate pain        LEVOTHROID PO      Take 25 mcg by mouth daily        lisinopril 40 MG tablet    PRINIVIL/ZESTRIL    30 tablet    Take 1 tablet (40 mg) by mouth daily    Benign essential hypertension       METFORMIN HCL PO      Take 500 mg by mouth 3 times daily (with meals)        phentermine 37.5 MG capsule      Take 37.5 mg by mouth every morning        VITAMIN D (CHOLECALCIFEROL) PO      Take 50,000 Units by mouth once a week        zolpidem 5 MG tablet    AMBIEN    1 tablet    Take tablet by mouth 15 minutes prior to sleep, for Sleep Study    Palpitations, Benign essential hypertension, Hypothyroidism, unspecified type, Morbid obesity (H), Excessive daytime sleepiness, Snoring

## 2018-10-23 ENCOUNTER — DOCUMENTATION ONLY (OUTPATIENT)
Dept: CARDIOLOGY | Facility: CLINIC | Age: 53
End: 2018-10-23

## 2018-10-23 DIAGNOSIS — I47.10 PAROXYSMAL SUPRAVENTRICULAR TACHYCARDIA (H): Primary | ICD-10-CM

## 2018-10-23 NOTE — PROGRESS NOTES
Message from Dr. Longoria:  Please let the patient know that she had some extra beats and a brief run of fast heart beat (PSVT) on her heart monitor. I recommend:   - A trial of Metoprolol XL 25 mg daily. If additional questions or side effects - follow up with ANIKA.   - Sleep study as scheduled.   - Follow up with me, as scheduled.     Thank you.   Dr. PRIYANKA Longoria MD [facility]   Cardiology     Attempted to contact patient to review Dr. Longoria's recommendations, left message for patient to call back.  Patient is scheduled to have sleep study 11/29/18.    Spoke with patient, she will try taking the toprol XL 25mg daily. She will call back with any concerns such as fatigue or lightheadedness.

## 2018-10-24 RX ORDER — METOPROLOL SUCCINATE 25 MG/1
25 TABLET, EXTENDED RELEASE ORAL DAILY
Qty: 30 TABLET | Refills: 3 | Status: SHIPPED | OUTPATIENT
Start: 2018-10-24 | End: 2018-12-13

## 2018-11-29 ENCOUNTER — THERAPY VISIT (OUTPATIENT)
Dept: SLEEP MEDICINE | Facility: CLINIC | Age: 53
End: 2018-11-29
Payer: COMMERCIAL

## 2018-11-29 DIAGNOSIS — I10 BENIGN ESSENTIAL HYPERTENSION: ICD-10-CM

## 2018-11-29 DIAGNOSIS — G47.19 EXCESSIVE DAYTIME SLEEPINESS: ICD-10-CM

## 2018-11-29 DIAGNOSIS — E03.9 HYPOTHYROIDISM, UNSPECIFIED TYPE: ICD-10-CM

## 2018-11-29 DIAGNOSIS — R00.2 PALPITATIONS: ICD-10-CM

## 2018-11-29 DIAGNOSIS — E66.01 MORBID OBESITY (H): ICD-10-CM

## 2018-11-29 DIAGNOSIS — R06.83 SNORING: ICD-10-CM

## 2018-11-29 PROCEDURE — 95810 POLYSOM 6/> YRS 4/> PARAM: CPT | Performed by: INTERNAL MEDICINE

## 2018-11-29 NOTE — MR AVS SNAPSHOT
After Visit Summary   11/29/2018    Michaela Garcia    MRN: [id number]           Patient Information     Date Of Birth          1965        Visit Information        Provider Department      11/29/2018 8:30 PM BED 5 SH SLEEP [facility]        Today's Diagnoses     Palpitations        Benign essential hypertension        Hypothyroidism, unspecified type        Morbid obesity (H)        Excessive daytime sleepiness        Snoring           Follow-ups after your visit        Your next 10 appointments already scheduled     Dec 11, 2018  8:30 AM CST   Return Sleep Patient with Rahul Bullock MD   [facility] ([facility])    [address] [place] [id number] [phone]            Dec 13, 2018  8:45 AM CST   LAB with RU LAB   [facility] ([facility])    [address] [place] [id number] [phone]           Please do not eat 10-12 hours before your appointment if you are coming in fasting for labs on lipids, cholesterol, or glucose (sugar). This does not apply to pregnant women. Water, hot tea and black coffee (with nothing added) are okay. Do not drink other fluids, diet soda or chew gum.            Dec 13, 2018  9:45 AM CST   Pulmonary Hypertension Return with Dawn Longoria MD   [facility] ([facility])    [address] [place] [id number] [phone]              Who to contact     If you have questions or need follow up information about today's clinic visit or your schedule please contact [facility] directly at [phone].  Normal or non-critical lab and imaging results will be communicated to you by MyChart, letter or phone within 4 business days after the clinic has received the results. If you do not hear from us within 7 days, please contact  the clinic through [employer] or phone. If you have a critical or abnormal lab result, we will notify you by phone as soon as possible.  Submit refill requests through [employer] or call your pharmacy and they will forward the refill request to us. Please allow 3 business days for your refill to be completed.          Additional Information About Your Visit        [employer] Information     [employer] gives you secure access to your electronic health record. If you see a primary care provider, you can also send messages to your care team and make appointments. If you have questions, please call your primary care clinic.  If you do not have a primary care provider, please call [phone] and they will assist you.        Care EveryWhere ID     This is your Care EveryWhere ID. This could be used by other organizations to access your [place] medical records  [id number]         Blood Pressure from Last 3 Encounters:   10/12/18 131/77   10/08/18 123/71   09/27/18 (!) 142/92    Weight from Last 3 Encounters:   10/12/18 (!) 153.3 kg (338 lb)   09/27/18 (!) 156.9 kg (346 lb)              We Performed the Following     Comprehensive Sleep Study        Primary Care Provider Office Phone # Fax #    Do Stevenson [phone]       [facility] [facility] [address] [place] [address]        Equal Access to Services     DARRION GALAVIZ : Hadii aad ku hadasho Soomaali, waaxda luqadaha, qaybta kaalmada adeegyada, lexie coughlin. So [facility] [phone].    ATENCIÓN: Si habla español, tiene a brooks disposición servicios gratuitos de asistencia lingüística. Llame al [phone].    We comply with applicable federal civil rights laws and [place] laws. We do not discriminate on the basis of race, color, national origin, age, disability, sex, sexual orientation, or gender identity.            Thank you!     Thank you for choosing [facility]  for your care. Our  goal is always to provide you with excellent care. Hearing back from our patients is one way we can continue to improve our services. Please take a few minutes to complete the written survey that you may receive in the mail after your visit with us. Thank you!             Your Updated Medication List - Protect others around you: Learn how to safely use, store and throw away your medicines at www.disposemymeds.org.          This list is accurate as of 11/29/18 11:59 PM.  Always use your most recent med list.                   Brand Name Dispense Instructions for use Diagnosis    ALEVE PM PO           IBUPROFEN PO      Take by mouth every 4 hours as needed for moderate pain        LEVOTHROID PO      Take 25 mcg by mouth daily        lisinopril 40 MG tablet    PRINIVIL/ZESTRIL    30 tablet    Take 1 tablet (40 mg) by mouth daily    Benign essential hypertension       METFORMIN HCL PO      Take 500 mg by mouth 3 times daily (with meals)        metoprolol succinate ER 25 MG 24 hr tablet    TOPROL-XL    30 tablet    Take 1 tablet (25 mg) by mouth daily    Paroxysmal supraventricular tachycardia (H)       phentermine 37.5 MG capsule    ADIPEX-P     Take 37.5 mg by mouth every morning        VITAMIN D (CHOLECALCIFEROL) PO      Take 50,000 Units by mouth once a week        zolpidem 5 MG tablet    AMBIEN    1 tablet    Take tablet by mouth 15 minutes prior to sleep, for Sleep Study    Palpitations, Benign essential hypertension, Hypothyroidism, unspecified type, Morbid obesity (H), Excessive daytime sleepiness, Snoring

## 2018-12-03 LAB — SLPCOMP: NORMAL

## 2018-12-03 NOTE — PROCEDURES
" SLEEP STUDY INTERPRETATION  DIAGNOSTIC POLYSOMNOGRAPHY REPORT      Patient: DENIS CHAPMAN  YOB: 1965  Study Date: 11/29/2018  MRN: [id number]  Referring Provider: Graham Huertas MD  Ordering Provider: Bony Mckeon MD   Interpreting Physician: Rahul Bullock MD    Indications for Polysomnography: The patient is a 53 y old Female who is 5' 10\" and weighs 338.0 lbs. Her BMI is 48.9, [place] sleepiness scale 10.0 and neck circumference is 43.0 cm. Relevant medical history includes hypertension, morbid obesity, hypothyroidism, asthma, snoring and excessive daytime sleepiness.. A diagnostic polysomnogram was performed to evaluate for sleep apnea, periodic leg movements, CSA, hypoventilation and hypoxemia.    Polysomnogram Data: A full night polysomnogram recorded the standard physiologic parameters including EEG, EOG, EMG, ECG, nasal and oral airflow. Respiratory parameters of chest and abdominal movements were recorded with respiratory inductance plethysmography. Oxygen saturation was recorded by pulse oximetry. Hypopnea scoring rule used: 1B 4%.    Sleep Architecture: All stages of sleep were achieved. There were increased sleep fragmentation and poor sleep efficiency.  The total recording time of the polysomnogram was 510.8 minutes. The total sleep time was 434.0 minutes. Sleep latency was normal at 10.1 minutes with the use of a sleep aid (Ambien 5 mg). REM latency was 62.5 minutes. Arousal index was increased at 46.5 arousals per hour. Sleep efficiency was decreased at 85.0%. Wake after sleep onset was 52.5 minutes. The patient spent 16.1% of total sleep time in Stage N1, 46.7% in Stage N2, 18.2% in Stage N3, and 19.0% in REM. Time in REM supine was 14.5 minutes.    Respiration: There was mild sleep disordered breathing predominantly occurred during REM sleep, characterized predominantly by obstructive respiratory apneas and hypopneas with sleep related hypoxemia.    Events ? The " polysomnogram revealed a presence of 1 obstructive, 0 central, and 0 mixed apneas resulting in an apnea index of 0.1 events per hour. There were 70 obstructive hypopneas and 0 central hypopneas resulting in an obstructive hypopnea index of 9.7 and central hypopnea index of 0 events per hour. The combined apnea/hypopnea index was 9.8 events per hour (central apnea/hypopnea index was 0 events per hour). The REM AHI was 41.5 events per hour. The supine AHI was 8.6 events per hour. The RERA index was 2.2 events per hour.  The RDI was 12.0 events per hour.    Snoring - was reported as mild to moderate and intermittent.    Respiratory rate and pattern - was notable for normal respiratory rate and pattern.    Sustained Sleep Associated Hypoventilation - Transcutaneous carbon dioxide monitoring was used, however significant hypoventilation was not present with a maximum change from 20.0 to 55.1 mmHg and 0.2 minutes at or greater than 55 mmHg.    Sleep Associated Hypoxemia - (Greater than 5 minutes O2 sat at or below 88%) was present. Baseline oxygen saturation was 93.4%. Lowest oxygen saturation was 80.4%. Time spent less than or equal to 88% was 8.0 minutes. Time spent less than or equal to 89% was 14.5 minutes.    Movement Activity: Periodic Limb Activity - There were neither periodic leg movements nor abnormal nocturnal sleep behavior.  There were 0 PLMs during the entire study. The PLM index was 0 movements per hour.     REM EMG Activity - Excessive transient/sustained muscle activity was not present.    Nocturnal Behavior - Abnormal sleep related behaviors were not noted during NREM / REM sleep.     Bruxism - None apparent.    Cardiac Summary: There was normal sinus rhythm throughout the night with frequent premature ventricular contractures and occasional premature atrial contractures.  The average pulse rate was 65.5 bpm. The minimum pulse rate was 46.5 bpm while the maximum pulse rate was 92.3 bpm.  Arrhythmias were  noted.      Assessment:     Obstructive Sleep Apnea G47.33    Sleep Hypoxemia G47.36     Frequent Premature Ventricular Contractures    Recommendations:    Based on the presence of mild obstructive sleep apnea and excessive daytime sleepiness, treatment could be empirically initiated with Auto?titrating PAP therapy with a range of 5 to 15 cmH2O. Recommend clinical follow up with sleep management team.    Hypoxemia may very well resolve once respiratory events (obstruction/apneas) caused by sleep disordered breathing is addressed, but if it persists on an overnight nocturnal oximetry, we could consider supplemental O2 therapy.    Patient may be a candidate for dental appliance through referral to Sleep Dentistry for the treatment of obstructive sleep apnea and/or socially disruptive snoring.    If devices are not acceptable or effective, patient may benefit from evaluation of possible surgical options. If she is interested, would recommend referral to specialized ENT-Sleep provider.    Advice regarding the risks of drowsy driving.    Suggest optimizing sleep schedule and avoiding sleep deprivation.    Weight management (if BMI > 30).    Cardiac arrhythmia in part may secondary to untreated sleep disordered breathing, we recommend follow primary care doctor for further evaluation.    Avoid sedating medications, including narcotics, and alcohol, as these may exacerbate sleep apnea and/or underlying respiratory disorders.     Follow up primary care doctor and/or referring physician as scheduled.         _____________________________________   electronically signed by: BROOKLYN HEREDIA MD (12/3/18)    cc: Graham Huertas MD

## 2018-12-11 ENCOUNTER — OFFICE VISIT (OUTPATIENT)
Dept: SLEEP MEDICINE | Facility: CLINIC | Age: 53
End: 2018-12-11
Payer: COMMERCIAL

## 2018-12-11 VITALS
BODY MASS INDEX: 41.95 KG/M2 | SYSTOLIC BLOOD PRESSURE: 113 MMHG | HEART RATE: 55 BPM | WEIGHT: 293 LBS | HEIGHT: 70 IN | DIASTOLIC BLOOD PRESSURE: 69 MMHG | OXYGEN SATURATION: 96 %

## 2018-12-11 DIAGNOSIS — G47.33 OSA (OBSTRUCTIVE SLEEP APNEA): Primary | ICD-10-CM

## 2018-12-11 DIAGNOSIS — E66.01 MORBID OBESITY WITH BMI OF 45.0-49.9, ADULT (H): ICD-10-CM

## 2018-12-11 DIAGNOSIS — G47.34 SLEEP RELATED HYPOXIA: ICD-10-CM

## 2018-12-11 PROCEDURE — 99213 OFFICE O/P EST LOW 20 MIN: CPT | Performed by: INTERNAL MEDICINE

## 2018-12-11 ASSESSMENT — MIFFLIN-ST. JEOR: SCORE: 2182.12

## 2018-12-11 NOTE — PROGRESS NOTES
Sleep Study Follow-Up Visit:    Date on this visit: 2018    ASSESSMENT / PLAN:       EVA (obstructive sleep apnea)  Sleep related hypoxia  Morbid obesity with BMI of 45.0-49.9, adult (H)     Discussed with patient the recent sleep study which showed mild EVA REM predominant and treatment options, we recommend Auto-CPAP 5-15 cmH2O in addition to weight loss and avoiding sleeping supine position. Patient is recommended to improve his sleep-wake schedule and good sleep hygiene. Patient was advised to use PAP therapy for at least 7-8 hours and during naps (naps are not recommended).  Instructions given. Follow up 4-6 weeks after PAP use. She is mouth breather, order full face mask.  Hypoxemia may very well resolve once respiratory events (obstruction/apneas) caused by sleep disordered breathing is addressed, but if it persists on overnight oximetry could consider supplemental O2 therapy.  Counseled regarding weight loss through diet modification and increased physical activity. Patient was given instuctions of weight loss and advised to follow up his/her PCP for further weight loss interventions.        Frequent PVC's may related to untreated EVA, she had Zio-patch and follows up cardiologist.      All questions were answered.  The patient indicates understanding of the above issues and agrees with the plan set forth.    Orders Placed This Encounter   Procedures     Comprehensive DME       She will follow up with me in about 2 month(s).       BRIEF SUMMARY:    Michaela Garcia is a 53 year old female with history of hypertension, Hashimoto's thyroiditis (hypothryoidism), asthma, snoring and excessive daytime sleepiness comes in today for follow-up of her sleep study done on 18 at the [facility] for result of sleep study. Please see H&P for his presenting sleep symptoms for additional details.      PS18  Sleep latency 10.1 minutes with sleep aid.    REM achieved.   REM latency 62.5  "minutes.    Sleep efficiency 85%. Total sleep time 434 minutes.  Sleep architecture:  Stage 1, 16.1% (5%), stage 2, 46.7% (45-55%), stage 3, 18.2% (15-20%), stage REM, 19% (20-25%).    AHI was 9.8/hour.   CSAI was 0/hour.   RDI 12/hour.    REM AHI 41.2/hour.    Supine AHI 8.6/hour.    Lowest O2 saturation:80.4%  S/He spent 8 minutes below 88% SpO2.   Periodic Limb Movement Index 0/hour.     Frequent PVC's.    These findings were reviewed with the patient and copy of the sleep study result was given.    Past medical/surgical history, family history, social history, medications and allergies were reviewed.      Problem List:  Patient Active Problem List    Diagnosis Date Noted     Morbid obesity (H) 10/12/2018     Priority: Medium             Medications:     Current Outpatient Medications   Medication Sig     IBUPROFEN PO Take by mouth every 4 hours as needed for moderate pain     Levothyroxine Sodium (LEVOTHROID PO) Take 25 mcg by mouth daily     lisinopril (PRINIVIL/ZESTRIL) 40 MG tablet Take 1 tablet (40 mg) by mouth daily     METFORMIN HCL PO Take 500 mg by mouth 3 times daily (with meals)     metoprolol succinate (TOPROL-XL) 25 MG 24 hr tablet Take 1 tablet (25 mg) by mouth daily     Naproxen Sod-Diphenhydramine (ALEVE PM PO)      phentermine 37.5 MG capsule Take 37.5 mg by mouth every morning     VITAMIN D, CHOLECALCIFEROL, PO Take 50,000 Units by mouth once a week     zolpidem (AMBIEN) 5 MG tablet Take tablet by mouth 15 minutes prior to sleep, for Sleep Study     No current facility-administered medications for this visit.           PHYSICAL EXAMINATION:  /69   Pulse 55   Ht 1.778 m (5' 10\")   Wt 149.7 kg (330 lb)   SpO2 96%   BMI 47.35 kg/m            Data: All pertinent previous laboratory data reviewed     No results found for: PH, PHARTERIAL, PO2, [id number], SAT, PCO2, HCO3, BASEEXCESS, JAMIE, BEB  No results found for: TSH  Lab Results   Component Value Date     (A) 07/04/2018     No " results found for: HGB  Lab Results   Component Value Date    BUN 14 07/04/2018    CR 0.78 07/04/2018     No results found for: ARANZA     Fifteen minutes spent with patient, all of which were spent face-to-face counseling, consulting, coordinating plan of care, going over sleep test results and chart review.          Rahul Bullock MD 12/11/2018   [facility]  [address], [place], [place] [address] [phone] Clinic      CC: No ref. provider found  Copy to: Do Stevenson

## 2018-12-11 NOTE — NURSING NOTE
"Chief Complaint   Patient presents with     RECHECK     PSG f/u       Initial /69   Pulse 55   Ht 1.778 m (5' 10\")   Wt 149.7 kg (330 lb)   SpO2 96%   BMI 47.35 kg/m   Estimated body mass index is 47.35 kg/m  as calculated from the following:    Height as of this encounter: 1.778 m (5' 10\").    Weight as of this encounter: 149.7 kg (330 lb).    Medication Reconciliation: complete        Christin Redd, Sleep Clinic Specialist  "

## 2018-12-11 NOTE — PATIENT INSTRUCTIONS
MY TREATMENT INFORMATION FOR SLEEP APNEA-  Michaela Garcia    MY CONTACT NUMBERS ARE:  [phone]  DOCTOR : Rahul Bullock MD  SLEEP CENTER :  [facility]  CPAP EQUIPMENT     You have mild sleep apnea with low oxygen, auto-CPAP is prescribed for you.   AHI 0-5/hr normal  AHI 5-15 events of hour is a mild sleep apnea  AHI 15-30/hr is moderate sleep apnea  AHI over 30/hr is severe sleep apnea)    CPAP therapy includes adaptation to a mask interface and a delivery of air pressure.    You will be provided with an auto-titrating CPAP with a pressure range of 5-15 cmH2O with heated humidity to limit nasal congestion. Adjust the heat level on humidifier to find a setting that prevents dry nose but does not cause condensation in the hose or mask. Use distilled water in the humidifier.    The CPAP has a ramp function that starts the pressure lower than your prescribed pressure and gradually increases it over a number of minutes.  This may make it easier to fall asleep.                  Try to use the CPAP every-night, all night, at least 7-8 hours each night.  Daytime naps are not advised, but use CPAP if taking naps. Many insurances require that we prove you are using the CPAP at least 4 hours on at least 70% of nights over a 30 day period. We have 90 days to meet those criteria.    Objective measure goal  Compliance  Goal >70% (preferrably 100%)  Leak   Goal < 10% (less than 24 L/min)  AHI  Goal < 5 events per hour   Usage  Goal 7-8 hours.         Patient was advised not to drive if drowsy or sleepy.                Discussed weight management and the impact of weight gain on sleep apnea.  Let me know if you snore or feel the pressure is too high.    - Try mask desensitization as below and do not remove mask headgear when you go to bathroom at night, but just unplug the hose.    You can get new supplies (mask, hose and filter) for your CPAP every 3-6 months, covered by insurance. You do not need to get supplies that  "often, but they are available if you would like them.  You may exchange the mask once within the first month if you feel the initial mask does not fit well.  Contact your medical equipment provider for equipment issues.  Please let me know if you have any return of snoring, daytime sleepiness or poor sleep quality. We will want to make sure your CPAP is adequately treating your apnea.  There is a website called CPAP.com that has accessories that may make CPAP use easier. Please visit it at your convenience.    Our phone number is [phone]    Follow-up 4-6 weeks after PAP usage.  Bring your CPAP machine with you to the follow up appointment.    Frequently asked questions:  1. What is Obstructive Sleep Apnea (EVA)? EVA is the most common type of sleep apnea. Apnea literally means, \"without breath.\" It is characterized by repetitive pauses in breathing, despite continued effort to breathe, and is usually associated with a reduction in blood oxygen saturation. Apneas can last 10 to over 60 seconds. It is caused by narrowing or collapse of the upper airway as muscles relax during sleep. Severity of sleep apnea is determined by frequency of breathing events and their effect on your sleep and oxygen levels determined during sleep testing.   2. What are the consequences of EVA? Symptoms include: daytime sleepiness- possibly increasing the risk of falling asleep while driving, unrefreshing/restless sleep, snoring, insomnia, waking frequently to urinate, waking with heartburn or reflux, reduced concentration and memory, and morning headaches. Other health consequences may include development of high blood pressure and other cardiovascular disease in persons who are susceptible. Untreated EVA  can contribute to heart disease, stroke and diabetes.   3. What are the treatment options? In most situations, sleep apnea is a lifelong disease that must be managed with daily therapy. Medications are not effective for sleep apnea " and surgery is generally not performed until other therapies have been tried. Therapy is usually tailored to the individual patient based on many factors including your wishes as well as severity of sleep apnea and severity of obesity. Continuous Positive Airway (CPAP) is the most reliable treatment. An oral device to hold your jaw forward is usually      IF I HAVE SLEEP APNEA.....  WHERE CAN I FIND MORE INFORMATION?    American Academy of Sleep Medicine Patient information on sleep disorders:  http://yoursleep.aasmnet.org    THINGS I SHOULD REMEMBER  In most situations, sleep apnea is a lifelong disease that must be managed with daily therapy. Untreated disease, when severe, may result in an increased risk for an array of problems from heart disease to mood changes, car accidents and shorter lifespan.    CPAP-  WHY AND HOW?                                    Continuous positive airway pressure, or CPAP, is the most effective treatment for obstructive sleep apnea. A decision to use CPAP is a major step forward in the pursuit of a healthier life. The successful use of CPAP will help you breathe easier, sleep better and live healthier. Using CPAP can be a positive experience if you keep these kim points in mind:  1. Commitment  CPAP is not a quick fix for your problem. It involves a long-term commitment to improve your sleep and your health.    2. Communication  Stay in close communication with both your sleep doctor and your CPAP supplier. Ask lots of questions and seek help when you need it.    3. Consistency  Use CPAP all night, every night and for every nap. You will receive the maximum health benefits from CPAP when you use it every time that you sleep. This will also make it easier for your body to adjust to the treatment.    4. Correction  The first machine and mask that you try may not be the best ones for you. Work with your sleep doctor and your CPAP supplier to make corrections to your equipment selection.  "Ask about trying a different type of machine or mask if you have ongoing problems. Make sure that your mask is a good fit and learn to use your equipment properly.    5. Challenge  Tell a family member or close friend to ask you each morning if you used your CPAP the previous night. Have someone to challenge you to give it your best effort.    6. Connection   Your adjustment to CPAP will be easier if you are able to connect with others who use the same treatment. Ask your sleep doctor if there is a support group in your area for people who have sleep apnea, or look for one on the Internet.    7. Comfort   Increase your level of comfort by using a saline spray, decongestant or heated humidifier if CPAP irritates your nose, mouth or throat. Use your unit's \"ramp\" setting to slowly get used to the air pressure level. There may be soft pads you can buy that will fit over your mask straps. Look on www.CPAP.com for accessories such as these straps, a pillow contoured for side-sleeping with CPAP, longer hoses, hose covers to reduce condensation, or stands to keep the hose out of your way.                                                              8. Cleaning   Clean your mask, tubing and headgear on a regular basis. Put this time in your schedule so that you don't forget to do it. Check and replace the filters for your CPAP unit and humidifier.    9. Completion   Although you are never finished with CPAP therapy, you should reward yourself by celebrating the completion of your first month of treatment. Expect this first month to be your hardest period of adjustment. It will involve some trial and error as you find the machine, mask and pressure settings that are right for you.    10. Continuation  After your first month of treatment, continue to make a daily commitment to use your CPAP all night, every night and for every nap.    CPAP-Tips to starting with success:  Begin using your CPAP for short periods of time during " the day while you watch TV or read.    Use CPAP every night and for every nap. Using it less often reduces the health benefits and makes it harder for your body to get used to it.    Newer CPAP models are virtually silent; however, if you find the sound of your CPAP machine to be bothersome, place the unit under your bed to dampen the sound.     Make small adjustments to your mask, tubing, straps and headgear until you get the right fit. Tightening the mask may actually worsen the leak.  If it leaves significant marks on your face or irritates the bridge of your nose, it may not be the best mask for you.  Speak with the person who supplied the mask and consider trying other masks.    Use a saline nasal spray to ease mild nasal congestion. Neti-Pot or saline nasal rinses may also help. Nasal gel sprays can help reduce nasal dryness.  Biotene mouthwash can be helpful to protect your teeth if you experience frequent dry mouth.  Dry mouth may be a sign of air escaping out of your mouth or out of the mask in the case of a full face mask.  Speak with your provider if you expect that is the case.     Take a nasal decongestant to relieve more severe nasal or sinus congestion.  Do not use Afrin (oxymetazoline) nasal spray more than 3 days in a row.  Speak with your sleep doctor if your nasal congestion is chronic.    Use a heated humidifier that fits your CPAP model to enhance your breathing comfort. Adjust the heat setting up if you get a dry nose or throat, down if you get condensation in the hose or mask.  Position the CPAP lower than you so that any condensation in the hose drains back into the machine rather than towards the mask.    Try a system that uses nasal pillows if traditional masks give you problems.    Clean your mask, tubing and headgear once a week. Make sure the equipment dries fully.    Regularly check and replace the filters for your CPAP unit and humidifier.    Work closely with your sleep doctor and  "your CPAP supplier to make sure that you have the machine, mask and air pressure setting that works best for you.        MASK DESENSITIZATION:    If you are experiencing some anxiety about trying a PAP mask or breathing with pressurized air try the following steps in sequence to get used to PAP. This is called \"desensitization\".    1.  Wear mask (disconnected from the device) for 60 minutes daily awake and use a distraction: Sit and watch TV, read, or listen to music with the mask on. Take the mask off and put it back on, as needed. This can be in a chair in the living room, for example.    2.  When you can use the mask without taking it off for 60 minutes and without anxiety, then proceed to step 3.    3.  Attach the mask to the PAP device, and switch the unit  on  and practice breathing through the mask for one hour while watching television, reading or performing some other sedentary, distracting activity.     4.  Once you are comfortable wearing PAP for 30-60 minutes without anxiety while distracted with an activity, try to use it in bed. You can continue distraction in bed by watching TV, reading, listening to music or an audio book, etc.  The main goal is to  not think about using PAP  but pay attention to relaxing.    5. Use the PAP during scheduled one-hour naps at home.    6. Use PAP during initial 3-4 hours of nocturnal sleep.    7. Use PAP through an entire night of sleep.      IF YOU FAIL CPAP THERAPY, WE HAPPY DISCUSS WITH YOU THE OTHER TREATMENT OPTIONS FOR SLEEP APNEA INCLUDING ORAL APPLIANCE etc.      Your blood pressure was checked while you were in clinic today.  Please read the guidelines below about what these numbers mean and what you should do about them.  Your systolic blood pressure is the top number.  This is the pressure when the heart is pumping.  Your diastolic blood pressure is the bottom number.  This is the pressure in between beats.  If your systolic blood pressure is less than 120 " and your diastolic blood pressure is less than 80, then your blood pressure is normal. There is nothing more that you need to do about it  If your systolic blood pressure is 120-139 or your diastolic blood pressure is 80-89, your blood pressure may be higher than it should be.  You should have your blood pressure re-checked within a year by a primary care provider.  If your systolic blood pressure is 140 or greater or your diastolic blood pressure is 90 or greater, you may have high blood pressure.  High blood pressure is treatable, but if left untreated over time it can put you at risk for heart attack, stroke, or kidney failure.  You should have your blood pressure re-checked by a primary care provider within the next four weeks.  Your BMI is Body mass index is 47.35 kg/m .  Weight management is a personal decision.  If you are interested in exploring weight loss strategies, the following discussion covers the approaches that may be successful. Body mass index (BMI) is one way to tell whether you are at a healthy weight, overweight, or obese. It measures your weight in relation to your height.  A BMI of 18.5 to 24.9 is in the healthy range. A person with a BMI of 25 to 29.9 is considered overweight, and someone with a BMI of 30 or greater is considered obese. More than two-thirds of American adults are considered overweight or obese.  Being overweight or obese increases the risk for further weight gain. Excess weight may lead to heart disease and diabetes.  Creating and following plans for healthy eating and physical activity may help you improve your health.  Weight control is part of healthy lifestyle and includes exercise, emotional health, and healthy eating habits. Careful eating habits lifelong are the mainstay of weight control. Though there are significant health benefits from weight loss, long-term weight loss with diet alone may be very difficult to achieve- studies show long-term success with dietary  management in less than 10% of people. Attaining a healthy weight may be especially difficult to achieve in those with severe obesity. In some cases, medications, devices and surgical management might be considered.  What can you do?  If you are overweight or obese and are interested in methods for weight loss, you should discuss this with your provider.     Consider reducing daily calorie intake by 500 calories.     Keep a food journal.     Avoiding skipping meals, consider cutting portions instead.    Diet combined with exercise helps maintain muscle while optimizing fat loss. Strength training is particularly important for building and maintaining muscle mass. Exercise helps reduce stress, increase energy, and improves fitness. Increasing exercise without diet control, however, may not burn enough calories to loose weight.       Start walking three days a week 10-20 minutes at a time    Work towards walking thirty minutes five days a week     Eventually, increase the speed of your walking for 1-2 minutes at time    In addition, we recommend that you review healthy lifestyles and methods for weight loss available through the National Institutes of Health patient information sites:  http://win.niddk.nih.gov/publications/index.htm    And look into health and wellness programs that may be available through your health insurance provider, employer, local community center, or ankit club.    Weight management plan: Patient was referred to their PCP to discuss a diet and exercise plan.

## 2018-12-13 ENCOUNTER — OFFICE VISIT (OUTPATIENT)
Dept: CARDIOLOGY | Facility: CLINIC | Age: 53
End: 2018-12-13
Attending: INTERNAL MEDICINE
Payer: COMMERCIAL

## 2018-12-13 VITALS
SYSTOLIC BLOOD PRESSURE: 108 MMHG | WEIGHT: 293 LBS | DIASTOLIC BLOOD PRESSURE: 76 MMHG | HEART RATE: 80 BPM | BODY MASS INDEX: 41.95 KG/M2 | HEIGHT: 70 IN

## 2018-12-13 DIAGNOSIS — I10 BENIGN ESSENTIAL HYPERTENSION: ICD-10-CM

## 2018-12-13 DIAGNOSIS — E03.9 HYPOTHYROIDISM, UNSPECIFIED TYPE: ICD-10-CM

## 2018-12-13 DIAGNOSIS — R00.2 PALPITATIONS: ICD-10-CM

## 2018-12-13 DIAGNOSIS — R00.2 PALPITATIONS: Primary | ICD-10-CM

## 2018-12-13 LAB
ANION GAP SERPL CALCULATED.3IONS-SCNC: 4 MMOL/L (ref 3–14)
BUN SERPL-MCNC: 16 MG/DL (ref 7–30)
CALCIUM SERPL-MCNC: 8.9 MG/DL (ref 8.5–10.1)
CHLORIDE SERPL-SCNC: 104 MMOL/L (ref 94–109)
CO2 SERPL-SCNC: 30 MMOL/L (ref 20–32)
CREAT SERPL-MCNC: 0.72 MG/DL (ref 0.52–1.04)
GFR SERPL CREATININE-BSD FRML MDRD: 84 ML/MIN/1.7M2
GLUCOSE SERPL-MCNC: 114 MG/DL (ref 70–99)
POTASSIUM SERPL-SCNC: 3.9 MMOL/L (ref 3.4–5.3)
SODIUM SERPL-SCNC: 138 MMOL/L (ref 133–144)

## 2018-12-13 PROCEDURE — 36415 COLL VENOUS BLD VENIPUNCTURE: CPT | Performed by: INTERNAL MEDICINE

## 2018-12-13 PROCEDURE — 80048 BASIC METABOLIC PNL TOTAL CA: CPT | Performed by: INTERNAL MEDICINE

## 2018-12-13 PROCEDURE — 99213 OFFICE O/P EST LOW 20 MIN: CPT | Performed by: INTERNAL MEDICINE

## 2018-12-13 RX ORDER — LISINOPRIL 20 MG/1
20 TABLET ORAL AT BEDTIME
Qty: 100 TABLET | Refills: 4 | Status: SHIPPED | OUTPATIENT
Start: 2018-12-13 | End: 2021-10-05

## 2018-12-13 RX ORDER — LISINOPRIL 20 MG/1
20 TABLET ORAL DAILY
Qty: 100 TABLET | Refills: 4 | Status: SHIPPED | OUTPATIENT
Start: 2018-12-13 | End: 2018-12-13

## 2018-12-13 RX ORDER — METOPROLOL SUCCINATE 25 MG/1
25 TABLET, EXTENDED RELEASE ORAL DAILY
Qty: 120 TABLET | Refills: 1 | Status: SHIPPED | OUTPATIENT
Start: 2018-12-13 | End: 2021-10-05

## 2018-12-13 ASSESSMENT — MIFFLIN-ST. JEOR: SCORE: 2216.59

## 2018-12-13 NOTE — LETTER
12/13/2018      Do Stevenson NP  [facility] [address] [place] [address]      RE: Michaela Garcia       Dear Colleague,    I had the pleasure of seeing Michaela Garcia in the [facility].    Service Date: 12/13/2018      PRIMARY CARE PROVIDER:  Do Stevenson CNP      REASONS FOR VISIT:   1.  Followup of palpitations after a trial of metoprolol XL and to review results of cardiac MRI and heart rhythm monitor.     2.  Followup of hypertension after medication adjustment.      HISTORY OF PRESENT ILLNESS:    Michaela is known to me from her initial consult visit a couple of months ago on 09/27/2018.  She was unaccompanied today.  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  Her BMI is 50 kg/m2.      I had seen her for:   1.  Chronic palpitations dating back to several years that have been more noticeable recently.  This is in the context of significant life stressors.  All 3 of her children have special needs.  She is their primary caregiver.  Her young son underwent orthopedic surgery earlier this week  Unfortunately, her  has asked for a separation.  Michaela's delivery truck broke down, and she is worried that she will lose her job.  She continues to have intermittent palpitations that can last for minutes or hours.  She drinks 1-2 servings of caffeine daily, no tobacco, rare alcohol.  She does not have much time to exercise given her life responsibilities and significant knee discomfort.        I had referred her for a sleep study, and it is mildly positive with a total AHI score of approximately 10 per hour.  CPAP is going to be prescribed.  During her sleep study monitoring, she was also noted to have frequent PACs and PVCs.  This is also confirmed on her Ziopatch monitor, which showed 6 runs of nonsustained SVT that lasted 9 beats, during which she did not report  symptoms.  Her symptoms did correlate with premature atrial complexes and ventricular complexes (less than 1%).  Essentially, this was a normal heart rhythm monitor.      Cardiac MRI showed a structurally normal heart.  LVEF 61%, RV of 58%, normal atrial size, normal cardiac valves.  There was a small area of possible basal lateral wall infarct without any inducible ischemia on stress test.      Labs:  Her potassium is 3.9, creatinine 0.72 (on lisinopril), free T4 of 4.9 (normal), free T3 of 105 (normal).      2.  Hypertension.  On her last visit, her BP was elevated at 142/92 mmHg.  I had increased lisinopril from 20-40 mg daily, I advised her to discontinue NSAID and also gave her a prophylactic dose of Toprol XL 25 mg.  This has made no difference to her palpitations, but her blood pressure today is lowish at 122/78 mmHg.  She reported orthostatic dizziness, and indeed on standing, her blood pressure drops to 108/76 mmHg.      PHYSICAL EXAMINATION:   VITAL SIGNS:  Supine /78, pulse 72 BPM.  Standing /76 with a pulse of 80 BPM.  BMI 48 kg/m2.   Comfortable at rest.  I did not do a detailed physical examination today.      DIAGNOSES:   1.  Palpitations, likely secondary to ongoing life stressors and untreated sleep apnea.   2.  Hypertension, stage 1.     3.  Orthostatic hypotension secondary to medication effect.   4.  Borderline hypothyroidism, on replacement therapy.   5.  Recently diagnosed mild sleep apnea.      ASSESSMENT/PLAN:    Michaela has considerable life stressors.  Her  has asked for a separation, she has 3 special needs children and is worried about losing her job.  Her cardiac MRI and Holter are reassuring. The small area of basal lateral infarct described on the cardiac MRI is most likely an artifact.  She does not have inducible ischemia on the cardiac stress test.  I explained to her that I think her palpitations are related to the stress.  The fact that the low-dose Toprol XL has  not alleviated her palpitations is also corroborative.       1.  For now, I recommend continuing with Toprol XL 25 mg daily.  However, I do not want her to stay on it long term as it is not clinically indicated.  Her reported fatigue preceded the metoprolol and has not become any worse with it.  I recommend discontinuing it after 6 months.   2.  She has orthostatic dizziness from the combination of lisinopril and metoprolol.  I have decreased the lisinopril from 40 mg to 20 mg and advised she take it in the evening.  Prescription renewed.   3.  Treating the sleep apnea with CPAP is a good idea.  It might help the palpitations.   4.  I am not sure why she is on low-dose thyroxine.  I suggested that she check with the endocrinologist if stopping the thyroxine is an option.  Some patients with near-normal thyroid function do develop palpitations when they start taking low-dose thyroxine replacement.   5.  I do not think Denis needs to see me on an ongoing basis.  I will be happy to see her as needed.  She has the contact details of my office.      It was my pleasure to visit with this nice lady who is going through a rough patch in life.  I wish her all the best.        cc:   Do Stevenson [facility] [address] WILLIAM LOVE MD             D: 2018   T: 2018   MT: avel      Name:     DENIS CHAPMAN   MRN:      [id number]        Account:      [id number]   :      1965           Service Date: 2018      Document: [id number]           Outpatient Encounter Medications as of 2018   Medication Sig Dispense Refill     IBUPROFEN PO Take by mouth every 4 hours as needed for moderate pain       Levothyroxine Sodium (LEVOTHROID PO) Take 25 mcg by mouth daily       lisinopril (PRINIVIL/ZESTRIL) 20 MG tablet Take 1 tablet (20 mg) by mouth At Bedtime 100 tablet 4     METFORMIN HCL PO Take 500 mg by mouth 3 times  daily (with meals)       metoprolol succinate ER (TOPROL-XL) 25 MG 24 hr tablet Take 1 tablet (25 mg) by mouth daily 120 tablet 1     Naproxen Sod-Diphenhydramine (ALEVE PM PO) Take by mouth At Bedtime        VITAMIN D, CHOLECALCIFEROL, PO Take 50,000 Units by mouth once a week       [DISCONTINUED] lisinopril (PRINIVIL/ZESTRIL) 20 MG tablet Take 1 tablet (20 mg) by mouth daily 100 tablet 4     [DISCONTINUED] lisinopril (PRINIVIL/ZESTRIL) 40 MG tablet Take 1 tablet (40 mg) by mouth daily 30 tablet 3     [DISCONTINUED] metoprolol succinate (TOPROL-XL) 25 MG 24 hr tablet Take 1 tablet (25 mg) by mouth daily 30 tablet 3     [DISCONTINUED] phentermine 37.5 MG capsule Take 37.5 mg by mouth every morning       [DISCONTINUED] zolpidem (AMBIEN) 5 MG tablet Take tablet by mouth 15 minutes prior to sleep, for Sleep Study (Patient not taking: Reported on 12/11/2018) 1 tablet 0     No facility-administered encounter medications on file as of 12/13/2018.        Again, thank you for allowing me to participate in the care of your patient.      Sincerely,    Dawn Longoria MD     [facility]

## 2018-12-13 NOTE — LETTER
12/13/2018    Do Stevenson NP  [facility] [address] [place] [address]    RE: Michaela Garcia       Dear Colleague,    I had the pleasure of seeing Michaela Garcia in the [facility].    Clinic visit note dictated. Dictation reference number - [id number]        REVIEW OF SYSTEMS:  A comprehensive 10-point review of systems was completed and the pertinent positives are documented in the history of present illness.    Skin:  Positive for     Eyes:  Positive for glasses  ENT:  Positive for postnasal drainage;sinus trouble  Respiratory:  Positive for shortness of breath;sleep apnea  Cardiovascular:    Positive for;palpitations;chest pain;lightheadedness;edema;fatigue  Gastroenterology: Positive for reflux;constipation;diarrhea  Genitourinary:  Negative    Musculoskeletal:  Positive for back pain;arthritis;joint pain;nocturnal cramping  Neurologic:  Positive for numbness or tingling of hands  Psychiatric:  Positive for sleep disturbances;anxiety;depression;excessive stress  Heme/Lymph/Imm:  Negative    Endocrine:  Positive for thyroid disorder    CURRENT MEDICATIONS:  Current Outpatient Medications   Medication Sig Dispense Refill     IBUPROFEN PO Take by mouth every 4 hours as needed for moderate pain       Levothyroxine Sodium (LEVOTHROID PO) Take 25 mcg by mouth daily       lisinopril (PRINIVIL/ZESTRIL) 20 MG tablet Take 1 tablet (20 mg) by mouth At Bedtime 100 tablet 4     METFORMIN HCL PO Take 500 mg by mouth 3 times daily (with meals)       metoprolol succinate ER (TOPROL-XL) 25 MG 24 hr tablet Take 1 tablet (25 mg) by mouth daily 120 tablet 1     Naproxen Sod-Diphenhydramine (ALEVE PM PO) Take by mouth At Bedtime        VITAMIN D, CHOLECALCIFEROL, PO Take 50,000 Units by mouth once a week           ALLERGIES:  Allergies   Allergen Reactions     Erythromycin Nausea       PAST MEDICAL HISTORY:    Past Medical History:   Diagnosis Date      "Hashimoto's disease      Hyperglycemia      Hypertension      Obesity      Palpitations        PAST SURGICAL HISTORY:    History reviewed. No pertinent surgical history.    FAMILY HISTORY:    Family History   Adopted: Yes   Problem Relation Age of Onset     Unknown/Adopted Mother      Unknown/Adopted Father        SOCIAL HISTORY:    Social History     Socioeconomic History     Marital status:      Spouse name: None     Number of children: None     Years of education: None     Highest education level: None   Social Needs     Financial resource strain: None     Food insecurity - worry: None     Food insecurity - inability: None     Transportation needs - medical: None     Transportation needs - non-medical: None   Occupational History     None   Tobacco Use     Smoking status: Never Smoker     Smokeless tobacco: Never Used   Substance and Sexual Activity     Alcohol use: Yes     Comment: maybe once a month      Drug use: No     Sexual activity: None   Other Topics Concern     Parent/sibling w/ CABG, MI or angioplasty before 65F 55M? Not Asked   Social History Narrative     None       PHYSICAL EXAM:    Vitals: /76 (BP Location: Left arm, Patient Position: Standing, Cuff Size: Adult Large)   Pulse 80   Ht 1.778 m (5' 10\")   Wt (!) 153.1 kg (337 lb 9.6 oz)   BMI 48.44 kg/m     Wt Readings from Last 5 Encounters:   12/13/18 (!) 153.1 kg (337 lb 9.6 oz)   12/11/18 149.7 kg (330 lb)   10/12/18 (!) 153.3 kg (338 lb)   09/27/18 (!) 156.9 kg (346 lb)       Thank you for allowing me to participate in the care of your patient.      Sincerely,     Dawn Longoria MD     [facility]    cc:   Dawn Longoria MD  [address] [place] [id number]        "

## 2018-12-13 NOTE — PROGRESS NOTES
Service Date: 12/13/2018      PRIMARY CARE PROVIDER:  Do Stevenson CNP      REASONS FOR VISIT:   1.  Followup of palpitations after a trial of metoprolol XL and to review results of cardiac MRI and heart rhythm monitor.     2.  Followup of hypertension after medication adjustment.      HISTORY OF PRESENT ILLNESS:    Michaela is known to me from her initial consult visit a couple of months ago on 09/27/2018.  She was unaccompanied today.  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  Her BMI is 50 kg/m2.      I had seen her for:   1.  Chronic palpitations dating back to several years that have been more noticeable recently.  This is in the context of significant life stressors.  All 3 of her children have special needs.  She is their primary caregiver.  Her young son underwent orthopedic surgery earlier this week  Unfortunately, her  has asked for a separation.  Michaela's delivery truck broke down, and she is worried that she will lose her job.  She continues to have intermittent palpitations that can last for minutes or hours.  She drinks 1-2 servings of caffeine daily, no tobacco, rare alcohol.  She does not have much time to exercise given her life responsibilities and significant knee discomfort.        I had referred her for a sleep study, and it is mildly positive with a total AHI score of approximately 10 per hour.  CPAP is going to be prescribed.  During her sleep study monitoring, she was also noted to have frequent PACs and PVCs.  This is also confirmed on her Ziop[employer] monitor, which showed 6 runs of nonsustained SVT that lasted 9 beats, during which she did not report symptoms.  Her symptoms did correlate with premature atrial complexes and ventricular complexes (less than 1%).  Essentially, this was a normal heart rhythm monitor.      Cardiac MRI showed a structurally normal heart.  LVEF 61%, RV of 58%, normal atrial size, normal  cardiac valves.  There was a small area of possible basal lateral wall infarct without any inducible ischemia on stress test.      Labs:  Her potassium is 3.9, creatinine 0.72 (on lisinopril), free T4 of 4.9 (normal), free T3 of 105 (normal).      2.  Hypertension.  On her last visit, her BP was elevated at 142/92 mmHg.  I had increased lisinopril from 20-40 mg daily, I advised her to discontinue NSAID and also gave her a prophylactic dose of Toprol XL 25 mg.  This has made no difference to her palpitations, but her blood pressure today is lowish at 122/78 mmHg.  She reported orthostatic dizziness, and indeed on standing, her blood pressure drops to 108/76 mmHg.      PHYSICAL EXAMINATION:   VITAL SIGNS:  Supine /78, pulse 72 BPM.  Standing /76 with a pulse of 80 BPM.  BMI 48 kg/m2.   Comfortable at rest.  I did not do a detailed physical examination today.      DIAGNOSES:   1.  Palpitations, likely secondary to ongoing life stressors and untreated sleep apnea.   2.  Hypertension, stage 1.     3.  Orthostatic hypotension secondary to medication effect.   4.  Borderline hypothyroidism, on replacement therapy.   5.  Recently diagnosed mild sleep apnea.      ASSESSMENT/PLAN:    Michaela has considerable life stressors.  Her  has asked for a separation, she has 3 special needs children and is worried about losing her job.  Her cardiac MRI and Holter are reassuring. The small area of basal lateral infarct described on the cardiac MRI is most likely an artifact.  She does not have inducible ischemia on the cardiac stress test.  I explained to her that I think her palpitations are related to the stress.  The fact that the low-dose Toprol XL has not alleviated her palpitations is also corroborative.       1.  For now, I recommend continuing with Toprol XL 25 mg daily.  However, I do not want her to stay on it long term as it is not clinically indicated.  Her reported fatigue preceded the metoprolol and has  not become any worse with it.  I recommend discontinuing it after 6 months.   2.  She has orthostatic dizziness from the combination of lisinopril and metoprolol.  I have decreased the lisinopril from 40 mg to 20 mg and advised she take it in the evening.  Prescription renewed.   3.  Treating the sleep apnea with CPAP is a good idea.  It might help the palpitations.   4.  I am not sure why she is on low-dose thyroxine.  I suggested that she check with the endocrinologist if stopping the thyroxine is an option.  Some patients with near-normal thyroid function do develop palpitations when they start taking low-dose thyroxine replacement.   5.  I do not think Denis needs to see me on an ongoing basis.  I will be happy to see her as needed.  She has the contact details of my office.      It was my pleasure to visit with this nice lady who is going through a rough patch in life.  I wish her all the best.        cc:   Do Stevenson [facility] [address] WILLIAM LOVE MD             D: 2018   T: 2018   MT: avel      Name:     DENIS CHAPMAN   MRN:      [id number]        Account:      [id number]   :      1965           Service Date: 2018      Document: [id number]

## 2018-12-13 NOTE — PROGRESS NOTES
Clinic visit note dictated. Dictation reference number - [id number]        REVIEW OF SYSTEMS:  A comprehensive 10-point review of systems was completed and the pertinent positives are documented in the history of present illness.    Skin:  Positive for     Eyes:  Positive for glasses  ENT:  Positive for postnasal drainage;sinus trouble  Respiratory:  Positive for shortness of breath;sleep apnea  Cardiovascular:    Positive for;palpitations;chest pain;lightheadedness;edema;fatigue  Gastroenterology: Positive for reflux;constipation;diarrhea  Genitourinary:  Negative    Musculoskeletal:  Positive for back pain;arthritis;joint pain;nocturnal cramping  Neurologic:  Positive for numbness or tingling of hands  Psychiatric:  Positive for sleep disturbances;anxiety;depression;excessive stress  Heme/Lymph/Imm:  Negative    Endocrine:  Positive for thyroid disorder    CURRENT MEDICATIONS:  Current Outpatient Medications   Medication Sig Dispense Refill     IBUPROFEN PO Take by mouth every 4 hours as needed for moderate pain       Levothyroxine Sodium (LEVOTHROID PO) Take 25 mcg by mouth daily       lisinopril (PRINIVIL/ZESTRIL) 20 MG tablet Take 1 tablet (20 mg) by mouth At Bedtime 100 tablet 4     METFORMIN HCL PO Take 500 mg by mouth 3 times daily (with meals)       metoprolol succinate ER (TOPROL-XL) 25 MG 24 hr tablet Take 1 tablet (25 mg) by mouth daily 120 tablet 1     Naproxen Sod-Diphenhydramine (ALEVE PM PO) Take by mouth At Bedtime        VITAMIN D, CHOLECALCIFEROL, PO Take 50,000 Units by mouth once a week           ALLERGIES:  Allergies   Allergen Reactions     Erythromycin Nausea       PAST MEDICAL HISTORY:    Past Medical History:   Diagnosis Date     Hashimoto's disease      Hyperglycemia      Hypertension      Obesity      Palpitations        PAST SURGICAL HISTORY:    History reviewed. No pertinent surgical history.    FAMILY HISTORY:    Family History   Adopted: Yes   Problem Relation Age of Onset      "Unknown/Adopted Mother      Unknown/Adopted Father        SOCIAL HISTORY:    Social History     Socioeconomic History     Marital status:      Spouse name: None     Number of children: None     Years of education: None     Highest education level: None   Social Needs     Financial resource strain: None     Food insecurity - worry: None     Food insecurity - inability: None     Transportation needs - medical: None     Transportation needs - non-medical: None   Occupational History     None   Tobacco Use     Smoking status: Never Smoker     Smokeless tobacco: Never Used   Substance and Sexual Activity     Alcohol use: Yes     Comment: maybe once a month      Drug use: No     Sexual activity: None   Other Topics Concern     Parent/sibling w/ CABG, MI or angioplasty before 65F 55M? Not Asked   Social History Narrative     None       PHYSICAL EXAM:    Vitals: /76 (BP Location: Left arm, Patient Position: Standing, Cuff Size: Adult Large)   Pulse 80   Ht 1.778 m (5' 10\")   Wt (!) 153.1 kg (337 lb 9.6 oz)   BMI 48.44 kg/m    Wt Readings from Last 5 Encounters:   12/13/18 (!) 153.1 kg (337 lb 9.6 oz)   12/11/18 149.7 kg (330 lb)   10/12/18 (!) 153.3 kg (338 lb)   09/27/18 (!) 156.9 kg (346 lb)       "

## 2018-12-13 NOTE — PATIENT INSTRUCTIONS
MEDICATION CHANGES:  1.  I think the combination of lisinopril and metoprolol are making your blood pressure too low - causing the dizziness.  Therefore, I recommend that you decrease the dose of lisinopril from 40 mg to 20 mg daily and take the pill in the evening (instead of morning).  2.  Continue to take metoprolol XL 25 mg in the morning.  3.  I have renewed prescriptions for metoprolol XL 25 mg and lisinopril 20 mg.  4.  I do not think you should be on metoprolol XL long-term.  As discussed, if things are stable in 6 months, please discontinue it.  5.  Occasionally, taking thyroxine when your thyroid function is nearly normal can also cause palpitations.  Please check with your endocrinologist if it is an option to discontinue the thyroxine.  6.  I recommend that you do not take phentermine because of the future risk of pulmonary hypertension (although this risk is very low).  7.  As discussed, your cardiac testing (cardiac MRI and heart rhythm monitor) were both reassuring.    8.  I believe your palpitations may be due to a combination of the excess stress you are under and possibly untreated sleep apnea.   CPAP is a good idea.  9.  You do not need to see me on a regular basis.  If an issue arises, I will be happy to see you.  Please contact my nurses at the number below.    If you have any questions or concerns, please contact my nurses at [phone].

## 2019-01-02 ENCOUNTER — DOCUMENTATION ONLY (OUTPATIENT)
Dept: SLEEP MEDICINE | Facility: CLINIC | Age: 54
End: 2019-01-02

## 2019-01-02 NOTE — PROGRESS NOTES
This nurse faxed Letter of Medical Necessity and DME orders to Taina Alvares at [facility] ([facility]) [phone] today.    Gail Landrum LPN

## 2019-11-06 ENCOUNTER — HEALTH MAINTENANCE LETTER (OUTPATIENT)
Age: 54
End: 2019-11-06

## 2019-11-12 ENCOUNTER — OFFICE VISIT (OUTPATIENT)
Dept: URGENT CARE | Facility: URGENT CARE | Age: 54
End: 2019-11-12
Payer: COMMERCIAL

## 2019-11-12 ENCOUNTER — ANCILLARY PROCEDURE (OUTPATIENT)
Dept: GENERAL RADIOLOGY | Facility: CLINIC | Age: 54
End: 2019-11-12
Attending: PHYSICIAN ASSISTANT
Payer: COMMERCIAL

## 2019-11-12 VITALS
HEART RATE: 68 BPM | HEIGHT: 70 IN | DIASTOLIC BLOOD PRESSURE: 82 MMHG | SYSTOLIC BLOOD PRESSURE: 124 MMHG | OXYGEN SATURATION: 98 % | WEIGHT: 293 LBS | BODY MASS INDEX: 41.95 KG/M2 | TEMPERATURE: 98.3 F

## 2019-11-12 DIAGNOSIS — R07.81 RIB PAIN ON RIGHT SIDE: Primary | ICD-10-CM

## 2019-11-12 DIAGNOSIS — R07.81 RIB PAIN ON RIGHT SIDE: ICD-10-CM

## 2019-11-12 PROCEDURE — 71101 X-RAY EXAM UNILAT RIBS/CHEST: CPT | Mod: RT

## 2019-11-12 PROCEDURE — 99202 OFFICE O/P NEW SF 15 MIN: CPT | Performed by: PHYSICIAN ASSISTANT

## 2019-11-12 RX ORDER — CYCLOBENZAPRINE HCL 10 MG
10 TABLET ORAL 3 TIMES DAILY PRN
Qty: 30 TABLET | Refills: 0 | Status: SHIPPED | OUTPATIENT
Start: 2019-11-12 | End: 2019-11-22

## 2019-11-12 ASSESSMENT — PAIN SCALES - GENERAL: PAINLEVEL: SEVERE PAIN (7)

## 2019-11-12 ASSESSMENT — MIFFLIN-ST. JEOR: SCORE: 2177.12

## 2019-11-12 NOTE — PATIENT INSTRUCTIONS
DO NOT TAKE MUSCLE RELAXER IF DRIVING OR OPERATING MACHINERY    DEEP INHALATIONS X3 EVERY 20-30 MINUTES WHILE AWAKE    ICE AREA    LIMIT TWISTING MOVEMENTS    FOLLOW UP IN 1 WEEK IF NOT IMPROVING

## 2019-11-14 NOTE — PROGRESS NOTES
"SUBJECTIVE:  Chief Complaint   Patient presents with     Urgent Care     Chest Pain     leaning over chest freezer and heard a pop and intense pain on right rib under breast 5 days ago. tx: joanne BA     Michaela Garcia is a 54 year old female presents with a chief complaint of right rib pain.  The injury occurred 5 day(s) ago.   The injury happened while at home. How: as aboveimmediate pain.  The patient complained of moderate and increasing pain  and has had decreased ROM.  Pain exacerbated by twisting and flexion/extension.  Relieved by rest and ice.  She treated it initially with Ibuprofen. This is the first time this type of injury has occurred to this patient.     Past Medical History:   Diagnosis Date     Hashimoto's disease      Hyperglycemia      Hypertension      Obesity      Palpitations      Current Outpatient Medications   Medication Sig Dispense Refill     cyclobenzaprine (FLEXERIL) 10 MG tablet Take 1 tablet (10 mg) by mouth 3 times daily as needed for muscle spasms 30 tablet 0     IBUPROFEN PO Take by mouth every 4 hours as needed for moderate pain       lisinopril (PRINIVIL/ZESTRIL) 20 MG tablet Take 1 tablet (20 mg) by mouth At Bedtime 100 tablet 4     metoprolol succinate ER (TOPROL-XL) 25 MG 24 hr tablet Take 1 tablet (25 mg) by mouth daily 120 tablet 1     Naproxen Sod-Diphenhydramine (ALEVE PM PO) Take by mouth At Bedtime        VITAMIN D, CHOLECALCIFEROL, PO Take 50,000 Units by mouth once a week       Levothyroxine Sodium (LEVOTHROID PO) Take 25 mcg by mouth daily       Social History     Tobacco Use     Smoking status: Never Smoker     Smokeless tobacco: Never Used   Substance Use Topics     Alcohol use: Yes     Comment: maybe once a month        ROS:  10 point ROS negative except as listed above      EXAM:   /82   Pulse 68   Temp 98.3  F (36.8  C) (Tympanic)   Ht 1.778 m (5' 10\")   Wt 149.7 kg (330 lb)   SpO2 98%   BMI 47.35 kg/m    Gen: healthy,alert,no distress  Extremity: " tender inferior ribs  NECK: supple, non-tender to palpation, FROM   CHEST: clear to auscultation  CV: regular rate and rhythm  EXTREMITIES: peripheral pulses normal  MS: no gross deformities noted, no evidence of inflammation in joints, FROM in all extremities.  SKIN: no suspicious lesions or rashes  NEURO: Normal strength and tone, sensory exam grossly normal, mentation intact and speech normal    X-ray image initially interpreted in clinic by me in order to rule out fracture/dislocation.  No evidence appreciated.      ASSESSMENT:   (R07.81) Rib pain on right side  (primary encounter diagnosis)  Comment: no evidence of fracture  Plan: XR Ribs & Chest Right G/E 3 Views,         cyclobenzaprine (FLEXERIL) 10 MG tablet      Red flags and emergent follow up discussed, and understood by patient  Follow up with PCP if symptoms worsen or fail to improve      Patient Instructions   DO NOT TAKE MUSCLE RELAXER IF DRIVING OR OPERATING MACHINERY    DEEP INHALATIONS X3 EVERY 20-30 MINUTES WHILE AWAKE    ICE AREA    LIMIT TWISTING MOVEMENTS    FOLLOW UP IN 1 WEEK IF NOT IMPROVING

## 2020-11-29 ENCOUNTER — HEALTH MAINTENANCE LETTER (OUTPATIENT)
Age: 55
End: 2020-11-29

## 2021-03-22 ENCOUNTER — IMMUNIZATION (OUTPATIENT)
Dept: NURSING | Facility: CLINIC | Age: 56
End: 2021-03-22
Payer: COMMERCIAL

## 2021-03-22 PROCEDURE — 91300 PR COVID VAC PFIZER DIL RECON 30 MCG/0.3 ML IM: CPT

## 2021-03-22 PROCEDURE — 0001A PR COVID VAC PFIZER DIL RECON 30 MCG/0.3 ML IM: CPT

## 2021-04-12 ENCOUNTER — IMMUNIZATION (OUTPATIENT)
Dept: NURSING | Facility: CLINIC | Age: 56
End: 2021-04-12
Attending: INTERNAL MEDICINE
Payer: COMMERCIAL

## 2021-04-12 PROCEDURE — 91300 PR COVID VAC PFIZER DIL RECON 30 MCG/0.3 ML IM: CPT

## 2021-04-12 PROCEDURE — 0002A PR COVID VAC PFIZER DIL RECON 30 MCG/0.3 ML IM: CPT

## 2021-09-19 ENCOUNTER — HEALTH MAINTENANCE LETTER (OUTPATIENT)
Age: 56
End: 2021-09-19

## 2021-09-24 DIAGNOSIS — Z11.59 ENCOUNTER FOR SCREENING FOR OTHER VIRAL DISEASES: ICD-10-CM

## 2021-10-05 RX ORDER — THYROID 30 MG/1
60 TABLET ORAL DAILY
COMMUNITY

## 2021-10-05 ASSESSMENT — MIFFLIN-ST. JEOR: SCORE: 1804.24

## 2021-10-14 ENCOUNTER — LAB (OUTPATIENT)
Dept: LAB | Facility: CLINIC | Age: 56
End: 2021-10-14
Payer: COMMERCIAL

## 2021-10-14 DIAGNOSIS — Z11.59 ENCOUNTER FOR SCREENING FOR OTHER VIRAL DISEASES: ICD-10-CM

## 2021-10-14 LAB — SARS-COV-2 RNA RESP QL NAA+PROBE: NEGATIVE

## 2021-10-14 PROCEDURE — U0005 INFEC AGEN DETEC AMPLI PROBE: HCPCS

## 2021-10-14 PROCEDURE — U0003 INFECTIOUS AGENT DETECTION BY NUCLEIC ACID (DNA OR RNA); SEVERE ACUTE RESPIRATORY SYNDROME CORONAVIRUS 2 (SARS-COV-2) (CORONAVIRUS DISEASE [COVID-19]), AMPLIFIED PROBE TECHNIQUE, MAKING USE OF HIGH THROUGHPUT TECHNOLOGIES AS DESCRIBED BY CMS-2020-01-R: HCPCS

## 2021-10-16 NOTE — PHARMACY-ADMISSION MEDICATION HISTORY
Admission medication history interview status for this patient is complete. See [facility] admission navigator for allergy information, prior to admission medications and immunization status.     Medication history and patient interview completed by pre-admitting RN (Ketty Deleon, JOSUÉ). Reviewed by pharmacist, including [facility] dispense records, [facility] Everywhere, chart review, and other available resources.     Venkatesh Newton, Pharm.D.      Prior to Admission medications    Medication Sig Last Dose Taking? Auth Provider   IBUPROFEN PO Take by mouth every 4 hours as needed for moderate pain  Yes Reported, Patient   Naproxen Sod-Diphenhydramine (ALEVE PM PO) Take by mouth At Bedtime   Yes Reported, Patient   thyroid (ARMOUR) 30 MG tablet Take 30 mg by mouth daily  Yes Reported, Patient   VITAMIN D, CHOLECALCIFEROL, PO Take 50,000 Units by mouth once a week  Yes Reported, Patient

## 2021-10-18 ENCOUNTER — ANESTHESIA EVENT (OUTPATIENT)
Dept: SURGERY | Facility: CLINIC | Age: 56
End: 2021-10-18
Payer: COMMERCIAL

## 2021-10-18 ENCOUNTER — APPOINTMENT (OUTPATIENT)
Dept: GENERAL RADIOLOGY | Facility: CLINIC | Age: 56
End: 2021-10-18
Attending: ORTHOPAEDIC SURGERY
Payer: COMMERCIAL

## 2021-10-18 ENCOUNTER — APPOINTMENT (OUTPATIENT)
Dept: PHYSICAL THERAPY | Facility: CLINIC | Age: 56
End: 2021-10-18
Attending: ORTHOPAEDIC SURGERY
Payer: COMMERCIAL

## 2021-10-18 ENCOUNTER — HOSPITAL ENCOUNTER (OUTPATIENT)
Facility: CLINIC | Age: 56
Discharge: HOME | End: 2021-10-19
Attending: ORTHOPAEDIC SURGERY | Admitting: ORTHOPAEDIC SURGERY
Payer: COMMERCIAL

## 2021-10-18 ENCOUNTER — ANESTHESIA (OUTPATIENT)
Dept: SURGERY | Facility: CLINIC | Age: 56
End: 2021-10-18
Payer: COMMERCIAL

## 2021-10-18 DIAGNOSIS — Z96.651 S/P TOTAL KNEE ARTHROPLASTY, RIGHT: Primary | ICD-10-CM

## 2021-10-18 LAB
CREAT SERPL-MCNC: 0.67 MG/DL (ref 0.52–1.04)
GFR SERPL CREATININE-BSD FRML MDRD: >90 ML/MIN/1.73M2
PLATELET # BLD AUTO: 276 10E3/UL (ref 150–450)

## 2021-10-18 PROCEDURE — 99207 PR CDG-CODE CATEGORY CHANGED: CPT | Performed by: PHYSICIAN ASSISTANT

## 2021-10-18 PROCEDURE — 250N000011 HC RX IP 250 OP 636: Performed by: ANESTHESIOLOGY

## 2021-10-18 PROCEDURE — 250N000011 HC RX IP 250 OP 636: Performed by: ORTHOPAEDIC SURGERY

## 2021-10-18 PROCEDURE — 258N000003 HC RX IP 258 OP 636: Performed by: ANESTHESIOLOGY

## 2021-10-18 PROCEDURE — 258N000003 HC RX IP 258 OP 636: Performed by: ORTHOPAEDIC SURGERY

## 2021-10-18 PROCEDURE — 82565 ASSAY OF CREATININE: CPT | Performed by: ORTHOPAEDIC SURGERY

## 2021-10-18 PROCEDURE — 97116 GAIT TRAINING THERAPY: CPT | Mod: GP | Performed by: PHYSICAL THERAPIST

## 2021-10-18 PROCEDURE — 97161 PT EVAL LOW COMPLEX 20 MIN: CPT | Mod: GP | Performed by: PHYSICAL THERAPIST

## 2021-10-18 PROCEDURE — 250N000009 HC RX 250: Performed by: NURSE ANESTHETIST, CERTIFIED REGISTERED

## 2021-10-18 PROCEDURE — 272N000001 HC OR GENERAL SUPPLY STERILE: Performed by: ORTHOPAEDIC SURGERY

## 2021-10-18 PROCEDURE — 99213 OFFICE O/P EST LOW 20 MIN: CPT | Performed by: PHYSICIAN ASSISTANT

## 2021-10-18 PROCEDURE — 250N000011 HC RX IP 250 OP 636: Performed by: NURSE ANESTHETIST, CERTIFIED REGISTERED

## 2021-10-18 PROCEDURE — 250N000013 HC RX MED GY IP 250 OP 250 PS 637: Performed by: ORTHOPAEDIC SURGERY

## 2021-10-18 PROCEDURE — 97110 THERAPEUTIC EXERCISES: CPT | Mod: GP | Performed by: PHYSICAL THERAPIST

## 2021-10-18 PROCEDURE — 250N000009 HC RX 250: Performed by: ORTHOPAEDIC SURGERY

## 2021-10-18 PROCEDURE — 710N000009 HC RECOVERY PHASE 1, LEVEL 1, PER MIN: Performed by: ORTHOPAEDIC SURGERY

## 2021-10-18 PROCEDURE — 258N000001 HC RX 258: Performed by: ORTHOPAEDIC SURGERY

## 2021-10-18 PROCEDURE — 999N000141 HC STATISTIC PRE-PROCEDURE NURSING ASSESSMENT: Performed by: ORTHOPAEDIC SURGERY

## 2021-10-18 PROCEDURE — 85049 AUTOMATED PLATELET COUNT: CPT | Performed by: ORTHOPAEDIC SURGERY

## 2021-10-18 PROCEDURE — 278N000051 HC OR IMPLANT GENERAL: Performed by: ORTHOPAEDIC SURGERY

## 2021-10-18 PROCEDURE — 370N000017 HC ANESTHESIA TECHNICAL FEE, PER MIN: Performed by: ORTHOPAEDIC SURGERY

## 2021-10-18 PROCEDURE — 999N000065 XR KNEE PORT RIGHT 1/2 VIEWS: Mod: RT

## 2021-10-18 PROCEDURE — 36415 COLL VENOUS BLD VENIPUNCTURE: CPT | Performed by: ORTHOPAEDIC SURGERY

## 2021-10-18 PROCEDURE — C1776 JOINT DEVICE (IMPLANTABLE): HCPCS | Performed by: ORTHOPAEDIC SURGERY

## 2021-10-18 PROCEDURE — 360N000077 HC SURGERY LEVEL 4, PER MIN: Performed by: ORTHOPAEDIC SURGERY

## 2021-10-18 PROCEDURE — 250N000009 HC RX 250: Performed by: ANESTHESIOLOGY

## 2021-10-18 DEVICE — BONE CEMENT STRK SIMPLEX P SPEEDSET 6192-1-001: Type: IMPLANTABLE DEVICE | Site: KNEE | Status: FUNCTIONAL

## 2021-10-18 DEVICE — IMPLANTABLE DEVICE: Type: IMPLANTABLE DEVICE | Site: KNEE | Status: FUNCTIONAL

## 2021-10-18 DEVICE — IMP COMP TKA IBALANCE MOD TIBIAL TRAY SZ 6 AR-513-T6: Type: IMPLANTABLE DEVICE | Site: KNEE | Status: FUNCTIONAL

## 2021-10-18 DEVICE — PATELLA IMP DOME 34 X 9MM VIT-E AR-524-PSC9: Type: IMPLANTABLE DEVICE | Site: KNEE | Status: FUNCTIONAL

## 2021-10-18 RX ORDER — BISACODYL 10 MG
10 SUPPOSITORY, RECTAL RECTAL DAILY PRN
Status: DISCONTINUED | OUTPATIENT
Start: 2021-10-18 | End: 2021-10-19 | Stop reason: HOSPADM

## 2021-10-18 RX ORDER — OXYCODONE HYDROCHLORIDE 5 MG/1
10 TABLET ORAL EVERY 4 HOURS PRN
Status: DISCONTINUED | OUTPATIENT
Start: 2021-10-18 | End: 2021-10-19 | Stop reason: HOSPADM

## 2021-10-18 RX ORDER — PROPOFOL 10 MG/ML
INJECTION, EMULSION INTRAVENOUS CONTINUOUS PRN
Status: DISCONTINUED | OUTPATIENT
Start: 2021-10-18 | End: 2021-10-18

## 2021-10-18 RX ORDER — HYDROMORPHONE HCL IN WATER/PF 6 MG/30 ML
0.2 PATIENT CONTROLLED ANALGESIA SYRINGE INTRAVENOUS
Status: DISCONTINUED | OUTPATIENT
Start: 2021-10-18 | End: 2021-10-19 | Stop reason: HOSPADM

## 2021-10-18 RX ORDER — BUPIVACAINE HYDROCHLORIDE AND EPINEPHRINE 5; 5 MG/ML; UG/ML
INJECTION, SOLUTION PERINEURAL
Status: COMPLETED | OUTPATIENT
Start: 2021-10-18 | End: 2021-10-18

## 2021-10-18 RX ORDER — VANCOMYCIN HYDROCHLORIDE 1 G/20ML
INJECTION, POWDER, LYOPHILIZED, FOR SOLUTION INTRAVENOUS PRN
Status: DISCONTINUED | OUTPATIENT
Start: 2021-10-18 | End: 2021-10-18 | Stop reason: HOSPADM

## 2021-10-18 RX ORDER — FENTANYL CITRATE 50 UG/ML
50 INJECTION, SOLUTION INTRAMUSCULAR; INTRAVENOUS EVERY 5 MIN PRN
Status: DISCONTINUED | OUTPATIENT
Start: 2021-10-18 | End: 2021-10-18 | Stop reason: HOSPADM

## 2021-10-18 RX ORDER — ONDANSETRON 2 MG/ML
INJECTION INTRAMUSCULAR; INTRAVENOUS PRN
Status: DISCONTINUED | OUTPATIENT
Start: 2021-10-18 | End: 2021-10-18

## 2021-10-18 RX ORDER — OXYCODONE HYDROCHLORIDE 5 MG/1
5 TABLET ORAL EVERY 4 HOURS PRN
Status: DISCONTINUED | OUTPATIENT
Start: 2021-10-18 | End: 2021-10-18 | Stop reason: HOSPADM

## 2021-10-18 RX ORDER — NALOXONE HYDROCHLORIDE 0.4 MG/ML
0.4 INJECTION, SOLUTION INTRAMUSCULAR; INTRAVENOUS; SUBCUTANEOUS
Status: DISCONTINUED | OUTPATIENT
Start: 2021-10-18 | End: 2021-10-19 | Stop reason: HOSPADM

## 2021-10-18 RX ORDER — TRANEXAMIC ACID 10 MG/ML
1 INJECTION, SOLUTION INTRAVENOUS ONCE
Status: COMPLETED | OUTPATIENT
Start: 2021-10-18 | End: 2021-10-18

## 2021-10-18 RX ORDER — HYDROXYZINE HYDROCHLORIDE 25 MG/1
25 TABLET, FILM COATED ORAL EVERY 6 HOURS PRN
Status: DISCONTINUED | OUTPATIENT
Start: 2021-10-18 | End: 2021-10-19 | Stop reason: HOSPADM

## 2021-10-18 RX ORDER — LIDOCAINE 40 MG/G
CREAM TOPICAL
Status: DISCONTINUED | OUTPATIENT
Start: 2021-10-18 | End: 2021-10-18 | Stop reason: HOSPADM

## 2021-10-18 RX ORDER — SODIUM CHLORIDE, SODIUM LACTATE, POTASSIUM CHLORIDE, CALCIUM CHLORIDE 600; 310; 30; 20 MG/100ML; MG/100ML; MG/100ML; MG/100ML
INJECTION, SOLUTION INTRAVENOUS CONTINUOUS
Status: DISCONTINUED | OUTPATIENT
Start: 2021-10-18 | End: 2021-10-18 | Stop reason: HOSPADM

## 2021-10-18 RX ORDER — HYDRALAZINE HYDROCHLORIDE 10 MG/1
10 TABLET, FILM COATED ORAL EVERY 8 HOURS PRN
Status: DISCONTINUED | OUTPATIENT
Start: 2021-10-18 | End: 2021-10-19 | Stop reason: HOSPADM

## 2021-10-18 RX ORDER — ACETAMINOPHEN 325 MG/1
975 TABLET ORAL EVERY 8 HOURS
Status: DISCONTINUED | OUTPATIENT
Start: 2021-10-18 | End: 2021-10-19 | Stop reason: HOSPADM

## 2021-10-18 RX ORDER — LABETALOL HYDROCHLORIDE 5 MG/ML
10 INJECTION, SOLUTION INTRAVENOUS
Status: DISCONTINUED | OUTPATIENT
Start: 2021-10-18 | End: 2021-10-18 | Stop reason: HOSPADM

## 2021-10-18 RX ORDER — CEFAZOLIN SODIUM/WATER 2 G/20 ML
2 SYRINGE (ML) INTRAVENOUS
Status: COMPLETED | OUTPATIENT
Start: 2021-10-18 | End: 2021-10-18

## 2021-10-18 RX ORDER — FAMOTIDINE 20 MG/1
20 TABLET, FILM COATED ORAL 2 TIMES DAILY
Status: DISCONTINUED | OUTPATIENT
Start: 2021-10-18 | End: 2021-10-19 | Stop reason: HOSPADM

## 2021-10-18 RX ORDER — PROCHLORPERAZINE MALEATE 5 MG
10 TABLET ORAL EVERY 6 HOURS PRN
Status: DISCONTINUED | OUTPATIENT
Start: 2021-10-18 | End: 2021-10-19 | Stop reason: HOSPADM

## 2021-10-18 RX ORDER — OXYCODONE HYDROCHLORIDE 5 MG/1
5 TABLET ORAL EVERY 4 HOURS PRN
Status: DISCONTINUED | OUTPATIENT
Start: 2021-10-18 | End: 2021-10-19 | Stop reason: HOSPADM

## 2021-10-18 RX ORDER — CEFAZOLIN SODIUM/WATER 2 G/20 ML
2 SYRINGE (ML) INTRAVENOUS SEE ADMIN INSTRUCTIONS
Status: DISCONTINUED | OUTPATIENT
Start: 2021-10-18 | End: 2021-10-18 | Stop reason: HOSPADM

## 2021-10-18 RX ORDER — LIDOCAINE 40 MG/G
CREAM TOPICAL
Status: DISCONTINUED | OUTPATIENT
Start: 2021-10-18 | End: 2021-10-19 | Stop reason: HOSPADM

## 2021-10-18 RX ORDER — ACETAMINOPHEN 325 MG/1
650 TABLET ORAL EVERY 4 HOURS PRN
Status: DISCONTINUED | OUTPATIENT
Start: 2021-10-21 | End: 2021-10-19 | Stop reason: HOSPADM

## 2021-10-18 RX ORDER — BUPIVACAINE HYDROCHLORIDE 7.5 MG/ML
INJECTION, SOLUTION INTRASPINAL
Status: COMPLETED | OUTPATIENT
Start: 2021-10-18 | End: 2021-10-18

## 2021-10-18 RX ORDER — ACETAMINOPHEN 500 MG
2 TABLET ORAL PRN
Status: ON HOLD | COMMUNITY
Start: 2021-10-01 | End: 2022-03-23

## 2021-10-18 RX ORDER — OXYCODONE HYDROCHLORIDE 5 MG/1
5-10 TABLET ORAL
Qty: 60 TABLET | Refills: 0 | Status: ON HOLD | OUTPATIENT
Start: 2021-10-18 | End: 2022-03-23

## 2021-10-18 RX ORDER — AMOXICILLIN 250 MG
1-2 CAPSULE ORAL 2 TIMES DAILY
Qty: 30 TABLET | Refills: 0 | Status: ON HOLD | OUTPATIENT
Start: 2021-10-18 | End: 2022-03-24

## 2021-10-18 RX ORDER — THYROID 30 MG/1
30 TABLET ORAL DAILY
Status: DISCONTINUED | OUTPATIENT
Start: 2021-10-19 | End: 2021-10-19 | Stop reason: HOSPADM

## 2021-10-18 RX ORDER — HYDROMORPHONE HCL IN WATER/PF 6 MG/30 ML
0.4 PATIENT CONTROLLED ANALGESIA SYRINGE INTRAVENOUS
Status: DISCONTINUED | OUTPATIENT
Start: 2021-10-18 | End: 2021-10-19 | Stop reason: HOSPADM

## 2021-10-18 RX ORDER — SODIUM CHLORIDE, SODIUM LACTATE, POTASSIUM CHLORIDE, CALCIUM CHLORIDE 600; 310; 30; 20 MG/100ML; MG/100ML; MG/100ML; MG/100ML
INJECTION, SOLUTION INTRAVENOUS CONTINUOUS
Status: DISCONTINUED | OUTPATIENT
Start: 2021-10-18 | End: 2021-10-19 | Stop reason: HOSPADM

## 2021-10-18 RX ORDER — ONDANSETRON 4 MG/1
4 TABLET, ORALLY DISINTEGRATING ORAL EVERY 30 MIN PRN
Status: DISCONTINUED | OUTPATIENT
Start: 2021-10-18 | End: 2021-10-18 | Stop reason: HOSPADM

## 2021-10-18 RX ORDER — ONDANSETRON 2 MG/ML
4 INJECTION INTRAMUSCULAR; INTRAVENOUS EVERY 6 HOURS PRN
Status: DISCONTINUED | OUTPATIENT
Start: 2021-10-18 | End: 2021-10-19 | Stop reason: HOSPADM

## 2021-10-18 RX ORDER — HYDROMORPHONE HCL IN WATER/PF 6 MG/30 ML
0.4 PATIENT CONTROLLED ANALGESIA SYRINGE INTRAVENOUS EVERY 5 MIN PRN
Status: DISCONTINUED | OUTPATIENT
Start: 2021-10-18 | End: 2021-10-18 | Stop reason: HOSPADM

## 2021-10-18 RX ORDER — NALOXONE HYDROCHLORIDE 0.4 MG/ML
0.2 INJECTION, SOLUTION INTRAMUSCULAR; INTRAVENOUS; SUBCUTANEOUS
Status: DISCONTINUED | OUTPATIENT
Start: 2021-10-18 | End: 2021-10-19 | Stop reason: HOSPADM

## 2021-10-18 RX ORDER — ONDANSETRON 2 MG/ML
4 INJECTION INTRAMUSCULAR; INTRAVENOUS EVERY 30 MIN PRN
Status: DISCONTINUED | OUTPATIENT
Start: 2021-10-18 | End: 2021-10-18 | Stop reason: HOSPADM

## 2021-10-18 RX ORDER — AMOXICILLIN 250 MG
1 CAPSULE ORAL 2 TIMES DAILY
Status: DISCONTINUED | OUTPATIENT
Start: 2021-10-18 | End: 2021-10-19 | Stop reason: HOSPADM

## 2021-10-18 RX ORDER — HYDRALAZINE HYDROCHLORIDE 20 MG/ML
2.5-5 INJECTION INTRAMUSCULAR; INTRAVENOUS EVERY 10 MIN PRN
Status: DISCONTINUED | OUTPATIENT
Start: 2021-10-18 | End: 2021-10-18 | Stop reason: HOSPADM

## 2021-10-18 RX ORDER — POLYETHYLENE GLYCOL 3350 17 G/17G
17 POWDER, FOR SOLUTION ORAL DAILY
Status: DISCONTINUED | OUTPATIENT
Start: 2021-10-19 | End: 2021-10-19 | Stop reason: HOSPADM

## 2021-10-18 RX ORDER — HYDROXYZINE HYDROCHLORIDE 25 MG/1
25 TABLET, FILM COATED ORAL EVERY 6 HOURS PRN
Qty: 30 TABLET | Refills: 0 | Status: SHIPPED | OUTPATIENT
Start: 2021-10-18

## 2021-10-18 RX ORDER — ACETAMINOPHEN 325 MG/1
650 TABLET ORAL EVERY 4 HOURS PRN
Qty: 100 TABLET | Refills: 0 | Status: SHIPPED | OUTPATIENT
Start: 2021-10-18

## 2021-10-18 RX ORDER — ONDANSETRON 4 MG/1
4 TABLET, ORALLY DISINTEGRATING ORAL EVERY 6 HOURS PRN
Status: DISCONTINUED | OUTPATIENT
Start: 2021-10-18 | End: 2021-10-19 | Stop reason: HOSPADM

## 2021-10-18 RX ORDER — ONDANSETRON 2 MG/ML
4 INJECTION INTRAMUSCULAR; INTRAVENOUS EVERY 6 HOURS
Status: DISCONTINUED | OUTPATIENT
Start: 2021-10-18 | End: 2021-10-19 | Stop reason: HOSPADM

## 2021-10-18 RX ORDER — ALBUTEROL SULFATE 0.83 MG/ML
2.5 SOLUTION RESPIRATORY (INHALATION) EVERY 4 HOURS PRN
Status: DISCONTINUED | OUTPATIENT
Start: 2021-10-18 | End: 2021-10-18 | Stop reason: HOSPADM

## 2021-10-18 RX ORDER — CEFAZOLIN SODIUM 2 G/100ML
2 INJECTION, SOLUTION INTRAVENOUS EVERY 8 HOURS
Status: COMPLETED | OUTPATIENT
Start: 2021-10-18 | End: 2021-10-19

## 2021-10-18 RX ORDER — DIPHENHYDRAMINE HCL 25 MG
25 CAPSULE ORAL EVERY 6 HOURS PRN
Status: DISCONTINUED | OUTPATIENT
Start: 2021-10-18 | End: 2021-10-19 | Stop reason: HOSPADM

## 2021-10-18 RX ADMIN — DOCUSATE SODIUM 50 MG AND SENNOSIDES 8.6 MG 1 TABLET: 8.6; 5 TABLET, FILM COATED ORAL at 20:56

## 2021-10-18 RX ADMIN — ACETAMINOPHEN 975 MG: 325 TABLET, FILM COATED ORAL at 20:57

## 2021-10-18 RX ADMIN — PROPOFOL 100 MCG/KG/MIN: 10 INJECTION, EMULSION INTRAVENOUS at 07:29

## 2021-10-18 RX ADMIN — ACETAMINOPHEN 975 MG: 325 TABLET, FILM COATED ORAL at 12:55

## 2021-10-18 RX ADMIN — TRANEXAMIC ACID 1 G: 10 INJECTION, SOLUTION INTRAVENOUS at 09:31

## 2021-10-18 RX ADMIN — ONDANSETRON HYDROCHLORIDE 4 MG: 2 INJECTION, SOLUTION INTRAVENOUS at 08:00

## 2021-10-18 RX ADMIN — SODIUM CHLORIDE, POTASSIUM CHLORIDE, SODIUM LACTATE AND CALCIUM CHLORIDE: 600; 310; 30; 20 INJECTION, SOLUTION INTRAVENOUS at 07:24

## 2021-10-18 RX ADMIN — SODIUM CHLORIDE, POTASSIUM CHLORIDE, SODIUM LACTATE AND CALCIUM CHLORIDE: 600; 310; 30; 20 INJECTION, SOLUTION INTRAVENOUS at 22:33

## 2021-10-18 RX ADMIN — OXYCODONE HYDROCHLORIDE 10 MG: 5 TABLET ORAL at 22:40

## 2021-10-18 RX ADMIN — HYDROMORPHONE HYDROCHLORIDE 0.4 MG: 0.2 INJECTION, SOLUTION INTRAMUSCULAR; INTRAVENOUS; SUBCUTANEOUS at 11:39

## 2021-10-18 RX ADMIN — FAMOTIDINE 20 MG: 20 TABLET, FILM COATED ORAL at 20:56

## 2021-10-18 RX ADMIN — BUPIVACAINE HYDROCHLORIDE IN DEXTROSE 1.6 ML: 7.5 INJECTION, SOLUTION SUBARACHNOID at 07:28

## 2021-10-18 RX ADMIN — FENTANYL CITRATE 50 MCG: 50 INJECTION, SOLUTION INTRAMUSCULAR; INTRAVENOUS at 10:05

## 2021-10-18 RX ADMIN — TRANEXAMIC ACID 1 G: 10 INJECTION, SOLUTION INTRAVENOUS at 07:25

## 2021-10-18 RX ADMIN — Medication 2 G: at 07:15

## 2021-10-18 RX ADMIN — FENTANYL CITRATE 50 MCG: 50 INJECTION, SOLUTION INTRAMUSCULAR; INTRAVENOUS at 10:29

## 2021-10-18 RX ADMIN — HYDROMORPHONE HYDROCHLORIDE 0.4 MG: 0.2 INJECTION, SOLUTION INTRAMUSCULAR; INTRAVENOUS; SUBCUTANEOUS at 10:32

## 2021-10-18 RX ADMIN — ONDANSETRON 4 MG: 2 INJECTION INTRAMUSCULAR; INTRAVENOUS at 09:59

## 2021-10-18 RX ADMIN — ONDANSETRON 4 MG: 2 INJECTION INTRAMUSCULAR; INTRAVENOUS at 16:38

## 2021-10-18 RX ADMIN — MIDAZOLAM 2 MG: 1 INJECTION INTRAMUSCULAR; INTRAVENOUS at 07:25

## 2021-10-18 RX ADMIN — HYDROXYZINE HYDROCHLORIDE 25 MG: 25 TABLET, FILM COATED ORAL at 22:40

## 2021-10-18 RX ADMIN — OXYCODONE HYDROCHLORIDE 10 MG: 5 TABLET ORAL at 18:51

## 2021-10-18 RX ADMIN — SODIUM CHLORIDE 1000 ML: 9 INJECTION, SOLUTION INTRAVENOUS at 12:54

## 2021-10-18 RX ADMIN — BUPIVACAINE HYDROCHLORIDE AND EPINEPHRINE BITARTRATE 20 ML: 5; .005 INJECTION, SOLUTION EPIDURAL; INTRACAUDAL; PERINEURAL at 07:11

## 2021-10-18 RX ADMIN — HYDROXYZINE HYDROCHLORIDE 25 MG: 25 TABLET, FILM COATED ORAL at 16:38

## 2021-10-18 RX ADMIN — CEFAZOLIN SODIUM 2 G: 2 INJECTION, SOLUTION INTRAVENOUS at 16:38

## 2021-10-18 RX ADMIN — OXYCODONE HYDROCHLORIDE 5 MG: 5 TABLET ORAL at 14:49

## 2021-10-18 ASSESSMENT — MIFFLIN-ST. JEOR: SCORE: 1797.44

## 2021-10-18 NOTE — CONSULTS
[facility]  Hospitalist Consult Note  Name: Michaela Garcia    MRN: [id number]  YOB: 1965    Age: 56 year old  Date of admission: 10/18/2021  Primary care provider: Do Stevenson     Requesting Physician:  Dr. Jewell  Reason for consult:  Post-operative medical management         Assessment and Plan:   Michaela Garcia is a 56 year old female with a history of ulcerative colitis reportedly in remission, osteoarthritis, EVA, hypertension, thyroid disease, seasonal asthma,  who was admitted for right total knee arthroplasty.    1. DJD s/p right total knee arthroplasty on 10/18/2021: The patient is doing well, currently has well controlled pain and is hemodynamically stable. Will defer diet, activity, DVT prophylaxis, and pain control to the primary team. Currently the patient is on Tylenol, atarax, oxycodone and Dilaudid. Continue physical and occupational therapy. Continue incentive spirometry and check hemoglobin to evaluate for surgical blood loss and potential need for transfusion.     2. EVA: Resume CPAP nightly.     3. Thyroid Disease: Per patient taking Amour thyroid 30 mg daily. Resume at discharge if returning home tomorrow, otherwise order to be given while admitted.     4. Ulcerative colitis: Does not appear decompensated.  Patient is not on maintenance therapy for this.    5. HTN: Mildly hypertensive, suspect secondary to pain and nausea.  Patient reports historically well-controlled blood pressures and is not currently taking any antihypertensive therapy. Monitor.  We will have hydralazine as needed available but I do not suspect she will need new blood pressure medications at discharge recommend she follow-up closely with primary care.    Code status: Full  Prophylaxis: Low-dose aspirin per surgical team  Disposition: Home once cleared by therapy    Thank you for the consultation, we will continue to follow along during the hospitalization. Please page with any  questions or concerns.         History of Present Illness:   Michaela Garcia is a 56 year old female who was hospitalized for right total knee arthroplasty. Pre-operative note was fully reviewed and recommendations acknowledged. Op note and anesthesia notes and flow sheets reviewed.     The patient had no complications related to the procedure and has had an unremarkable post-operative course to this point. Currently pain is adequately controlled. Nausea is improving, yet to trial PO. No vomiting, diarrhea or constipation. No fevers, chills, diaphoresis. No chest pain, palpitations, dyspnea. Voiding without difficulty. No excessive somnolence and patient is fully alert and oriented. The patient has no other complaints at this time.                  Past Medical History:     Past Medical History:   Diagnosis Date     Arthritis     both knees     Hashimoto's disease      History of blood transfusion 1999     Hyperglycemia      Obesity      Palpitations      Sleep apnea     CPAP     Ulcerative colitis (H)      Uncomplicated asthma     assoc with cats, dust and really cold weather             Past Surgical History:     Past Surgical History:   Procedure Laterality Date     CHOLECYSTECTOMY  2006     ENT SURGERY  1994    sinus with T&A     GYN SURGERY  1999    D&C               Social History:     Social History     Tobacco Use     Smoking status: Never Smoker     Smokeless tobacco: Never Used   Substance Use Topics     Alcohol use: Yes     Comment: maybe once a month              Family History:   Family history was fully reviewed and non-contributory in this case.          Allergies:     Allergies   Allergen Reactions     Cats      Dust Mites      Erythromycin Nausea             Medications:     Prior to Admission medications    Medication Sig Last Dose Taking? Auth Provider   acetaminophen (TYLENOL) 325 MG tablet Take 2 tablets (650 mg) by mouth every 4 hours as needed for other (mild pain)  Yes Jenaro Jewell  "MD Del   acetaminophen (TYLENOL) 500 MG tablet 2 tablets by Oral or Feeding Tube route as needed  Yes Reported, Patient   aspirin (ASA) 325 MG EC tablet Take 1 tablet (325 mg) by mouth 2 times daily  Yes Jenaro Jewell MD   hydrOXYzine (ATARAX) 25 MG tablet Take 1 tablet (25 mg) by mouth every 6 hours as needed for itching or anxiety (with pain, moderate pain)  Yes Jenaro Jewell MD   IBUPROFEN PO Take by mouth every 4 hours as needed for moderate pain  Yes Reported, Patient   Naproxen Sod-Diphenhydramine (ALEVE PM PO) Take by mouth At Bedtime   Yes Reported, Patient   oxyCODONE (ROXICODONE) 5 MG tablet Take 1-2 tablets (5-10 mg) by mouth every 3 hours as needed for pain (Moderate to Severe)  Yes Jenaro Jewell MD   senna-docusate (SENOKOT-S/PERICOLACE) 8.6-50 MG tablet Take 1-2 tablets by mouth 2 times daily Take while on oral narcotics to prevent or treat constipation.  Yes Jenaro Jewell MD   thyroid (ARMOUR) 30 MG tablet Take 30 mg by mouth daily 10/17/2021 at 0630 Yes Reported, Patient   VITAMIN D, CHOLECALCIFEROL, PO Take 50,000 Units by mouth once a week 10/9/2021 Yes Reported, Patient       Current hospital administered medication list (MAR) also reviewed.          Review of Systems:     A comprehensive greater than 10 system review of systems was carried out.  Pertinent positives and negatives are noted above.  Otherwise negative for contributory info.            Physical Exam:   Blood pressure (!) 148/79, pulse 67, temperature 98.4  F (36.9  C), temperature source Temporal, resp. rate 18, height 1.778 m (5' 10\"), weight 112.7 kg (248 lb 8 oz), SpO2 93 %.  Exam:  General: Alert, awake, no acute distress.  HEENT: Normocephalic and atraumatic, eyes anicteric and without scleral injection, EOMI, face symmetric, MMM.  Cardiac: RRR, normal S1, S2. No m/g/r, no LE edema.  Pulmonary: Normal chest rise, normal work of breathing.  Lungs CTAB without crackles or " wheezing.  Abdomen: soft, non-tender, non-distended.  Normoactive bowel sounds, no guarding or rebound tenderness.  Extremities: no deformities.  Warm, well perfused.  Skin: no rashes or lesions.  Warm and Dry.  Neuro: No focal deficits.  Speech clear.  Spontaneously moving all extremities in bed.  Psych: Alert and oriented x3. Appropriate affect.          Data:   Imaging:  None Reviewed.    EKG/Telemetry:  None Reviewed.    Labs: Reviewed.   Recent Labs   Lab 10/18/21  1233   CR 0.67   GFRESTIMATED >90     No results for input(s): HGB in the last 168 hours.    Treasure Tejada PA-C  [facility] Hospitalist  October 18, 2021

## 2021-10-18 NOTE — PROGRESS NOTES
10/18/21 1600   Quick Adds   Type of Visit Initial PT Evaluation   Living Environment   Living Environment Comments Pt lives in [address] [facility]. 9 stairs inside to manage with L railing.  1 [place] without railing. Pt lives with 4 children, able to assist with recover. Has been using crutch on stairs.    Self-Care   Usual Activity Tolerance moderate   Current Activity Tolerance moderate   Equipment Currently Used at Home crutches;walker, rolling   Disability/Function   Fall history within last six months no   General Information   Onset of Illness/Injury or Date of Surgery 10/18/21   Referring Physician Jenaro Jewell MD   Patient/Family Therapy Goals Statement (PT) To improve mobility, decrease pain   Pertinent History of Current Problem (include personal factors and/or comorbidities that impact the POC) Pt is a 56 year old female POD0 s/p R TKA.   Existing Precautions/Restrictions weight bearing   Weight-Bearing Status - LLE full weight-bearing   Weight-Bearing Status - RLE weight-bearing as tolerated   Cognition   Orientation Status (Cognition) oriented x 4   Affect/Mental Status (Cognition) WFL   Follows Commands (Cognition) WFL   Pain Assessment   Patient Currently in Pain Yes, see Vital Sign flowsheet  (5/10 R knee)   Range of Motion (ROM)   ROM Quick Adds ROM deficits secondary to surgical procedure   ROM Comment R knee flexion ~30 degrees   Strength   Strength Comments able to complete B SLR   Bed Mobility   Comment (Bed Mobility) CGA supine <> sit   Transfers   Transfer Safety Comments CGA sit <> stand with FWW   Gait/Stairs (Locomotion)   Distance in Feet (Required for LE Total Joints) 6   Pattern (Gait) step-to   Deviations/Abnormal Patterns (Gait) base of support, wide;gait speed decreased;weight shifting decreased   Comment (Gait/Stairs) CGA with FWW   Balance   Balance Comments fair+ standing with FWW; normal sitting   Clinical Impression   Criteria for Skilled Therapeutic Intervention yes,  treatment indicated   PT Diagnosis (PT) impaired functional mobility   Influenced by the following impairments decreased R knee ROM; pain   Functional limitations due to impairments impaired bed mobility, transfers, ambulation, stairs   Clinical Presentation Stable/Uncomplicated   Clinical Presentation Rationale Pt is medically stable   Clinical Decision Making (Complexity) low complexity   Therapy Frequency (PT) Daily   Predicted Duration of Therapy Intervention (days/wks) 2 days   Planned Therapy Interventions (PT) balance training;bed mobility training;gait training;home exercise program;ROM (range of motion);patient/family education;strengthening;stair training;transfer training   Anticipated Equipment Needs at Discharge (PT) crutches, axillary;walker, rolling   Risk & Benefits of therapy have been explained evaluation/treatment results reviewed;care plan/treatment goals reviewed;risks/benefits reviewed;current/potential barriers reviewed;participants voiced agreement with care plan;participants included;patient   PT Discharge Planning    PT Discharge Recommendation (DC Rec)   (defer to ortho)   PT Rationale for DC Rec Anticipate that patient will be at least SBA for transfers, ambulation; Lizzette with stairs, bed mobility at discharge.   Total Evaluation Time   Total Evaluation Time (Minutes) 5

## 2021-10-18 NOTE — ANESTHESIA PROCEDURE NOTES
Intrathecal injection Procedure Note    Pre-Procedure   Staff -        Anesthesiologist:  Gifty Jennings MD       Performed By: anesthesiologist       Location: OR       Procedure Start/Stop Times: 10/18/2021 7:28 AM       Pre-Anesthestic Checklist: patient identified, IV checked, risks and benefits discussed, informed consent, monitors and equipment checked and pre-op evaluation  Timeout:       Correct Patient: Yes        Correct Procedure: Yes        Correct Site: Yes        Correct Position: Yes   Procedure Documentation  Procedure: intrathecal injection       Patient Position: sitting       Skin prep: Chloraprep       Insertion Site: L4-5, L3-4. (midline approach).       Needle Gauge: 24.        Needle Length (Inches): 4        Spinal Needle Type: Pencan       Introducer used       Introducer: 20 G       # of attempts: 1 and  # of redirects:  0    Assessment/Narrative         Paresthesias: No.       Sensory Level: T6       CSF fluid: clear.      Opening pressure was cmH2O while  Sitting.      Medication(s) Administered   0.75% Hyperbaric Bupivacaine (Intrathecal), 1.6 mL  Medication Administration Time: 10/18/2021 7:28 AM

## 2021-10-18 NOTE — ANESTHESIA PROCEDURE NOTES
Adductor canal Procedure Note    Pre-Procedure   Staff -        Anesthesiologist:  Gifty Jennings MD       Performed By: anesthesiologist       Location: pre-op       Procedure Start/Stop Times: 10/18/2021 7:08 AM and 10/18/2021 7:13 AM       Pre-Anesthestic Checklist: patient identified, IV checked, site marked, risks and benefits discussed, informed consent, monitors and equipment checked, pre-op evaluation, at physician/surgeon's request and post-op pain management  Timeout:       Correct Patient: Yes        Correct Procedure: Yes        Correct Site: Yes        Correct Position: Yes        Correct Laterality: Yes        Site Marked: Yes  Procedure Documentation  Procedure: Adductor canal       Laterality: right       Patient Position: supine       Skin prep: Betadine       Needle Type: short bevel       Needle Gauge: 21.        Needle Length (Inches): 4        Ultrasound guided       1. Ultrasound was used to identify targeted nerve, plexus, vascular marker, or fascial plane and place a needle adjacent to it in real-time.       2. Ultrasound was used to visualize the spread of anesthetic in close proximity to the above referenced structure.       4. The visualized anatomic structures appeared normal.       5. There were no apparent abnormal pathologic findings.    Assessment/Narrative         The placement was negative for: blood aspirated, painful injection and site bleeding       Paresthesias: No.     Bolus given via needle..        Secured via.        Insertion/Infusion Method: Single Shot       Complications: none       Injection made incrementally with aspirations every 5 mL.    Medication(s) Administered   Bupivacaine 0.5% w/ 1:200K Epi (Other), 20 mL  Medication Administration Time: 10/18/2021 7:11 AM     Comments:  Block requested by surgeon for postop pain, as I am uniquely qualified to perform procedure.

## 2021-10-18 NOTE — OP NOTE
Procedure Date: 10/18/2021    PREOPERATIVE DIAGNOSIS:  Right knee primary varus osteoarthritis.    POSTOPERATIVE DIAGNOSIS:  Right knee primary varus osteoarthritis.    PROCEDURE:  Right total knee arthroplasty using an Arthrex iBalance knee system.    SURGEON:  Jenaro Jewell M.D.    FIRST ASSISTANT:  Sean Daniels PA-C.    INDICATIONS FOR PROCEDURE:  Ms. Garcia is a very pleasant 56-year-old female who has had ongoing right knee pain secondary to varus primary osteoarthritis.  She has a 10-degree flexion contracture.  We discussed treatment options.  I recommended right total knee arthroplasty.  She understands these and wished to proceed with surgery.    NARRATIVE EVENTS:  After thorough evaluation and proper identification, the patient to be operated on, Ms. Garcia was taken to the operating room where she underwent a spinal anesthetic as well as femoral nerve block, placed supine on the operating table and her right leg was prepped and draped in the usual sterile manner.  After appropriate surgical pause confirming the patient to be operated on, 10 minutes after she received 2 grams of Ancef, her right leg was exsanguinated and tourniquet was raised to 300 mmHg on the right upper thigh.  We approached the right knee through a midline incision centered over the patella.  Skin and soft tissue sharply dissected down to the patella where a median parapatellar arthrotomy was performed.  We performed a moderate medial release according to our preoperative templating, everted the patella, removed the patellar fat pad, flexed the knee, removed the osteophytes where distal femur drilled and placed 300 mm intramedullary guide for distal femoral resection.  We resected the distal femur at 5 degrees physiologic valgus to the femur, resecting 12 mm of distal femoral bone secondary to her 10-degree flexion contracture.  Once this was done, we then sized the distal femur sized to fit a size 6 Arthrex iBalance  femoral component.  We pinned our 4-in-1 cutting block, 3 degrees, external rotation to the posterior condyles, in line with the epicondylar axis perpendicular to Whitesides line and made our anterior, posterior and chamfer resections.  We then proceeded to the tibia.  We resected this perpendicular to long axis of the tibia with extramedullary guides.  Once this was done, we then removed the excess soft tissue from the knee, mainly both cruciate ligaments and both meniscus.  We then made our box cut for our posterior stabilized femoral component.  Placed trial implants into position, a size 6 femur, 6 tibia and a 9 mm thick PS polyethylene insert.  This gave me full range of motion and appropriate stability.  We all felt the patella tracked nicely.  We marked this point, we paid our attention then to the patella, which measured 23 mm prior to resection.  We resected this down to 14 mm and placed a 9 mm thick x 34 mm diameter round tri-peg into position.  With patella and button in position, this measured 23.5 mm and tracked quite nicely.  At this point, we marked our tibial rotation, we punched for tibial keel, thoroughly irrigated the knee, prepared to cement in our final components.  Using one batch of Simplex SpeedSet cement, we cemented in a size 6 femur, 6 tibia and a 34 round tri-peg patellar button.  Once this had cured, we retrialed our polyethylene inserts, found that the  polyethylene 9 mm PS insert gave us the best stability in full range of motion.  This was then impacted into position after removing all the excess cement from the knee, thoroughly irrigated the knee with a dilute Betadine solution followed by saline.  Once the polyethylene was locked into position.  We thoroughly irrigated the knee one final time with saline.  Closed the arthrotomy with  #1 Vicryl and a running #1 Stratafix suture, placed 1 gram of powdered vancomycin deep to the arthrotomy.  We closed the skin and soft tissue with  absorbable suture and staples in the skin.  The patient was placed in a well-padded postop dressing, taken to recovery room in stable condition.  She tolerated the procedure without difficulty.    Jenaro Jewell MD        D: 10/18/2021   T: 10/18/2021   MT: WAYNE    Name:     DENIS CHAPMAN  MRN:      [id number]        Account:        [id number]   :      1965           Procedure Date: 10/18/2021     Document: [id number]

## 2021-10-18 NOTE — ANESTHESIA CARE TRANSFER NOTE
Patient: Michaela Garcia    Procedure: Procedure(s):  Right total knee arthroplasty       Diagnosis: DJD (degenerative joint disease) [M19.90]  Diagnosis Additional Information: No value filed.    Anesthesia Type:   Spinal     Note:    Oropharynx: oropharynx clear of all foreign objects and spontaneously breathing  Level of Consciousness: awake  Oxygen Supplementation: room air    Independent Airway: airway patency satisfactory and stable  Dentition: dentition unchanged  Vital Signs Stable: post-procedure vital signs reviewed and stable  Report to RN Given: handoff report given  Patient transferred to: PACU    Handoff Report: Identifed the Patient, Identified the Reponsible Provider, Reviewed the pertinent medical history, Discussed the surgical course, Reviewed Intra-OP anesthesia mangement and issues during anesthesia, Set expectations for post-procedure period and Allowed opportunity for questions and acknowledgement of understanding      Vitals:  Vitals Value Taken Time   /68 10/18/21 0943   Temp     Pulse 79 10/18/21 0945   Resp 20 10/18/21 0945   SpO2     Vitals shown include unvalidated device data.    Electronically Signed By: LAMBERTO Hartley CRNA  October 18, 2021  9:47 AM

## 2021-10-18 NOTE — ANESTHESIA PREPROCEDURE EVALUATION
Anesthesia Pre-Procedure Evaluation    Patient: Michaela Garcia   MRN: [id number] : 1965        Preoperative Diagnosis: DJD (degenerative joint disease) [M19.90]    Procedure : Procedure(s):  Right total knee arthroplasty          Past Medical History:   Diagnosis Date     Arthritis     both knees     Hashimoto's disease      History of blood transfusion      Hyperglycemia      Obesity      Palpitations      Sleep apnea     CPAP     Ulcerative colitis (H)      Uncomplicated asthma     assoc with cats, dust and really cold weather      Past Surgical History:   Procedure Laterality Date     CHOLECYSTECTOMY  2006     ENT SURGERY      sinus with T&A     GYN SURGERY      D&C      Allergies   Allergen Reactions     Cats      Dust Mites      Erythromycin Nausea      Social History     Tobacco Use     Smoking status: Never Smoker     Smokeless tobacco: Never Used   Substance Use Topics     Alcohol use: Yes     Comment: maybe once a month       Wt Readings from Last 1 Encounters:   10/18/21 112.7 kg (248 lb 8 oz)        Anesthesia Evaluation            ROS/MED HX  ENT/Pulmonary:     (+) sleep apnea,     Neurologic:       Cardiovascular:  - neg cardiovascular ROS     METS/Exercise Tolerance:     Hematologic:       Musculoskeletal:       GI/Hepatic:       Renal/Genitourinary:       Endo:     (+) thyroid problem, Obesity (BMI 35),     Psychiatric/Substance Use:       Infectious Disease:       Malignancy:       Other:            Physical Exam    Airway        Mallampati: II   TM distance: > 3 FB   Neck ROM: full     Respiratory Devices and Support         Dental           Cardiovascular   cardiovascular exam normal          Pulmonary   pulmonary exam normal                OUTSIDE LABS:  CBC: No results found for: WBC, HGB, HCT, PLT  BMP:   Lab Results   Component Value Date     2018     2018    POTASSIUM 3.9 2018    POTASSIUM 4.2 2018    CHLORIDE 104 2018    CHLORIDE  102 07/04/2018    CO2 30 12/13/2018    CO2 21 07/04/2018    BUN 16 12/13/2018    BUN 14 07/04/2018    CR 0.72 12/13/2018    CR 0.78 07/04/2018     (H) 12/13/2018     (A) 07/04/2018     COAGS: No results found for: PTT, INR, FIBR  POC: No results found for: BGM, HCG, HCGS  HEPATIC:   Lab Results   Component Value Date    ALBUMIN 4.2 07/04/2018    PROTTOTAL 7.1 07/04/2018    ALT 18 07/04/2018    AST 21 07/04/2018    ALKPHOS 89 07/04/2018    BILITOTAL <0.2 07/04/2018     OTHER:   Lab Results   Component Value Date    MARGOTH 8.9 12/13/2018    T3 105 07/04/2018       Anesthesia Plan    ASA Status:  2      Anesthesia Type: Spinal.      Maintenance: TIVA.        Consents    Anesthesia Plan(s) and associated risks, benefits, and realistic alternatives discussed. Questions answered and patient/representative(s) expressed understanding.     - Discussed with:  Patient         Postoperative Care    Pain management: Peripheral nerve block (Single Shot), Multi-modal analgesia.   PONV prophylaxis: Ondansetron (or other 5HT-3), Dexamethasone or Solumedrol, Background Propofol Infusion     Comments:                Gifty Jennings MD

## 2021-10-18 NOTE — PLAN OF CARE
Patient vital signs are at baseline: Yes Hypertensive other VSS afebrile. 2L NC  Patient able to ambulate as they were prior to admission or with assist devices provided by therapies during their stay:  No,  Reason:  PT at 1600 but pt did get up to BSC to void   Patient MUST void prior to discharge:  Yes  Patient able to tolerate oral intake:  Yes  Pain has adequate pain control using Oral analgesics:  No,  Reason:  Working on pain management. Pt had scheduled Tylenol and has prn Oxycodone and IV Dialudid available.     Pt arrived 1210 to floor. EBL 5 ML  pt had spinal. Dressing c/d/i. Pt said she can feel up to her upper thighs with no nb/t. LS clear. Very hypoactive BS. Nausea down in pacu with IV Zofran given. Plan is discharge tomorrow home with daughter Marti. Discharge meds locked in lock box.

## 2021-10-18 NOTE — BRIEF OP NOTE
[facility]    Brief Operative Note    Pre-operative diagnosis: DJD (degenerative joint disease) [M19.90]  Post-operative diagnosis Same as pre-operative diagnosis    Procedure: Procedure(s):  Right total knee arthroplasty  Surgeon: Surgeon(s) and Role:     * Jenaro Jewell MD - Primary     * Daisy Moncada PA-C - Assisting  Anesthesia: Spinal   Estimated Blood Loss: Less than 10 ml    Drains: None  Specimens: * No specimens in log *  Findings:   None.  Complications: None.  Implants:   Implant Name Type Inv. Item Serial No.  Lot No. LRB No. Used Action   BONE CEMENT STRK SIMPLEX P SPEEDSET 6192-1-001 - [id number] Cement, Bone BONE CEMENT STRK SIMPLEX P SPEEDSET 6192-1-001  [employer] [id number] Right 1 Implanted   IMP COMP TKA IBALANCE MOD TIBIAL TRAY SZ 6 AR-513-T6 - [id number] Total Joint Component/Insert IMP COMP TKA IBALANCE MOD TIBIAL TRAY SZ 6 AR-513-T6  [employer] [id number] Right 1 Implanted   iBalance TKA, Femoral implant, PS, Cemented, BRIAN 6, Right    ARTH[employer]X [id number] Right 1 Implanted   PATELLA IMP DOME 34 X 9MM VIT-E - [id number] Total Joint Component/Insert PATELLA IMP DOME 34 X 9MM VIT-E  [employer] [id number] Right 1 Implanted   PS TIBIAL BEARING SZ 6 9MM VIT E - [id number] Total Joint Component/Insert PS TIBIAL BEARING SZ 6 9MM VIT E  [employer]X [id number] Right 1 Implanted

## 2021-10-18 NOTE — ANESTHESIA POSTPROCEDURE EVALUATION
Patient: Michaela Garcia    Procedure: Procedure(s):  Right total knee arthroplasty       Diagnosis:DJD (degenerative joint disease) [M19.90]  Diagnosis Additional Information: No value filed.    Anesthesia Type:  Spinal    Note:  Disposition: Admission   Postop Pain Control: Uneventful            Sign Out: Well controlled pain   PONV: No   Neuro/Psych: Uneventful            Sign Out: Acceptable/Baseline neuro status   Airway/Respiratory: Uneventful            Sign Out: Acceptable/Baseline resp. status   CV/Hemodynamics: Uneventful            Sign Out: Acceptable CV status; No obvious hypovolemia; No obvious fluid overload   Other NRE: NONE   DID A NON-ROUTINE EVENT OCCUR? No           Last vitals:  Vitals Value Taken Time   /74 10/18/21 1150   Temp 96.6  F (35.9  C) 10/18/21 0945   Pulse 65 10/18/21 1155   Resp 14 10/18/21 1155   SpO2 98 % 10/18/21 1155       Electronically Signed By: Gifty Jennings MD  October 18, 2021  2:19 PM

## 2021-10-19 ENCOUNTER — APPOINTMENT (OUTPATIENT)
Dept: PHYSICAL THERAPY | Facility: CLINIC | Age: 56
End: 2021-10-19
Attending: ORTHOPAEDIC SURGERY
Payer: COMMERCIAL

## 2021-10-19 ENCOUNTER — APPOINTMENT (OUTPATIENT)
Dept: OCCUPATIONAL THERAPY | Facility: CLINIC | Age: 56
End: 2021-10-19
Attending: ORTHOPAEDIC SURGERY
Payer: COMMERCIAL

## 2021-10-19 VITALS
RESPIRATION RATE: 21 BRPM | DIASTOLIC BLOOD PRESSURE: 67 MMHG | HEIGHT: 70 IN | SYSTOLIC BLOOD PRESSURE: 163 MMHG | TEMPERATURE: 97.8 F | OXYGEN SATURATION: 94 % | HEART RATE: 72 BPM | BODY MASS INDEX: 35.58 KG/M2 | WEIGHT: 248.5 LBS

## 2021-10-19 LAB — HGB BLD-MCNC: 12.7 G/DL (ref 11.7–15.7)

## 2021-10-19 PROCEDURE — 97530 THERAPEUTIC ACTIVITIES: CPT | Mod: GP | Performed by: PHYSICAL THERAPIST

## 2021-10-19 PROCEDURE — 97535 SELF CARE MNGMENT TRAINING: CPT | Mod: GO | Performed by: REHABILITATION PRACTITIONER

## 2021-10-19 PROCEDURE — 36415 COLL VENOUS BLD VENIPUNCTURE: CPT | Performed by: ORTHOPAEDIC SURGERY

## 2021-10-19 PROCEDURE — 96372 THER/PROPH/DIAG INJ SC/IM: CPT | Performed by: ORTHOPAEDIC SURGERY

## 2021-10-19 PROCEDURE — 97165 OT EVAL LOW COMPLEX 30 MIN: CPT | Mod: GO | Performed by: REHABILITATION PRACTITIONER

## 2021-10-19 PROCEDURE — 250N000013 HC RX MED GY IP 250 OP 250 PS 637: Performed by: ORTHOPAEDIC SURGERY

## 2021-10-19 PROCEDURE — 250N000011 HC RX IP 250 OP 636: Performed by: ORTHOPAEDIC SURGERY

## 2021-10-19 PROCEDURE — 85018 HEMOGLOBIN: CPT | Performed by: ORTHOPAEDIC SURGERY

## 2021-10-19 PROCEDURE — 97116 GAIT TRAINING THERAPY: CPT | Mod: GP | Performed by: PHYSICAL THERAPIST

## 2021-10-19 PROCEDURE — 250N000013 HC RX MED GY IP 250 OP 250 PS 637: Performed by: PHYSICIAN ASSISTANT

## 2021-10-19 PROCEDURE — 97110 THERAPEUTIC EXERCISES: CPT | Mod: GP | Performed by: PHYSICAL THERAPIST

## 2021-10-19 RX ADMIN — ENOXAPARIN SODIUM 40 MG: 40 INJECTION SUBCUTANEOUS at 07:58

## 2021-10-19 RX ADMIN — OXYCODONE HYDROCHLORIDE 10 MG: 5 TABLET ORAL at 07:54

## 2021-10-19 RX ADMIN — CEFAZOLIN SODIUM 2 G: 2 INJECTION, SOLUTION INTRAVENOUS at 00:57

## 2021-10-19 RX ADMIN — HYDROXYZINE HYDROCHLORIDE 25 MG: 25 TABLET, FILM COATED ORAL at 10:08

## 2021-10-19 RX ADMIN — OXYCODONE HYDROCHLORIDE 10 MG: 5 TABLET ORAL at 12:04

## 2021-10-19 RX ADMIN — HYDROMORPHONE HYDROCHLORIDE 0.2 MG: 0.2 INJECTION, SOLUTION INTRAMUSCULAR; INTRAVENOUS; SUBCUTANEOUS at 01:01

## 2021-10-19 RX ADMIN — ONDANSETRON 4 MG: 4 TABLET, ORALLY DISINTEGRATING ORAL at 01:13

## 2021-10-19 RX ADMIN — OXYCODONE HYDROCHLORIDE 10 MG: 5 TABLET ORAL at 02:43

## 2021-10-19 RX ADMIN — FAMOTIDINE 20 MG: 20 TABLET, FILM COATED ORAL at 07:52

## 2021-10-19 RX ADMIN — DOCUSATE SODIUM 50 MG AND SENNOSIDES 8.6 MG 1 TABLET: 8.6; 5 TABLET, FILM COATED ORAL at 07:52

## 2021-10-19 RX ADMIN — ACETAMINOPHEN 975 MG: 325 TABLET, FILM COATED ORAL at 12:04

## 2021-10-19 RX ADMIN — POLYETHYLENE GLYCOL 3350 17 G: 17 POWDER, FOR SOLUTION ORAL at 07:51

## 2021-10-19 RX ADMIN — LEVOTHYROXINE, LIOTHYRONINE 30 MG: 19; 4.5 TABLET ORAL at 07:52

## 2021-10-19 RX ADMIN — ACETAMINOPHEN 975 MG: 325 TABLET, FILM COATED ORAL at 04:36

## 2021-10-19 NOTE — PROGRESS NOTES
"Orthopedic Surgery  10/19/2021    S: Patient voices no complaints today.     O: Blood pressure (!) 167/70, pulse 65, temperature 98.4  F (36.9  C), temperature source Temporal, resp. rate 20, height 1.778 m (5' 10\"), weight 112.7 kg (248 lb 8 oz), SpO2 92 %.  Lab Results   Component Value Date    HGB 12.7 10/19/2021     No results found for: INR     Neurovascularly intact.  Calves are negative bilaterally, both soft and nontender.  The wound is C/D/I.  The wound looks good with minimal erythema of the surrounding skin.    A: Ms. Garcia is doing well status post Procedure(s):  Right total knee arthroplasty.    P: Continue physical therapy.   Anticoagulation on lovenox, home on ASA  Pain management  Discharge planning, home today    Jenaro Jewell MD  [phone]    "

## 2021-10-19 NOTE — PLAN OF CARE
Patient vital signs are at baseline: Yes, on capnography 2 lpm nasal cannula, sats 90's.  Patient able to ambulate as they were prior to admission or with assist devices provided by therapies during their stay:  Yes, ambulated in hallway and  to bathroom with sba of 1.  Patient MUST void prior to discharge:  Yes, voiding in good amounts  Patient able to tolerate oral intake:  Yes, tolerated regular diet, no nausea.  Pain has adequate pain control using Oral analgesics:  Yes, pain controlled with tylenol, vistaril and oxycodone.  Care plan reviewed with pt .

## 2021-10-19 NOTE — PLAN OF CARE
Occupational Therapy Discharge Summary    Reason for therapy discharge:    All goals and outcomes met, no further needs identified.    Progress towards therapy goal(s). See goals on Care Plan in [facility] electronic health record for goal details.  Goals met    Therapy recommendation(s):    Defer to Ortho team.

## 2021-10-19 NOTE — PLAN OF CARE
Patient vital signs are at baseline: Yes on 2L O2  Patient able to ambulate as they were prior to admission or with assist devices provided by therapies during their stay:  Yes w/ Ax1 w/ GBW  Patient MUST void prior to discharge:  Yes  Patient able to tolerate oral intake:  Yes on regular diet  Pain has adequate pain control using Oral analgesics:  Yes with 10 mg oxycodone, 25 mg atarax, and scheduled tylenol and 0.2 of dilaudid.    Pt Ox4. Dressing CDI. Plan to discharge home today with .

## 2021-10-19 NOTE — PROGRESS NOTES
10/19/21 0800   Quick Adds   Type of Visit Initial Occupational Therapy Evaluation   Living Environment   People in home child(raysa), adult  (ages 15-23, 2 oldest home from college)   Home Accessibility stairs to enter home;stairs within home   Number of Stairs, Main Entrance 1   Number of Stairs, Within Home, Primary 9  (split level 9 both ways)   Transportation Anticipated family or friend will provide   Living Environment Comments Pt lives in 4 level [facility], plans on staying upstairs in bedroom with bathroom on same flloor. family to help naviagte stairs or bring meals up. will have someone at home to A as needed throughout day. Tub shower and commode to go over standard height toilet.    Self-Care   Usual Activity Tolerance moderate   Current Activity Tolerance moderate   Equipment Currently Used at Home crutches;walker, rolling;shower chair  (reacher, dressing stick, sock aid, long-handled sponge)   Activity/Exercise/Self-Care Comment Reports I in ADLS at baseline   Instrumental Activities of Daily Living (IADL)   IADL Comments Children to A with IADLs, 1 x a week housecleaning service   General Information   Onset of Illness/Injury or Date of Surgery 10/18/21   Referring Physician Jenaro Jewell MD   Patient/Family Therapy Goal Statement (OT) To go home with A   Additional Occupational Profile Info/Pertinent History of Current Problem POD #1 R TKA   Performance Patterns (Routines, Roles, Habits) Pt is a 55 yo female, who was I in all ADLs and IADLs at baseline. Pt has 4 kids who are able to help throughout the day as needed.    Existing Precautions/Restrictions fall   Right Lower Extremity (Weight-bearing Status) weight-bearing as tolerated (WBAT)   Cognitive Status Examination   Orientation Status orientation to person, place and time   Follows Commands WNL   Sensory   Sensory Comments Reports none   Pain Assessment   Patient Currently in Pain Yes, see Vital Sign flowsheet  (7/10)   Posture    Posture forward head position   Range of Motion Comprehensive   General Range of Motion no range of motion deficits identified   Strength Comprehensive (MMT)   General Manual Muscle Testing (MMT) Assessment no strength deficits identified   Muscle Tone Assessment   Muscle Tone Quick Adds No deficits were identified   Bed Mobility   Bed Mobility supine-sit   Supine-Sit Garrett (Bed Mobility) supervision;minimum assist (75% patient effort)   Transfers   Transfers sit-stand transfer;toilet transfer;shower transfer   Sit-Stand Transfer   Sit-Stand Garrett (Transfers) contact guard   Assistive Device (Sit-Stand Transfers) walker, front-wheeled   Shower Transfer   Type (Shower Transfer) sit-stand;stand-sit   Garrett Level (Shower Transfer) contact guard;minimum assist (75% patient effort)   Assistive Device (Shower Transfer) walker, front-wheeled   Toilet Transfer   Type (Toilet Transfer) sit-stand;stand-sit   Garrett Level (Toilet Transfer) contact guard   Assistive Device (Toilet Transfer) walker, front-wheeled   Balance   Balance Comments Pt uses FWW for increased balance through BUE   Clinical Impression   Criteria for Skilled Therapeutic Interventions Met (OT) yes;meets criteria   OT Diagnosis Decreased ADLs, IADLs and functioal/community mobility   OT Problem List-Impairments impacting ADL problems related to;activity tolerance impaired;mobility;range of motion (ROM);pain   Assessment of Occupational Performance 5 or more Performance Deficits   Identified Performance Deficits toileting, showering, dressing, home mgmt, meal prep, mobility   Planned Therapy Interventions (OT) ADL retraining;ROM;transfer training;progressive activity/exercise   Clinical Decision Making Complexity (OT) low complexity   Therapy Frequency (OT) 1x eval and treat   Risk & Benefits of therapy have been explained evaluation/treatment results reviewed;care plan/treatment goals reviewed;participants included;patient   OT  Discharge Planning    OT Rationale for DC Rec Defer to ortho team. Pt has met all OT goals for safe d/c home with A as needed   Total Evaluation Time (Minutes)   Total Evaluation Time (Minutes) 8

## 2021-10-19 NOTE — PLAN OF CARE
Physical Therapy Discharge Summary    Reason for therapy discharge:    Discharged to home with outpatient therapy.    Progress towards therapy goal(s). See goals on Care Plan in [facility] electronic health record for goal details.  Goals partially met.  Barriers to achieving goals:   limited tolerance for therapy.  Pt did not fully meet goals for bed mobility, needing Mod A at R LE and for gait distance due to R LE pain and decreased activity tolerance  Therapy recommendation(s):    Continued therapy is recommended.  Rationale/Recommendations:   .OP PT to increase R LE ROM and strength and progress ind with all mobility.

## 2021-11-14 ENCOUNTER — HEALTH MAINTENANCE LETTER (OUTPATIENT)
Age: 56
End: 2021-11-14

## 2022-01-09 ENCOUNTER — HEALTH MAINTENANCE LETTER (OUTPATIENT)
Age: 57
End: 2022-01-09

## 2022-03-23 ENCOUNTER — HOSPITAL ENCOUNTER (OUTPATIENT)
Facility: CLINIC | Age: 57
Discharge: HOME | End: 2022-03-24
Attending: EMERGENCY MEDICINE | Admitting: SURGERY
Payer: COMMERCIAL

## 2022-03-23 ENCOUNTER — ANESTHESIA EVENT (OUTPATIENT)
Dept: SURGERY | Facility: CLINIC | Age: 57
End: 2022-03-23
Payer: COMMERCIAL

## 2022-03-23 ENCOUNTER — APPOINTMENT (OUTPATIENT)
Dept: CT IMAGING | Facility: CLINIC | Age: 57
End: 2022-03-23
Attending: EMERGENCY MEDICINE
Payer: COMMERCIAL

## 2022-03-23 ENCOUNTER — ANESTHESIA (OUTPATIENT)
Dept: SURGERY | Facility: CLINIC | Age: 57
End: 2022-03-23
Payer: COMMERCIAL

## 2022-03-23 DIAGNOSIS — K56.609 SMALL BOWEL OBSTRUCTION (H): ICD-10-CM

## 2022-03-23 DIAGNOSIS — K42.0 INCARCERATED UMBILICAL HERNIA: ICD-10-CM

## 2022-03-23 DIAGNOSIS — K43.0 INCISIONAL HERNIA WITH OBSTRUCTION BUT NO GANGRENE: Primary | ICD-10-CM

## 2022-03-23 LAB
ALBUMIN UR-MCNC: NEGATIVE MG/DL
ANION GAP SERPL CALCULATED.3IONS-SCNC: 3 MMOL/L (ref 3–14)
APPEARANCE UR: CLEAR
BASOPHILS # BLD AUTO: 0 10E3/UL (ref 0–0.2)
BASOPHILS NFR BLD AUTO: 0 %
BILIRUB UR QL STRIP: NEGATIVE
BUN SERPL-MCNC: 13 MG/DL (ref 7–30)
CALCIUM SERPL-MCNC: 9.6 MG/DL (ref 8.5–10.1)
CHLORIDE BLD-SCNC: 107 MMOL/L (ref 94–109)
CO2 SERPL-SCNC: 29 MMOL/L (ref 20–32)
COLOR UR AUTO: ABNORMAL
CREAT SERPL-MCNC: 0.62 MG/DL (ref 0.52–1.04)
EOSINOPHIL # BLD AUTO: 0.1 10E3/UL (ref 0–0.7)
EOSINOPHIL NFR BLD AUTO: 1 %
ERYTHROCYTE [DISTWIDTH] IN BLOOD BY AUTOMATED COUNT: 13.8 % (ref 10–15)
GFR SERPL CREATININE-BSD FRML MDRD: >90 ML/MIN/1.73M2
GLUCOSE BLD-MCNC: 119 MG/DL (ref 70–99)
GLUCOSE UR STRIP-MCNC: NEGATIVE MG/DL
HCT VFR BLD AUTO: 45.8 % (ref 35–47)
HGB BLD-MCNC: 14.5 G/DL (ref 11.7–15.7)
HGB UR QL STRIP: NEGATIVE
HOLD SPECIMEN: NORMAL
IMM GRANULOCYTES # BLD: 0 10E3/UL
IMM GRANULOCYTES NFR BLD: 0 %
KETONES UR STRIP-MCNC: ABNORMAL MG/DL
LEUKOCYTE ESTERASE UR QL STRIP: NEGATIVE
LYMPHOCYTES # BLD AUTO: 2 10E3/UL (ref 0.8–5.3)
LYMPHOCYTES NFR BLD AUTO: 20 %
MCH RBC QN AUTO: 29.7 PG (ref 26.5–33)
MCHC RBC AUTO-ENTMCNC: 31.7 G/DL (ref 31.5–36.5)
MCV RBC AUTO: 94 FL (ref 78–100)
MONOCYTES # BLD AUTO: 0.4 10E3/UL (ref 0–1.3)
MONOCYTES NFR BLD AUTO: 4 %
MUCOUS THREADS #/AREA URNS LPF: PRESENT /LPF
NEUTROPHILS # BLD AUTO: 7.4 10E3/UL (ref 1.6–8.3)
NEUTROPHILS NFR BLD AUTO: 75 %
NITRATE UR QL: NEGATIVE
NRBC # BLD AUTO: 0 10E3/UL
NRBC BLD AUTO-RTO: 0 /100
PH UR STRIP: 6 [PH] (ref 5–7)
PLATELET # BLD AUTO: 314 10E3/UL (ref 150–450)
POTASSIUM BLD-SCNC: 4.2 MMOL/L (ref 3.4–5.3)
RBC # BLD AUTO: 4.89 10E6/UL (ref 3.8–5.2)
RBC URINE: 1 /HPF
SARS-COV-2 RNA RESP QL NAA+PROBE: NEGATIVE
SODIUM SERPL-SCNC: 139 MMOL/L (ref 133–144)
SP GR UR STRIP: 1.02 (ref 1–1.03)
UROBILINOGEN UR STRIP-MCNC: NORMAL MG/DL
WBC # BLD AUTO: 10 10E3/UL (ref 4–11)
WBC URINE: 0 /HPF

## 2022-03-23 PROCEDURE — 96375 TX/PRO/DX INJ NEW DRUG ADDON: CPT

## 2022-03-23 PROCEDURE — 258N000003 HC RX IP 258 OP 636: Performed by: NURSE ANESTHETIST, CERTIFIED REGISTERED

## 2022-03-23 PROCEDURE — 250N000009 HC RX 250: Performed by: SURGERY

## 2022-03-23 PROCEDURE — 85014 HEMATOCRIT: CPT | Performed by: EMERGENCY MEDICINE

## 2022-03-23 PROCEDURE — 99285 EMERGENCY DEPT VISIT HI MDM: CPT | Mod: 25

## 2022-03-23 PROCEDURE — 250N000011 HC RX IP 250 OP 636: Performed by: EMERGENCY MEDICINE

## 2022-03-23 PROCEDURE — 81001 URINALYSIS AUTO W/SCOPE: CPT | Performed by: EMERGENCY MEDICINE

## 2022-03-23 PROCEDURE — 99204 OFFICE O/P NEW MOD 45 MIN: CPT | Mod: 57 | Performed by: SURGERY

## 2022-03-23 PROCEDURE — 360N000075 HC SURGERY LEVEL 2, PER MIN: Performed by: SURGERY

## 2022-03-23 PROCEDURE — 258N000003 HC RX IP 258 OP 636: Performed by: EMERGENCY MEDICINE

## 2022-03-23 PROCEDURE — 250N000011 HC RX IP 250 OP 636: Performed by: NURSE ANESTHETIST, CERTIFIED REGISTERED

## 2022-03-23 PROCEDURE — 36415 COLL VENOUS BLD VENIPUNCTURE: CPT | Performed by: EMERGENCY MEDICINE

## 2022-03-23 PROCEDURE — 250N000009 HC RX 250: Performed by: NURSE ANESTHETIST, CERTIFIED REGISTERED

## 2022-03-23 PROCEDURE — 272N000001 HC OR GENERAL SUPPLY STERILE: Performed by: SURGERY

## 2022-03-23 PROCEDURE — 74177 CT ABD & PELVIS W/CONTRAST: CPT

## 2022-03-23 PROCEDURE — 710N000009 HC RECOVERY PHASE 1, LEVEL 1, PER MIN: Performed by: SURGERY

## 2022-03-23 PROCEDURE — 87635 SARS-COV-2 COVID-19 AMP PRB: CPT | Performed by: SURGERY

## 2022-03-23 PROCEDURE — 96361 HYDRATE IV INFUSION ADD-ON: CPT | Mod: 59

## 2022-03-23 PROCEDURE — 250N000011 HC RX IP 250 OP 636: Performed by: SURGERY

## 2022-03-23 PROCEDURE — 250N000013 HC RX MED GY IP 250 OP 250 PS 637: Performed by: SURGERY

## 2022-03-23 PROCEDURE — 250N000011 HC RX IP 250 OP 636: Performed by: ANESTHESIOLOGY

## 2022-03-23 PROCEDURE — 80048 BASIC METABOLIC PNL TOTAL CA: CPT | Performed by: EMERGENCY MEDICINE

## 2022-03-23 PROCEDURE — 999N000141 HC STATISTIC PRE-PROCEDURE NURSING ASSESSMENT: Performed by: SURGERY

## 2022-03-23 PROCEDURE — C9803 HOPD COVID-19 SPEC COLLECT: HCPCS

## 2022-03-23 PROCEDURE — 88302 TISSUE EXAM BY PATHOLOGIST: CPT | Mod: TC | Performed by: SURGERY

## 2022-03-23 PROCEDURE — 49561 PR REPAIR INITIAL INCISIONAL HERNIA; INCARCERATED OR STRANGULATED: CPT | Mod: AS | Performed by: PHYSICIAN ASSISTANT

## 2022-03-23 PROCEDURE — 49561 PR REPAIR INITIAL INCISIONAL HERNIA; INCARCERATED OR STRANGULATED: CPT | Performed by: SURGERY

## 2022-03-23 PROCEDURE — 370N000017 HC ANESTHESIA TECHNICAL FEE, PER MIN: Performed by: SURGERY

## 2022-03-23 PROCEDURE — 96374 THER/PROPH/DIAG INJ IV PUSH: CPT | Mod: 59

## 2022-03-23 PROCEDURE — 258N000003 HC RX IP 258 OP 636: Performed by: SURGERY

## 2022-03-23 RX ORDER — OXYCODONE HYDROCHLORIDE 5 MG/1
5 TABLET ORAL
Status: DISCONTINUED | OUTPATIENT
Start: 2022-03-23 | End: 2022-03-23

## 2022-03-23 RX ORDER — HYDROMORPHONE HCL IN WATER/PF 6 MG/30 ML
0.2 PATIENT CONTROLLED ANALGESIA SYRINGE INTRAVENOUS
Status: DISCONTINUED | OUTPATIENT
Start: 2022-03-23 | End: 2022-03-24 | Stop reason: HOSPADM

## 2022-03-23 RX ORDER — NALOXONE HYDROCHLORIDE 0.4 MG/ML
0.2 INJECTION, SOLUTION INTRAMUSCULAR; INTRAVENOUS; SUBCUTANEOUS
Status: DISCONTINUED | OUTPATIENT
Start: 2022-03-23 | End: 2022-03-24 | Stop reason: HOSPADM

## 2022-03-23 RX ORDER — PROPOFOL 10 MG/ML
INJECTION, EMULSION INTRAVENOUS PRN
Status: DISCONTINUED | OUTPATIENT
Start: 2022-03-23 | End: 2022-03-23

## 2022-03-23 RX ORDER — FENTANYL CITRATE 50 UG/ML
50 INJECTION, SOLUTION INTRAMUSCULAR; INTRAVENOUS EVERY 5 MIN PRN
Status: DISCONTINUED | OUTPATIENT
Start: 2022-03-23 | End: 2022-03-23 | Stop reason: HOSPADM

## 2022-03-23 RX ORDER — CEFAZOLIN SODIUM/WATER 2 G/20 ML
2 SYRINGE (ML) INTRAVENOUS
Status: COMPLETED | OUTPATIENT
Start: 2022-03-23 | End: 2022-03-23

## 2022-03-23 RX ORDER — OXYCODONE HYDROCHLORIDE 5 MG/1
5 TABLET ORAL EVERY 4 HOURS PRN
Status: DISCONTINUED | OUTPATIENT
Start: 2022-03-23 | End: 2022-03-23 | Stop reason: HOSPADM

## 2022-03-23 RX ORDER — OXYCODONE HYDROCHLORIDE 5 MG/1
10 TABLET ORAL EVERY 4 HOURS PRN
Status: DISCONTINUED | OUTPATIENT
Start: 2022-03-23 | End: 2022-03-24 | Stop reason: HOSPADM

## 2022-03-23 RX ORDER — ALBUTEROL SULFATE 0.83 MG/ML
2.5 SOLUTION RESPIRATORY (INHALATION) EVERY 4 HOURS PRN
Status: DISCONTINUED | OUTPATIENT
Start: 2022-03-23 | End: 2022-03-23 | Stop reason: HOSPADM

## 2022-03-23 RX ORDER — MAGNESIUM HYDROXIDE 1200 MG/15ML
LIQUID ORAL PRN
Status: DISCONTINUED | OUTPATIENT
Start: 2022-03-23 | End: 2022-03-23 | Stop reason: HOSPADM

## 2022-03-23 RX ORDER — ONDANSETRON 2 MG/ML
4 INJECTION INTRAMUSCULAR; INTRAVENOUS EVERY 30 MIN PRN
Status: DISCONTINUED | OUTPATIENT
Start: 2022-03-23 | End: 2022-03-23

## 2022-03-23 RX ORDER — LABETALOL HYDROCHLORIDE 5 MG/ML
10 INJECTION, SOLUTION INTRAVENOUS
Status: DISCONTINUED | OUTPATIENT
Start: 2022-03-23 | End: 2022-03-23 | Stop reason: HOSPADM

## 2022-03-23 RX ORDER — PHENOL 1.4 %
10 AEROSOL, SPRAY (ML) MUCOUS MEMBRANE
COMMUNITY

## 2022-03-23 RX ORDER — LIDOCAINE HYDROCHLORIDE 10 MG/ML
INJECTION, SOLUTION INFILTRATION; PERINEURAL PRN
Status: DISCONTINUED | OUTPATIENT
Start: 2022-03-23 | End: 2022-03-23

## 2022-03-23 RX ORDER — FENTANYL CITRATE 50 UG/ML
INJECTION, SOLUTION INTRAMUSCULAR; INTRAVENOUS PRN
Status: DISCONTINUED | OUTPATIENT
Start: 2022-03-23 | End: 2022-03-23

## 2022-03-23 RX ORDER — FENTANYL CITRATE 50 UG/ML
50 INJECTION, SOLUTION INTRAMUSCULAR; INTRAVENOUS
Status: CANCELLED | OUTPATIENT
Start: 2022-03-23

## 2022-03-23 RX ORDER — OXYCODONE HYDROCHLORIDE 5 MG/1
5 TABLET ORAL EVERY 4 HOURS PRN
Status: DISCONTINUED | OUTPATIENT
Start: 2022-03-23 | End: 2022-03-24 | Stop reason: HOSPADM

## 2022-03-23 RX ORDER — NALOXONE HYDROCHLORIDE 0.4 MG/ML
0.4 INJECTION, SOLUTION INTRAMUSCULAR; INTRAVENOUS; SUBCUTANEOUS
Status: DISCONTINUED | OUTPATIENT
Start: 2022-03-23 | End: 2022-03-24 | Stop reason: HOSPADM

## 2022-03-23 RX ORDER — KETOROLAC TROMETHAMINE 15 MG/ML
15 INJECTION, SOLUTION INTRAMUSCULAR; INTRAVENOUS ONCE
Status: COMPLETED | OUTPATIENT
Start: 2022-03-23 | End: 2022-03-23

## 2022-03-23 RX ORDER — OXYCODONE HYDROCHLORIDE 5 MG/1
5-10 TABLET ORAL EVERY 4 HOURS PRN
Qty: 12 TABLET | Refills: 0 | Status: SHIPPED | OUTPATIENT
Start: 2022-03-23 | End: 2022-03-24

## 2022-03-23 RX ORDER — HYDRALAZINE HYDROCHLORIDE 20 MG/ML
2.5-5 INJECTION INTRAMUSCULAR; INTRAVENOUS EVERY 10 MIN PRN
Status: DISCONTINUED | OUTPATIENT
Start: 2022-03-23 | End: 2022-03-23 | Stop reason: HOSPADM

## 2022-03-23 RX ORDER — ONDANSETRON 2 MG/ML
4 INJECTION INTRAMUSCULAR; INTRAVENOUS EVERY 6 HOURS PRN
Status: DISCONTINUED | OUTPATIENT
Start: 2022-03-23 | End: 2022-03-24 | Stop reason: HOSPADM

## 2022-03-23 RX ORDER — SODIUM CHLORIDE 9 MG/ML
INJECTION, SOLUTION INTRAVENOUS CONTINUOUS
Status: DISCONTINUED | OUTPATIENT
Start: 2022-03-23 | End: 2022-03-24 | Stop reason: HOSPADM

## 2022-03-23 RX ORDER — HYDROMORPHONE HCL IN WATER/PF 6 MG/30 ML
0.4 PATIENT CONTROLLED ANALGESIA SYRINGE INTRAVENOUS EVERY 5 MIN PRN
Status: DISCONTINUED | OUTPATIENT
Start: 2022-03-23 | End: 2022-03-23 | Stop reason: HOSPADM

## 2022-03-23 RX ORDER — ONDANSETRON 4 MG/1
4 TABLET, ORALLY DISINTEGRATING ORAL EVERY 30 MIN PRN
Status: DISCONTINUED | OUTPATIENT
Start: 2022-03-23 | End: 2022-03-23 | Stop reason: HOSPADM

## 2022-03-23 RX ORDER — ONDANSETRON 2 MG/ML
INJECTION INTRAMUSCULAR; INTRAVENOUS PRN
Status: DISCONTINUED | OUTPATIENT
Start: 2022-03-23 | End: 2022-03-23

## 2022-03-23 RX ORDER — HYDROMORPHONE HCL IN WATER/PF 6 MG/30 ML
0.4 PATIENT CONTROLLED ANALGESIA SYRINGE INTRAVENOUS
Status: DISCONTINUED | OUTPATIENT
Start: 2022-03-23 | End: 2022-03-24 | Stop reason: HOSPADM

## 2022-03-23 RX ORDER — FENTANYL CITRATE 50 UG/ML
50 INJECTION, SOLUTION INTRAMUSCULAR; INTRAVENOUS ONCE
Status: COMPLETED | OUTPATIENT
Start: 2022-03-23 | End: 2022-03-23

## 2022-03-23 RX ORDER — ONDANSETRON 4 MG/1
4 TABLET, ORALLY DISINTEGRATING ORAL EVERY 6 HOURS PRN
Status: DISCONTINUED | OUTPATIENT
Start: 2022-03-23 | End: 2022-03-24 | Stop reason: HOSPADM

## 2022-03-23 RX ORDER — AMOXICILLIN 250 MG
1-2 CAPSULE ORAL 2 TIMES DAILY
Status: DISCONTINUED | OUTPATIENT
Start: 2022-03-23 | End: 2022-03-24

## 2022-03-23 RX ORDER — EPHEDRINE SULFATE 50 MG/ML
INJECTION, SOLUTION INTRAMUSCULAR; INTRAVENOUS; SUBCUTANEOUS PRN
Status: DISCONTINUED | OUTPATIENT
Start: 2022-03-23 | End: 2022-03-23

## 2022-03-23 RX ORDER — DEXAMETHASONE SODIUM PHOSPHATE 4 MG/ML
INJECTION, SOLUTION INTRA-ARTICULAR; INTRALESIONAL; INTRAMUSCULAR; INTRAVENOUS; SOFT TISSUE PRN
Status: DISCONTINUED | OUTPATIENT
Start: 2022-03-23 | End: 2022-03-23

## 2022-03-23 RX ORDER — ACETAMINOPHEN 325 MG/1
650 TABLET ORAL EVERY 4 HOURS PRN
Status: DISCONTINUED | OUTPATIENT
Start: 2022-03-23 | End: 2022-03-24 | Stop reason: HOSPADM

## 2022-03-23 RX ORDER — ACETAMINOPHEN 325 MG/1
650 TABLET ORAL EVERY 6 HOURS PRN
Status: DISCONTINUED | OUTPATIENT
Start: 2022-03-23 | End: 2022-03-23

## 2022-03-23 RX ORDER — SODIUM CHLORIDE, SODIUM LACTATE, POTASSIUM CHLORIDE, CALCIUM CHLORIDE 600; 310; 30; 20 MG/100ML; MG/100ML; MG/100ML; MG/100ML
INJECTION, SOLUTION INTRAVENOUS CONTINUOUS PRN
Status: DISCONTINUED | OUTPATIENT
Start: 2022-03-23 | End: 2022-03-23

## 2022-03-23 RX ORDER — ONDANSETRON 2 MG/ML
4 INJECTION INTRAMUSCULAR; INTRAVENOUS EVERY 30 MIN PRN
Status: DISCONTINUED | OUTPATIENT
Start: 2022-03-23 | End: 2022-03-23 | Stop reason: HOSPADM

## 2022-03-23 RX ORDER — THYROID 30 MG/1
60 TABLET ORAL DAILY
Status: DISCONTINUED | OUTPATIENT
Start: 2022-03-24 | End: 2022-03-24 | Stop reason: HOSPADM

## 2022-03-23 RX ORDER — SODIUM CHLORIDE, SODIUM LACTATE, POTASSIUM CHLORIDE, CALCIUM CHLORIDE 600; 310; 30; 20 MG/100ML; MG/100ML; MG/100ML; MG/100ML
INJECTION, SOLUTION INTRAVENOUS CONTINUOUS
Status: DISCONTINUED | OUTPATIENT
Start: 2022-03-23 | End: 2022-03-23 | Stop reason: HOSPADM

## 2022-03-23 RX ORDER — NEOSTIGMINE METHYLSULFATE 1 MG/ML
VIAL (ML) INJECTION PRN
Status: DISCONTINUED | OUTPATIENT
Start: 2022-03-23 | End: 2022-03-23

## 2022-03-23 RX ORDER — SODIUM CHLORIDE 9 MG/ML
INJECTION, SOLUTION INTRAVENOUS CONTINUOUS
Status: DISCONTINUED | OUTPATIENT
Start: 2022-03-23 | End: 2022-03-23

## 2022-03-23 RX ORDER — IOPAMIDOL 755 MG/ML
100 INJECTION, SOLUTION INTRAVASCULAR ONCE
Status: COMPLETED | OUTPATIENT
Start: 2022-03-23 | End: 2022-03-23

## 2022-03-23 RX ORDER — GLYCOPYRROLATE 0.2 MG/ML
INJECTION, SOLUTION INTRAMUSCULAR; INTRAVENOUS PRN
Status: DISCONTINUED | OUTPATIENT
Start: 2022-03-23 | End: 2022-03-23

## 2022-03-23 RX ORDER — BUPIVACAINE HYDROCHLORIDE AND EPINEPHRINE 2.5; 5 MG/ML; UG/ML
INJECTION, SOLUTION EPIDURAL; INFILTRATION; INTRACAUDAL; PERINEURAL PRN
Status: DISCONTINUED | OUTPATIENT
Start: 2022-03-23 | End: 2022-03-23 | Stop reason: HOSPADM

## 2022-03-23 RX ORDER — HYDROXYZINE HYDROCHLORIDE 25 MG/1
25 TABLET, FILM COATED ORAL EVERY 6 HOURS PRN
Status: DISCONTINUED | OUTPATIENT
Start: 2022-03-23 | End: 2022-03-24 | Stop reason: HOSPADM

## 2022-03-23 RX ORDER — LIDOCAINE 40 MG/G
CREAM TOPICAL
Status: DISCONTINUED | OUTPATIENT
Start: 2022-03-23 | End: 2022-03-24 | Stop reason: HOSPADM

## 2022-03-23 RX ADMIN — SODIUM CHLORIDE, POTASSIUM CHLORIDE, SODIUM LACTATE AND CALCIUM CHLORIDE: 600; 310; 30; 20 INJECTION, SOLUTION INTRAVENOUS at 17:32

## 2022-03-23 RX ADMIN — MIDAZOLAM 2 MG: 1 INJECTION INTRAMUSCULAR; INTRAVENOUS at 16:46

## 2022-03-23 RX ADMIN — FENTANYL CITRATE 100 MCG: 50 INJECTION, SOLUTION INTRAMUSCULAR; INTRAVENOUS at 16:50

## 2022-03-23 RX ADMIN — KETOROLAC TROMETHAMINE 15 MG: 15 INJECTION, SOLUTION INTRAMUSCULAR; INTRAVENOUS at 10:34

## 2022-03-23 RX ADMIN — FENTANYL CITRATE 50 MCG: 50 INJECTION, SOLUTION INTRAMUSCULAR; INTRAVENOUS at 13:08

## 2022-03-23 RX ADMIN — SODIUM CHLORIDE: 9 INJECTION, SOLUTION INTRAVENOUS at 21:32

## 2022-03-23 RX ADMIN — ONDANSETRON 4 MG: 2 INJECTION INTRAMUSCULAR; INTRAVENOUS at 10:34

## 2022-03-23 RX ADMIN — ONDANSETRON HYDROCHLORIDE 4 MG: 2 INJECTION, SOLUTION INTRAVENOUS at 18:01

## 2022-03-23 RX ADMIN — ROCURONIUM BROMIDE 40 MG: 50 INJECTION, SOLUTION INTRAVENOUS at 16:50

## 2022-03-23 RX ADMIN — FENTANYL CITRATE 100 MCG: 50 INJECTION, SOLUTION INTRAMUSCULAR; INTRAVENOUS at 17:36

## 2022-03-23 RX ADMIN — PROPOFOL 200 MG: 10 INJECTION, EMULSION INTRAVENOUS at 16:50

## 2022-03-23 RX ADMIN — SODIUM CHLORIDE, POTASSIUM CHLORIDE, SODIUM LACTATE AND CALCIUM CHLORIDE: 600; 310; 30; 20 INJECTION, SOLUTION INTRAVENOUS at 15:47

## 2022-03-23 RX ADMIN — DEXAMETHASONE SODIUM PHOSPHATE 4 MG: 4 INJECTION, SOLUTION INTRA-ARTICULAR; INTRALESIONAL; INTRAMUSCULAR; INTRAVENOUS; SOFT TISSUE at 16:50

## 2022-03-23 RX ADMIN — Medication 15 MG: at 16:59

## 2022-03-23 RX ADMIN — SENNOSIDES AND DOCUSATE SODIUM 2 TABLET: 50; 8.6 TABLET ORAL at 21:32

## 2022-03-23 RX ADMIN — GLYCOPYRROLATE 0.6 MG: 0.2 INJECTION, SOLUTION INTRAMUSCULAR; INTRAVENOUS at 18:01

## 2022-03-23 RX ADMIN — SODIUM CHLORIDE 1000 ML: 9 INJECTION, SOLUTION INTRAVENOUS at 10:33

## 2022-03-23 RX ADMIN — LIDOCAINE HYDROCHLORIDE 50 MG: 10 INJECTION, SOLUTION INFILTRATION; PERINEURAL at 16:50

## 2022-03-23 RX ADMIN — ONDANSETRON 4 MG: 2 INJECTION INTRAMUSCULAR; INTRAVENOUS at 18:41

## 2022-03-23 RX ADMIN — Medication 2 G: at 16:46

## 2022-03-23 RX ADMIN — ROCURONIUM BROMIDE 10 MG: 50 INJECTION, SOLUTION INTRAVENOUS at 17:02

## 2022-03-23 RX ADMIN — IOPAMIDOL 100 ML: 755 INJECTION, SOLUTION INTRAVENOUS at 11:14

## 2022-03-23 RX ADMIN — NEOSTIGMINE METHYLSULFATE 3 MG: 1 INJECTION, SOLUTION INTRAVENOUS at 18:01

## 2022-03-23 ASSESSMENT — ENCOUNTER SYMPTOMS
FEVER: 0
HEADACHES: 0
VOMITING: 0
ABDOMINAL DISTENTION: 1
HEMATURIA: 0
DYSURIA: 0
BLOOD IN STOOL: 0
SORE THROAT: 0
ABDOMINAL PAIN: 1
NAUSEA: 1
COUGH: 0

## 2022-03-23 NOTE — ANESTHESIA CARE TRANSFER NOTE
Patient: Michaela Garcia    Procedure: Procedure(s):  INCISIONAL HERNIA REPAIR       Diagnosis: Incisional hernia with obstruction but no gangrene [K43.0]  Diagnosis Additional Information: No value filed.    Anesthesia Type:   General     Note:      Level of Consciousness: awake  Oxygen Supplementation: face mask    Independent Airway: airway patency satisfactory and stable  Dentition: dentition unchanged  Vital Signs Stable: post-procedure vital signs reviewed and stable  Report to RN Given: handoff report given  Patient transferred to: PACU    Handoff Report: Identifed the Patient, Identified the Reponsible Provider, Reviewed the pertinent medical history, Discussed the surgical course, Reviewed Intra-OP anesthesia mangement and issues during anesthesia, Set expectations for post-procedure period and Allowed opportunity for questions and acknowledgement of understanding      Vitals:  Vitals Value Taken Time   /68 03/23/22 1811   Temp     Pulse 79 03/23/22 1812   Resp 36 03/23/22 1813   SpO2 100 % 03/23/22 1812   Vitals shown include unvalidated device data.    Electronically Signed By: LAMBERTO Chatman CRNA  March 23, 2022  6:14 PM

## 2022-03-23 NOTE — OP NOTE
General Surgery Operative Note    Pre-operative diagnosis:  Incisional hernia with obstruction but no gangrene [K43.0]   Post-operative diagnosis:  Same, extensive adhesions of omentum to the hernia sac   Procedure: Incisional Herniorrhaphy without Mesh, lysis of adhesions due to omentum adherent to the hernia sac   Surgeon: Vasquez Pope MD   Assistant(s): Ciaran Paredes PA-C The physicians assistant was medically necessary for their expertise in retraction, suturing, hemostasis, and suctioning.     Anesthesia: General    Estimated blood loss: 20 cc's   Drains placed: None   Complications:  None   Findings:     Specimens:   ID Type Source Tests Collected by Time Destination   1 : INCISIONAL HERNIA SAC Tissue Hernia Sac SURGICAL PATHOLOGY EXAM Vasquez Pope MD 3/23/2022  5:52 PM      Indications: This 57-year-old female presented to the emergency room with central abdominal pain.  She was felt to have a mass in her central abdomen at the site of the previous hernia repair (primary suture repair).  She was not aware the hernia was recurrent.  Emergency room reduction attempts were followed by a CT scan which showed a loop of small bowel still trapped within the hernia.  She now requests surgery for hernia repair and possible small bowel resection.  She requests that mesh not to be used for her hernia repair and understands that this will increase the risk of the hernia recurring in the future.  The procedure its risks, benefits, complications, convalescence, postop limitations, risk of bleeding and infection and expected recovery were discussed with her preoperatively.  She seems understand all these issues and wishes to proceed with surgery.    Description: Patient brought the operating room placed upon the table and after induction of anesthetic the central abdomen is prepped and draped in a sterile manner.  A pause is performed.  A midline incision was made extending around the left side of the umbilicus.   Incision is carried out to the hernia sac.  There is considerable edema in the tissues.  The sac is gradually mobilized from the surrounding connective tissues.  Care was taken to preserve the umbilical skin although this was substantially attenuated by the hernia.  A portion of the hernia sac was left on the umbilicus to help preserve blood supply to the skin.  The hernia sac was entered and no bowel was found within it.  The sac had multiple loculations and extensive omental adhesions within it.  These adhesions were gradually taken down and the sac was gradually excised.  The omentum was reduced into the abdominal cavity.  The small bowel was evaluated by first identifying the ileocecal valve and running the small bowel as far proximally as possible.  There was some areas of mild inflammation but no areas suggestive of infarction.  The omentum was noted to be adherent to the sigmoid colon.  After evaluating the small bowel we then proceeded with primary herniorrhaphy using heavy PDS sutures to close the fascial defect.  Prior to closing, Marcaine was placed for postop pain relief.  The subcutaneous space was then drained with a 15 Avni drain and subcutaneous tissues were closed with 3-0 Vicryl and skin with staples.  The umbilical skin appeared viable.  Dressings and an abdominal binder were applied and she was returned to the recovery room in excellent condition with all sponge and needle counts correct, having tolerated the procedure well.      Vasquez Pope MD

## 2022-03-23 NOTE — ED TRIAGE NOTES
Pt presents to ED with abdominal pain. Started yesterday morning, wasn't too bad at that time. By bedtime it got really bad. Kept pt awake most of the night. Worse with movement and with bearing down to have a BM. Intermittent cramping. Took a glycerin suppository with a good bowel movement, but no improvement with pain.   Hx of umbilical hernia, gall bladder removal. Denies fever or diarrhea.

## 2022-03-23 NOTE — ANESTHESIA POSTPROCEDURE EVALUATION
Patient: Michaela Garcia    Procedure: Procedure(s):  INCISIONAL HERNIA REPAIR       Anesthesia Type:  General    Note:  Disposition: Outpatient   Postop Pain Control: Uneventful            Sign Out: Well controlled pain   PONV: No   Neuro/Psych: Uneventful            Sign Out: Acceptable/Baseline neuro status   Airway/Respiratory: Uneventful            Sign Out: Acceptable/Baseline resp. status   CV/Hemodynamics: Uneventful            Sign Out: Acceptable CV status   Other NRE: NONE   DID A NON-ROUTINE EVENT OCCUR? No           Last vitals:  Vitals Value Taken Time   /64 03/23/22 1815   Temp 96.8  F (36  C) 03/23/22 1811   Pulse 68 03/23/22 1819   Resp 16 03/23/22 1819   SpO2 100 % 03/23/22 1819   Vitals shown include unvalidated device data.    Electronically Signed By: Romero Redd MD  March 23, 2022  6:21 PM

## 2022-03-23 NOTE — CONSULTS
[facility] Surgery Consultation    Michaela Garcia MRN# [id number]   Age: 57 year old YOB: 1965     Date of Admission:  3/23/2022    Reason for consult: Abdominal pain       Requesting physician: Vale       Level of consult: Consult, follow and place orders           Assessment and Plan:   Assessment:   Abdominal pain, incisional hernia with small bowel obstruction and incarcerated small bowel  Patient Active Problem List    Diagnosis Date Noted     S/P total knee arthroplasty, right 10/18/2021     Priority: Medium     EVA (obstructive sleep apnea) 12/11/2018     Priority: Medium     Morbid obesity (H) 10/12/2018     Priority: Medium           Plan:   Discussed hernia repair, possible small bowel resection if necessitated by infarction.  She does not want a mesh hernia repair as she has seen commercials on TV about mesh problems.  She understands that this will increase the risk of recurrence.  As the hernia has been partially reduced in the emergency room, we also discussed the option of rescanning and if the bowel has been reduced, she could have surgery at a later date however she wishes to proceed with surgery immediately today.            Chief Complaint:   Abdominal pain  Small bowel obstruction     History is obtained from the patient         History of Present Illness:   This patient is a 57 year old  female with a significant past medical history of Hashimoto's Hashimoto's, sleep apnea, ulcerative colitis, asthma and morbid obesity who presents with the following condition requiring a hospital admission: Central abdominal pain with bowel obstruction.  She reports that yesterday morning she noted some mild central abdominal discomfort that is gradually progressed.  She has never had pain like this before.  She is not aware that she has hernia at this site.  She was told at the time of her cholecystectomy 16 years ago that she did have a small hernia that was primarily  repaired with sutures.    She reports no blood thinners, she was briefly on them perioperatively for her knee surgery.          Past Medical History:     Past Medical History:   Diagnosis Date     Arthritis     both knees     Hashimoto's disease      History of blood transfusion 1999     Hyperglycemia      Obesity      Palpitations      Sleep apnea     CPAP     Ulcerative colitis (H)      Uncomplicated asthma     assoc with cats, dust and really cold weather             Past Surgical History:     Past Surgical History:   Procedure Laterality Date     ARTHROPLASTY KNEE Right 10/18/2021    Procedure: Right total knee arthroplasty using an Arthrex iBalance knee system;  Surgeon: Jenaro Jewell MD;  Location: [facility] OR     CHOLECYSTECTOMY  2006     ENT SURGERY  1994    sinus with T&A     GYN SURGERY  1999    D&C             Social History:     Social History     Tobacco Use     Smoking status: Never Smoker     Smokeless tobacco: Never Used   Substance Use Topics     Alcohol use: Yes     Comment: maybe once a month              Family History:     Family History   Adopted: Yes   Problem Relation Age of Onset     Unknown/Adopted Mother      Unknown/Adopted Father              Immunizations:     VACCINE/DOSE   Diptheria   DPT   DTAP   HBIG   Hepatitis A   Hepatitis B   HIB   Influenza   Measles   Meningococcal   MMR   Mumps   Pneumococcal   Polio   Rubella   Small Pox   TDAP   Varicella   Zoster             Allergies:     Allergies   Allergen Reactions     Cats      Dust Mites      Erythromycin Nausea             Medications:     Current Facility-Administered Medications   Medication     ceFAZolin (ANCEF) intermittent infusion 2 g in 100 mL dextrose PRE-MIX     ondansetron (ZOFRAN) injection 4 mg     sodium chloride 0.9% infusion     Current Outpatient Medications   Medication Sig     acetaminophen (TYLENOL) 325 MG tablet Take 2 tablets (650 mg) by mouth every 4 hours as needed for other (mild pain)      acetaminophen (TYLENOL) 500 MG tablet 2 tablets by Oral or Feeding Tube route as needed     hydrOXYzine (ATARAX) 25 MG tablet Take 1 tablet (25 mg) by mouth every 6 hours as needed for itching or anxiety (with pain, moderate pain)     IBUPROFEN PO Take by mouth every 4 hours as needed for moderate pain     Naproxen Sod-Diphenhydramine (ALEVE PM PO) Take by mouth At Bedtime      oxyCODONE (ROXICODONE) 5 MG tablet Take 1-2 tablets (5-10 mg) by mouth every 3 hours as needed for pain (Moderate to Severe)     senna-docusate (SENOKOT-S/PERICOLACE) 8.6-50 MG tablet Take 1-2 tablets by mouth 2 times daily Take while on oral narcotics to prevent or treat constipation.     thyroid (ARMOUR) 30 MG tablet Take 30 mg by mouth daily     VITAMIN D, CHOLECALCIFEROL, PO Take 50,000 Units by mouth once a week             Review of Systems:   CV: NEGATIVE for chest pain, palpitations or peripheral edema  C: NEGATIVE for fever, chills, change in weight  E/M: NEGATIVE for ear, mouth and throat problems  R: NEGATIVE for significant cough or SOB          Physical Exam:   All vitals have been reviewed  Patient Vitals for the past 24 hrs:   BP Temp Temp src Pulse Resp SpO2 Weight   03/23/22 1230 127/67 -- -- 68 -- 94 % --   03/23/22 1215 118/64 -- -- 63 -- 94 % --   03/23/22 1200 120/65 -- -- 60 -- 91 % --   03/23/22 1155 123/70 -- -- 64 -- 99 % --   03/23/22 1045 -- -- -- -- -- 95 % --   03/23/22 0934 127/87 98.6  F (37  C) Temporal 79 18 99 % --   03/23/22 0933 -- -- -- -- -- -- 90.7 kg (200 lb)     No intake or output data in the 24 hours ending 03/23/22 1345  Neck:   skin normal and no stridor     Chest / Breast:   Normal respiratory effort     Abdomen:   soft, rounded and obese but non-distended, tenderness noted at the hernia site, voluntary guarding absent, no masses palpated and hernia present in the central abdomen consistent with recurrence of her previously repaired hernia.  With persistent gentle pressure this is partially  reduced.  Complete reduction cannot be achieved.             Data:   All laboratory data reviewed  Results for orders placed or performed during the hospital encounter of 03/23/22   CT Abdomen Pelvis w Contrast     Status: None    Narrative    CT ABDOMEN AND PELVIS WITH CONTRAST 3/23/2022 11:23 AM    CLINICAL HISTORY: Diverticulitis suspected; RLQ abdominal pain,  appendicitis suspected (Age >= 14y).    TECHNIQUE: CT scan of the abdomen and pelvis was performed following  injection of IV contrast. Multiplanar reformats were obtained. Dose  reduction techniques were used.    CONTRAST: 100mL Isovue-370    COMPARISON: None.    FINDINGS:   LOWER CHEST: Normal.    HEPATOBILIARY: Gallbladder is absent. No focal liver lesions.    PANCREAS: Normal.    SPLEEN: Normal.    ADRENAL GLANDS: Normal.    KIDNEYS/BLADDER: Nonobstructing 5 mm calculus in the left renal  collecting system. No hydronephrosis or hydroureter on either side. No  evidence of renal mass. Urinary bladder unremarkable.    BOWEL: There are dilated loops of small bowel measuring up to 3.1 cm  with air-fluid levels that are proximal to an inflamed appearing  umbilical hernia that contains a loop of small bowel. Distal small  bowel is decompressed. The colon is not dilated. No free air. The  appendix is not visualized.    PELVIC ORGANS: Normal.    ADDITIONAL FINDINGS: There is a small amount of ascites in the pelvis.  No lymphadenopathy. Caliber of the abdominal aorta within normal  limits.    MUSCULOSKELETAL: No destructive bone lesions.      Impression    IMPRESSION:   1.  Inflamed appearing umbilical hernia contains loops of small bowel  and causes mechanical obstruction. There is a small amount of  associated ascites.  2.  Nonobstructing left nephrolithiasis.    JAMIE PAEZ MD         SYSTEM ID:  [id number]   Basic metabolic panel (BMP)     Status: Abnormal   Result Value Ref Range    Sodium 139 133 - 144 mmol/L    Potassium 4.2 3.4 - 5.3 mmol/L     Chloride 107 94 - 109 mmol/L    Carbon Dioxide (CO2) 29 20 - 32 mmol/L    Anion Gap 3 3 - 14 mmol/L    Urea Nitrogen 13 7 - 30 mg/dL    Creatinine 0.62 0.52 - 1.04 mg/dL    Calcium 9.6 8.5 - 10.1 mg/dL    Glucose 119 (H) 70 - 99 mg/dL    GFR Estimate >90 >60 mL/min/1.73m2   [place] Draw     Status: None    Narrative    The following orders were created for panel order [place] Draw.  Procedure                               Abnormality         Status                     ---------                               -----------         ------                     Extra Red Top Tube[[id number]]                               Final result                 Please view results for these tests on the individual orders.   UA with Microscopic reflex to Culture     Status: Abnormal    Specimen: Urine, Midstream   Result Value Ref Range    Color Urine Light Yellow Colorless, Straw, Light Yellow, Yellow    Appearance Urine Clear Clear    Glucose Urine Negative Negative mg/dL    Bilirubin Urine Negative Negative    Ketones Urine Trace (A) Negative mg/dL    Specific Gravity Urine 1.020 1.003 - 1.035    Blood Urine Negative Negative    pH Urine 6.0 5.0 - 7.0    Protein Albumin Urine Negative Negative mg/dL    Urobilinogen Urine Normal Normal, 2.0 mg/dL    Nitrite Urine Negative Negative    Leukocyte Esterase Urine Negative Negative    Mucus Urine Present (A) None Seen /LPF    RBC Urine 1 <=2 /HPF    WBC Urine 0 <=5 /HPF    Narrative    Urine Culture not indicated   CBC with platelets and differential     Status: None   Result Value Ref Range    WBC Count 10.0 4.0 - 11.0 10e3/uL    RBC Count 4.89 3.80 - 5.20 10e6/uL    Hemoglobin 14.5 11.7 - 15.7 g/dL    Hematocrit 45.8 35.0 - 47.0 %    MCV 94 78 - 100 fL    MCH 29.7 26.5 - 33.0 pg    MCHC 31.7 31.5 - 36.5 g/dL    RDW 13.8 10.0 - 15.0 %    Platelet Count 314 150 - 450 10e3/uL    % Neutrophils 75 %    % Lymphocytes 20 %    % Monocytes 4 %    % Eosinophils 1 %    % Basophils 0 %    % Immature  Granulocytes 0 %    NRBCs per 100 WBC 0 <1 /100    Absolute Neutrophils 7.4 1.6 - 8.3 10e3/uL    Absolute Lymphocytes 2.0 0.8 - 5.3 10e3/uL    Absolute Monocytes 0.4 0.0 - 1.3 10e3/uL    Absolute Eosinophils 0.1 0.0 - 0.7 10e3/uL    Absolute Basophils 0.0 0.0 - 0.2 10e3/uL    Absolute Immature Granulocytes 0.0 <=0.4 10e3/uL    Absolute NRBCs 0.0 10e3/uL   Extra Red Top Tube     Status: None   Result Value Ref Range    Hold Specimen [facility]    CBC + differential     Status: None    Narrative    The following orders were created for panel order CBC + differential.  Procedure                               Abnormality         Status                     ---------                               -----------         ------                     CBC with platelets and d...[[id number]]                      Final result                 Please view results for these tests on the individual orders.     CT scan of the abdomen:   Small bowel: Mildly dilated proximal to the hernia, decompressed distally  CT scan interpreted by radiologist        Attestation:  I have reviewed today's vital signs, notes, medications, labs and imaging.  Amount of time performed on this consult: 90 minutes.    Vasquez Pope MD

## 2022-03-23 NOTE — ED PROVIDER NOTES
History   Chief Complaint:  Abdominal Pain       HPI   Michaela Garcia is a 57 year old female with history of hypertension and prediabetes who presents with mid-abdominal pain since yesterday. The patient states her pain started out as mild yesterday morning and then worsened throughout the day. She describes it as a cramping pain which waxes and wains in severity and made worse with moving around. She has never had pain like this before. She also reports nausea and some bloating. Denies any fevers, vomiting, or bloody stools, nor any dysuria or hematuria. She further denies chest pain, headache, sore throat, or cough. She has had her gallbladder removed but still has her appendix and uterus. So far she has tried ibuprofen so far for the pain with little relief. Right now her pain is 3/10.    Review of Systems   Constitutional: Negative for fever.   HENT: Negative for sore throat.    Respiratory: Negative for cough.    Cardiovascular: Negative for chest pain.   Gastrointestinal: Positive for abdominal distention, abdominal pain and nausea. Negative for blood in stool and vomiting.   Genitourinary: Negative for dysuria and hematuria.   Neurological: Negative for headaches.   All other systems reviewed and are negative.    Allergies:  Cats  Dust Mites  Erythromyci    Medications:  Aspirin  Vistaril  Advil/Motrin  Tylenol  Atarax  Senokot    Past Medical History:     Arthritis  Hashimoto's disease  Obesity  Sleep apnea  Ulcerative colitis  Asthma    Depression  Hypertension  Prediabetes    Past Surgical History:    Right TKA  Cholecystectomy  Sinus surgery with tonsillectomy and adenoidectomy  Dilation and curettage     Family History:    Son: Recurrent bone lesions and fractures    Social History:  The patient presents alone. Never smoker.    Physical Exam     Patient Vitals for the past 24 hrs:   BP Temp Temp src Pulse Resp SpO2 Weight   03/23/22 1811 139/68 -- -- 79 27 100 % --   03/23/22 1509 125/61 97.8  F  (36.6  C) Temporal 66 16 94 % --   03/23/22 1430 130/71 -- -- 61 -- 91 % --   03/23/22 1415 117/73 -- -- 64 -- 94 % --   03/23/22 1400 118/59 -- -- 72 -- 98 % --   03/23/22 1345 115/56 -- -- 62 -- 94 % --   03/23/22 1330 118/60 -- -- 64 -- 94 % --   03/23/22 1315 125/71 -- -- 64 -- 91 % --   03/23/22 1300 124/69 -- -- 64 -- 92 % --   03/23/22 1245 123/66 -- -- 65 -- 93 % --   03/23/22 1230 127/67 -- -- 68 -- 94 % --   03/23/22 1215 118/64 -- -- 63 -- 94 % --   03/23/22 1200 120/65 -- -- 60 -- 91 % --   03/23/22 1155 123/70 -- -- 64 -- 99 % --   03/23/22 1045 -- -- -- -- -- 95 % --   03/23/22 0934 127/87 98.6  F (37  C) Temporal 79 18 99 % --   03/23/22 0933 -- -- -- -- -- -- 90.7 kg (200 lb)       Physical Exam  Vitals and nursing note reviewed.   Constitutional:       Appearance: Normal appearance.   HENT:      Head: Atraumatic.      Right Ear: External ear normal.      Left Ear: External ear normal.      Nose: Nose normal.      Mouth/Throat:      Mouth: Mucous membranes are moist.   Eyes:      Extraocular Movements: Extraocular movements intact.      Conjunctiva/sclera: Conjunctivae normal.   Cardiovascular:      Rate and Rhythm: Normal rate and regular rhythm.      Heart sounds: No murmur heard.  Pulmonary:      Effort: Pulmonary effort is normal. No respiratory distress.      Breath sounds: Normal breath sounds. No wheezing, rhonchi or rales.   Abdominal:      General: Abdomen is flat. Bowel sounds are normal. There is no distension.      Palpations: Abdomen is soft.      Tenderness: There is abdominal tenderness (mild) in the right lower quadrant, suprapubic area and left lower quadrant. There is guarding. There is no rebound.   Musculoskeletal:         General: No deformity or signs of injury.      Cervical back: Normal range of motion and neck supple.   Skin:     General: Skin is warm and dry.      Findings: No rash.   Neurological:      Mental Status: She is alert and oriented to person, place, and time.    Psychiatric:         Mood and Affect: Mood normal.         Behavior: Behavior normal.         Emergency Department Course     Imaging:  CT Abdomen Pelvis w Contrast   Final Result   IMPRESSION:    1.  Inflamed appearing umbilical hernia contains loops of small bowel   and causes mechanical obstruction. There is a small amount of   associated ascites.   2.  Nonobstructing left nephrolithiasis.      JAMIE PAEZ MD            SYSTEM ID:  [id number]        Report per radiology    Laboratory:  Labs Ordered and Resulted from Time of ED Arrival to Time of ED Departure   BASIC METABOLIC PANEL - Abnormal       Result Value    Sodium 139      Potassium 4.2      Chloride 107      Carbon Dioxide (CO2) 29      Anion Gap 3      Urea Nitrogen 13      Creatinine 0.62      Calcium 9.6      Glucose 119 (*)     GFR Estimate >90     ROUTINE UA WITH MICROSCOPIC REFLEX TO CULTURE - Abnormal    Color Urine Light Yellow      Appearance Urine Clear      Glucose Urine Negative      Bilirubin Urine Negative      Ketones Urine Trace (*)     Specific Gravity Urine 1.020      Blood Urine Negative      pH Urine 6.0      Protein Albumin Urine Negative      Urobilinogen Urine Normal      Nitrite Urine Negative      Leukocyte Esterase Urine Negative      Mucus Urine Present (*)     RBC Urine 1      WBC Urine 0     CBC WITH PLATELETS AND DIFFERENTIAL    WBC Count 10.0      RBC Count 4.89      Hemoglobin 14.5      Hematocrit 45.8      MCV 94      MCH 29.7      MCHC 31.7      RDW 13.8      Platelet Count 314      % Neutrophils 75      % Lymphocytes 20      % Monocytes 4      % Eosinophils 1      % Basophils 0      % Immature Granulocytes 0      NRBCs per 100 WBC 0      Absolute Neutrophils 7.4      Absolute Lymphocytes 2.0      Absolute Monocytes 0.4      Absolute Eosinophils 0.1      Absolute Basophils 0.0      Absolute Immature Granulocytes 0.0      Absolute NRBCs 0.0          Procedures  None    Emergency Department Course:  Reviewed:  I  reviewed nursing notes, vitals, past medical history and Care Everywhere    Assessments:  1021 I obtained history and examined the patient as noted above.   1210 I rechecked the patient and explained findings.  1310 I attempted to reduce the patient's hernia.    Consults:  1323 I spoke with Dr. Pope from general surgery. He states he will try to come see the patient.    Interventions:  1033 NS 1L IV Bolus  1034 Toradol, 15 mg Iv  1034 Zofran, 4 mg, IV  1308 Sublimaze, 50 mcg, IV    Disposition:  The patient was transferred to the OR under the care of Dr. Pope    Impression & Plan     CMS Diagnoses: None    Medical Decision Makin yo F with abdominal pain.  Suspect diverticulitis, less likely appendicitis, cystitis, constipation, bowel obstruction.  Labs are reassuring, but her exam is concerning for one of those etiologies, so will proceed with CT for further eval.  Pain meds, anti-emetics and IVF given for symptomatic care.      1323  CT shows an incarcerated hernia in the umbilicus with SBO.  I attempted to reduce at bedside but was unsuccessful.  D/w Dr Pope who will come to the ED and assess patient.   1345  Dr Pope planning to take patient to the OR.       Diagnosis:    ICD-10-CM    1. Incisional hernia with obstruction but no gangrene  K43.0 Case Request: HERNIORRHAPHY, UMBILICAL, OPEN     Case Request: HERNIORRHAPHY, UMBILICAL, OPEN   2. Incarcerated umbilical hernia  K42.0    3. Small bowel obstruction (H)  K56.609 oxyCODONE (ROXICODONE) 5 MG tablet       Discharge Medications:  Current Discharge Medication List          Scribe Disclosure:  Latasha [place], am serving as a scribe at 10:32 AM on 3/23/2022 to document services personally performed by Ubaldo Collazo MD based on my observations and the provider's statements to me.     Ubaldo Collazo MD  [id number]

## 2022-03-23 NOTE — H&P (VIEW-ONLY)
[facility] Surgery Consultation    Michaela Garcia MRN# [id number]   Age: 57 year old YOB: 1965     Date of Admission:  3/23/2022    Reason for consult: Abdominal pain       Requesting physician: Vale       Level of consult: Consult, follow and place orders           Assessment and Plan:   Assessment:   Abdominal pain, incisional hernia with small bowel obstruction and incarcerated small bowel  Patient Active Problem List    Diagnosis Date Noted     S/P total knee arthroplasty, right 10/18/2021     Priority: Medium     EVA (obstructive sleep apnea) 12/11/2018     Priority: Medium     Morbid obesity (H) 10/12/2018     Priority: Medium           Plan:   Discussed hernia repair, possible small bowel resection if necessitated by infarction.  She does not want a mesh hernia repair as she has seen commercials on TV about mesh problems.  She understands that this will increase the risk of recurrence.  As the hernia has been partially reduced in the emergency room, we also discussed the option of rescanning and if the bowel has been reduced, she could have surgery at a later date however she wishes to proceed with surgery immediately today.            Chief Complaint:   Abdominal pain  Small bowel obstruction     History is obtained from the patient         History of Present Illness:   This patient is a 57 year old  female with a significant past medical history of Hashimoto's Hashimoto's, sleep apnea, ulcerative colitis, asthma and morbid obesity who presents with the following condition requiring a hospital admission: Central abdominal pain with bowel obstruction.  She reports that yesterday morning she noted some mild central abdominal discomfort that is gradually progressed.  She has never had pain like this before.  She is not aware that she has hernia at this site.  She was told at the time of her cholecystectomy 16 years ago that she did have a small hernia that was primarily  repaired with sutures.    She reports no blood thinners, she was briefly on them perioperatively for her knee surgery.          Past Medical History:     Past Medical History:   Diagnosis Date     Arthritis     both knees     Hashimoto's disease      History of blood transfusion 1999     Hyperglycemia      Obesity      Palpitations      Sleep apnea     CPAP     Ulcerative colitis (H)      Uncomplicated asthma     assoc with cats, dust and really cold weather             Past Surgical History:     Past Surgical History:   Procedure Laterality Date     ARTHROPLASTY KNEE Right 10/18/2021    Procedure: Right total knee arthroplasty using an Arthrex iBalance knee system;  Surgeon: Jenaro Jewell MD;  Location: [facility] OR     CHOLECYSTECTOMY  2006     ENT SURGERY  1994    sinus with T&A     GYN SURGERY  1999    D&C             Social History:     Social History     Tobacco Use     Smoking status: Never Smoker     Smokeless tobacco: Never Used   Substance Use Topics     Alcohol use: Yes     Comment: maybe once a month              Family History:     Family History   Adopted: Yes   Problem Relation Age of Onset     Unknown/Adopted Mother      Unknown/Adopted Father              Immunizations:     VACCINE/DOSE   Diptheria   DPT   DTAP   HBIG   Hepatitis A   Hepatitis B   HIB   Influenza   Measles   Meningococcal   MMR   Mumps   Pneumococcal   Polio   Rubella   Small Pox   TDAP   Varicella   Zoster             Allergies:     Allergies   Allergen Reactions     Cats      Dust Mites      Erythromycin Nausea             Medications:     Current Facility-Administered Medications   Medication     ceFAZolin (ANCEF) intermittent infusion 2 g in 100 mL dextrose PRE-MIX     ondansetron (ZOFRAN) injection 4 mg     sodium chloride 0.9% infusion     Current Outpatient Medications   Medication Sig     acetaminophen (TYLENOL) 325 MG tablet Take 2 tablets (650 mg) by mouth every 4 hours as needed for other (mild pain)      acetaminophen (TYLENOL) 500 MG tablet 2 tablets by Oral or Feeding Tube route as needed     hydrOXYzine (ATARAX) 25 MG tablet Take 1 tablet (25 mg) by mouth every 6 hours as needed for itching or anxiety (with pain, moderate pain)     IBUPROFEN PO Take by mouth every 4 hours as needed for moderate pain     Naproxen Sod-Diphenhydramine (ALEVE PM PO) Take by mouth At Bedtime      oxyCODONE (ROXICODONE) 5 MG tablet Take 1-2 tablets (5-10 mg) by mouth every 3 hours as needed for pain (Moderate to Severe)     senna-docusate (SENOKOT-S/PERICOLACE) 8.6-50 MG tablet Take 1-2 tablets by mouth 2 times daily Take while on oral narcotics to prevent or treat constipation.     thyroid (ARMOUR) 30 MG tablet Take 30 mg by mouth daily     VITAMIN D, CHOLECALCIFEROL, PO Take 50,000 Units by mouth once a week             Review of Systems:   CV: NEGATIVE for chest pain, palpitations or peripheral edema  C: NEGATIVE for fever, chills, change in weight  E/M: NEGATIVE for ear, mouth and throat problems  R: NEGATIVE for significant cough or SOB          Physical Exam:   All vitals have been reviewed  Patient Vitals for the past 24 hrs:   BP Temp Temp src Pulse Resp SpO2 Weight   03/23/22 1230 127/67 -- -- 68 -- 94 % --   03/23/22 1215 118/64 -- -- 63 -- 94 % --   03/23/22 1200 120/65 -- -- 60 -- 91 % --   03/23/22 1155 123/70 -- -- 64 -- 99 % --   03/23/22 1045 -- -- -- -- -- 95 % --   03/23/22 0934 127/87 98.6  F (37  C) Temporal 79 18 99 % --   03/23/22 0933 -- -- -- -- -- -- 90.7 kg (200 lb)     No intake or output data in the 24 hours ending 03/23/22 1345  Neck:   skin normal and no stridor     Chest / Breast:   Normal respiratory effort     Abdomen:   soft, rounded and obese but non-distended, tenderness noted at the hernia site, voluntary guarding absent, no masses palpated and hernia present in the central abdomen consistent with recurrence of her previously repaired hernia.  With persistent gentle pressure this is partially  reduced.  Complete reduction cannot be achieved.             Data:   All laboratory data reviewed  Results for orders placed or performed during the hospital encounter of 03/23/22   CT Abdomen Pelvis w Contrast     Status: None    Narrative    CT ABDOMEN AND PELVIS WITH CONTRAST 3/23/2022 11:23 AM    CLINICAL HISTORY: Diverticulitis suspected; RLQ abdominal pain,  appendicitis suspected (Age >= 14y).    TECHNIQUE: CT scan of the abdomen and pelvis was performed following  injection of IV contrast. Multiplanar reformats were obtained. Dose  reduction techniques were used.    CONTRAST: 100mL Isovue-370    COMPARISON: None.    FINDINGS:   LOWER CHEST: Normal.    HEPATOBILIARY: Gallbladder is absent. No focal liver lesions.    PANCREAS: Normal.    SPLEEN: Normal.    ADRENAL GLANDS: Normal.    KIDNEYS/BLADDER: Nonobstructing 5 mm calculus in the left renal  collecting system. No hydronephrosis or hydroureter on either side. No  evidence of renal mass. Urinary bladder unremarkable.    BOWEL: There are dilated loops of small bowel measuring up to 3.1 cm  with air-fluid levels that are proximal to an inflamed appearing  umbilical hernia that contains a loop of small bowel. Distal small  bowel is decompressed. The colon is not dilated. No free air. The  appendix is not visualized.    PELVIC ORGANS: Normal.    ADDITIONAL FINDINGS: There is a small amount of ascites in the pelvis.  No lymphadenopathy. Caliber of the abdominal aorta within normal  limits.    MUSCULOSKELETAL: No destructive bone lesions.      Impression    IMPRESSION:   1.  Inflamed appearing umbilical hernia contains loops of small bowel  and causes mechanical obstruction. There is a small amount of  associated ascites.  2.  Nonobstructing left nephrolithiasis.    JAMIE PAEZ MD         SYSTEM ID:  [id number]   Basic metabolic panel (BMP)     Status: Abnormal   Result Value Ref Range    Sodium 139 133 - 144 mmol/L    Potassium 4.2 3.4 - 5.3 mmol/L     Chloride 107 94 - 109 mmol/L    Carbon Dioxide (CO2) 29 20 - 32 mmol/L    Anion Gap 3 3 - 14 mmol/L    Urea Nitrogen 13 7 - 30 mg/dL    Creatinine 0.62 0.52 - 1.04 mg/dL    Calcium 9.6 8.5 - 10.1 mg/dL    Glucose 119 (H) 70 - 99 mg/dL    GFR Estimate >90 >60 mL/min/1.73m2   [place] Draw     Status: None    Narrative    The following orders were created for panel order [place] Draw.  Procedure                               Abnormality         Status                     ---------                               -----------         ------                     Extra Red Top Tube[[id number]]                               Final result                 Please view results for these tests on the individual orders.   UA with Microscopic reflex to Culture     Status: Abnormal    Specimen: Urine, Midstream   Result Value Ref Range    Color Urine Light Yellow Colorless, Straw, Light Yellow, Yellow    Appearance Urine Clear Clear    Glucose Urine Negative Negative mg/dL    Bilirubin Urine Negative Negative    Ketones Urine Trace (A) Negative mg/dL    Specific Gravity Urine 1.020 1.003 - 1.035    Blood Urine Negative Negative    pH Urine 6.0 5.0 - 7.0    Protein Albumin Urine Negative Negative mg/dL    Urobilinogen Urine Normal Normal, 2.0 mg/dL    Nitrite Urine Negative Negative    Leukocyte Esterase Urine Negative Negative    Mucus Urine Present (A) None Seen /LPF    RBC Urine 1 <=2 /HPF    WBC Urine 0 <=5 /HPF    Narrative    Urine Culture not indicated   CBC with platelets and differential     Status: None   Result Value Ref Range    WBC Count 10.0 4.0 - 11.0 10e3/uL    RBC Count 4.89 3.80 - 5.20 10e6/uL    Hemoglobin 14.5 11.7 - 15.7 g/dL    Hematocrit 45.8 35.0 - 47.0 %    MCV 94 78 - 100 fL    MCH 29.7 26.5 - 33.0 pg    MCHC 31.7 31.5 - 36.5 g/dL    RDW 13.8 10.0 - 15.0 %    Platelet Count 314 150 - 450 10e3/uL    % Neutrophils 75 %    % Lymphocytes 20 %    % Monocytes 4 %    % Eosinophils 1 %    % Basophils 0 %    % Immature  Granulocytes 0 %    NRBCs per 100 WBC 0 <1 /100    Absolute Neutrophils 7.4 1.6 - 8.3 10e3/uL    Absolute Lymphocytes 2.0 0.8 - 5.3 10e3/uL    Absolute Monocytes 0.4 0.0 - 1.3 10e3/uL    Absolute Eosinophils 0.1 0.0 - 0.7 10e3/uL    Absolute Basophils 0.0 0.0 - 0.2 10e3/uL    Absolute Immature Granulocytes 0.0 <=0.4 10e3/uL    Absolute NRBCs 0.0 10e3/uL   Extra Red Top Tube     Status: None   Result Value Ref Range    Hold Specimen [facility]    CBC + differential     Status: None    Narrative    The following orders were created for panel order CBC + differential.  Procedure                               Abnormality         Status                     ---------                               -----------         ------                     CBC with platelets and d...[[id number]]                      Final result                 Please view results for these tests on the individual orders.     CT scan of the abdomen:   Small bowel: Mildly dilated proximal to the hernia, decompressed distally  CT scan interpreted by radiologist        Attestation:  I have reviewed today's vital signs, notes, medications, labs and imaging.  Amount of time performed on this consult: 90 minutes.    Vasquez Pope MD

## 2022-03-23 NOTE — INTERVAL H&P NOTE
I have reviewed the surgical (or preoperative) H&P that is linked to this encounter, and examined the patient. There are no significant changes    Clinical Conditions Present on Arrival:  SECTIONPRESENTONADMISSIONBEGIN@                          Respiratory

## 2022-03-23 NOTE — ANESTHESIA PREPROCEDURE EVALUATION
Anesthesia Pre-Procedure Evaluation    Patient: Michaela Garcia   MRN: [id number] : 1965        Procedure : Procedure(s):  INCISIONAL HERNIA REPAIR          Past Medical History:   Diagnosis Date     Arthritis     both knees     Hashimoto's disease      History of blood transfusion      Hyperglycemia      Obesity      Palpitations      Sleep apnea     CPAP     Ulcerative colitis (H)      Uncomplicated asthma     assoc with cats, dust and really cold weather      Past Surgical History:   Procedure Laterality Date     ARTHROPLASTY KNEE Right 10/18/2021    Procedure: Right total knee arthroplasty using an Arthrex iBalance knee system;  Surgeon: Jenaro Jewell MD;  Location: [facility] OR     CHOLECYSTECTOMY  2006     ENT SURGERY      sinus with T&A     GYN SURGERY      D&C      Allergies   Allergen Reactions     Cats      Dust Mites      Erythromycin Nausea      Social History     Tobacco Use     Smoking status: Never Smoker     Smokeless tobacco: Never Used   Substance Use Topics     Alcohol use: Yes     Comment: maybe once a month       Wt Readings from Last 1 Encounters:   22 90.7 kg (200 lb)        Anesthesia Evaluation            ROS/MED HX  ENT/Pulmonary:     (+) sleep apnea, asthma     Neurologic:       Cardiovascular:  - neg cardiovascular ROS     METS/Exercise Tolerance:     Hematologic:       Musculoskeletal:       GI/Hepatic:       Renal/Genitourinary:       Endo:     (+) Obesity (BMI 35),     Psychiatric/Substance Use:       Infectious Disease:       Malignancy:       Other:            Physical Exam    Airway        Mallampati: II   TM distance: > 3 FB   Neck ROM: full     Respiratory Devices and Support         Dental           Cardiovascular   cardiovascular exam normal          Pulmonary   pulmonary exam normal                OUTSIDE LABS:  CBC:   Lab Results   Component Value Date    WBC 10.0 2022    HGB 14.5 2022    HGB 12.7 10/19/2021    HCT 45.8 2022      03/23/2022     10/18/2021     BMP:   Lab Results   Component Value Date     03/23/2022     12/13/2018    POTASSIUM 4.2 03/23/2022    POTASSIUM 3.9 12/13/2018    CHLORIDE 107 03/23/2022    CHLORIDE 104 12/13/2018    CO2 29 03/23/2022    CO2 30 12/13/2018    BUN 13 03/23/2022    BUN 16 12/13/2018    CR 0.62 03/23/2022    CR 0.67 10/18/2021     (H) 03/23/2022     (H) 12/13/2018     COAGS: No results found for: PTT, INR, FIBR  POC: No results found for: BGM, HCG, HCGS  HEPATIC:   Lab Results   Component Value Date    ALBUMIN 4.2 07/04/2018    PROTTOTAL 7.1 07/04/2018    ALT 18 07/04/2018    AST 21 07/04/2018    ALKPHOS 89 07/04/2018    BILITOTAL <0.2 07/04/2018     OTHER:   Lab Results   Component Value Date    MARGOTH 9.6 03/23/2022    T3 105 07/04/2018       Anesthesia Plan    ASA Status:  2   NPO Status:  NPO Appropriate    Anesthesia Type: General.      Maintenance: Balanced.        Consents    Anesthesia Plan(s) and associated risks, benefits, and realistic alternatives discussed. Questions answered and patient/representative(s) expressed understanding.    - Discussed:     - Discussed with:  Patient         Postoperative Care    Pain management: IV analgesics, Oral pain medications, Multi-modal analgesia.   PONV prophylaxis: Ondansetron (or other 5HT-3), Dexamethasone or Solumedrol     Comments:                Gifty Jennings MD

## 2022-03-23 NOTE — DISCHARGE INSTRUCTIONS
"HOME CARE FOLLOWING UMBILICAL/VENTRAL HERNIA REPAIR  PRIYANKA James, UZMA Dixon R. O Donnell, ANNA Canela    Special instructions for Michaela Garcia:  --Call office for appointment for drain removal: Friday or Monday depending on output from drain.  --Call office for appointment for removal of staples: approximately 14 days after day of surgery.  --Office phone number: [phone]     DIET:  Start with liquids and gradually resume your regular diet as bowel activity returns and as tolerated.  Increased fluid intake is recommended. While taking pain medications, consider use of a stool softener, increase your fiber in your diet, or add a fiber supplement (like Metamucil, Citrucel) to help prevent constipation - a possible side effect of pain medications.    NAUSEA:  If nauseated from the anesthetic/pain meds; rest in bed, get up cautiously with assistance, and drink clear liquids (juice, tea, broth).    ACTIVITY:  Light Activity -- you may immediately be up and about as tolerated.  Walking is encouraged, increase as tolerated.  Driving/Light Work-- when comfortable and off narcotic pain medications.  Strenuous Work/Activity -- limit lifting to 20 pounds for 6 weeks.  Active Sports (running, biking, etc.) -- cautiously resume after 4 weeks.    INCISIONAL CARE:    If you have a dressing in place, keep clean and dry for 48 hours after surgery.  After this timeframe, you may replace the gauze daily if it becomes soiled.    You may remove the dressing and shower 24 hours after drain removed.  Do not submerse incision in water for 1 week.    Wear binder for comfort    Sutures will absorb and need not be removed.    Skin staples will be removed in 10 to 14 days in the office    Expect a variable amount of swelling/bruising/discoloration that may appear around or below the repair site.    Some numbness around the incision is common.    A lump/\"healing ridge\" under the incision is normal and will " gradually resolve over the following 1-2 months.    Empty drain at least once a day, record output.  Drains are typically removed when less than 30 cc a day.    DISCOMFORT:  Local anesthetic placed at surgery should provide relief for 4-8 hours.  Begin taking pain pills before discomfort is severe.  Take the pain medication with some food, when possible, to minimize side effects.  Intermittent use of ice packs to the hernia repair site may help during the first 1-3 weeks after surgery.  Expect gradual improvement.    Over-the-counter anti-inflammatory medications (i.e. Ibuprofen/Advil/Motrin or Naprosyn/Aleve) may be used per package instructions in addition to or while tapering off the narcotic pain medications to decrease swelling and sensitivity at the repair site.  DO NOT TAKE these Anti-inflammatory medications if your primary physician has advised against doing so, or if you have acid reflux, ulcer, or bleeding disorder, or take blood-thinner medications.  Call your primary physician or the surgery office if you have medication questions.      FOLLOW-UP AFTER SURGERY:  -Our office will contact you approximately 2-3 weeks after surgery to check on your progress and answer any questions you may have.  If you are doing well, you will not need to return for an office appointment.  If any concerns are identified over the phone, we will help you make an appointment to see a provider.    -If you have not received a phone call, have any questions or concerns, or would like to be seen, please call us at [phone].  We are located at: [address], [address]; [place] [address]    -CONTACT US IF THE FOLLOWING DEVELOPS:   1. A fever that is above 101     2. Increased redness, warmth, drainage, bleeding, or swelling.   3. Pain that is not relieved by rest/ice and your prescription.   4.  Increasing pain after 48 hours.   5. Drainage that is thick, cloudy, yellow, green or white.   6. Any other questions or  concerns.      FREQUENTLY ASKED QUESTIONS:    Q:  How should my incision look?    A:  Normally your incision will appear slightly swollen with light redness directly along the incision itself as it heals.  It may feel like a bump or ridge as the healing/scarring happens, and over time (3-4 months) this bump or ridge feeling should slowly go away.  In general, clear or pink watery drainage can be normal at first as your incision heals, but should decrease over time.    Q:  How do I know if my incision is infected?  A:  Look at your incision for signs of infection, like redness around the incision spreading to surrounding skin, or drainage of cloudy or foul-smelling drainage.  If you feel warm, check your temperature to see if you are running a fever.    **If any of these things occur, please notify the nurse at our office.  We may need you to come into the office for an incision check.      Q:  How do I take care of my incision?  A:  If you have a dressing in place - Starting the day after surgery, replace the dressing 1-2 times a day until there is no further drainage from the incision.  At that time, a dressing is no longer needed.  Try to minimize tape on the skin if irritation is occurring at the tape sites.  If you have significant irritation from tape on the skin, please call the office to discuss other method of dressing your incision.  .    Q:  There is a piece of tape or a sticky  lead  still on my skin.  Can I remove this?  A:  Sometimes the sticky  leads  used for monitoring during surgery or for evaluation in the emergency department are not all removed while you are in the hospital.  These sometimes have a tab or metal dot on them.  You can easily remove these on your own, like taking off a band-aid.  If there is a gel substance under the  lead , simply wipe/clean it off with a washcloth or paper towel.      Q:  What can I do to minimize constipation (very hard stools, or lack of stools)?  A:  Stay well  hydrated.  Increase your dietary fiber intake or take a fiber supplement -with plenty of water.  Walk around frequently.  You may consider an over-the-counter stool-softener.  Your Pharmacist can assist you with choosing one that is stocked at your pharmacy.  Constipation is also one of the most common side effects of pain medication.  If you are using pain medication, be pro-active and try to PREVENT problems with constipation by taking the steps above BEFORE constipation becomes a problem.    Q:  What do I do if I need more pain medications?  A:  Call the office to receive refills.  Be aware that certain pain meds cannot be called into a pharmacy and actually require a paper prescription.  A change may be made in your pain med as you progress thru your recovery period or if you have side effects to certain meds.    --Pain meds are NOT refilled after 5pm on weekdays, and NOT AT ALL on the weekends, so please look ahead to prevent problems.    Q:  Why am I having a hard time sleeping now that I am at home?  A:  Many medications you receive while you are in the hospital can impact your sleep for a number of days after your surgery/hospitalization.  Decreased level of activity and naps during the day may also make sleeping at night difficult.  Try to minimize day-time naps, and get up frequently during the day to walk around your home during your recovery time.  Sleep aides may be of some help, but are not recommended for long-term use.      Q:  I am having some back discomfort.  What should I do?  A:  This may be related to certain positioning that was required for your surgery, extended periods of time in bed, or other changes in your overall activity level.  You may try ice, heat, acetaminophen, or ibuprofen to treat this temporarily.  Note that many pain medications have acetaminophen in them and would state this on the prescription bottle.  Be sure not to exceed the maximum of 4000mg per day of acetaminophen.      **If the pain you are having does not resolve, is severe, or is a flare of back pain you have had on other occasions prior to surgery, please contact your primary physician for further recommendations or for an appointment to be examined at their office.    Q:  Why am I having headaches?  A:  Headaches can be caused by many things:  caffeine withdrawal, use of pain meds, dehydration, high blood pressure, lack of sleep, over-activity/exhaustion, flare-up of usual migraine headaches.  If you feel this is related to muscle tension (a band-like feeling around the head, or a pressure at the low-back of the head) you may try ice or heat to this area.  You may need to drink more fluids (try electrolyte drink like Gatorade), rest, or take your usual migraine medications.   **If your headaches do not resolve, worsen, are accompanied by other symptoms, or if your blood pressure is high, please call your primary physician for recommendation and/or examination.    Q:  I am unable to urinate.  What do I do?  A:  A small percentage of people can have difficulty urinating initially after surgery.  This includes being able to urinate only a very small amount at a time and feeling discomfort or pressure in the very low abdomen.  This is called  urinary retention , and is actually an urgent situation.  Proceed to your nearest Emergency department for evaluation (not an Urgent Care Center).  Sometimes the bladder does not work correctly after certain medications you receive during surgery, or related to certain procedures.  You may need to have a catheter placed until your bladder recovers.  When planning to go to an Emergency department, it may help to call the ER to let them know you are coming in for this problem after a surgery.  This may help you get in quicker to be evaluated.  **If you have symptoms of a urinary tract infection, please contact your primary physician for the proper evaluation and treatment.        If you have  other questions, please call the office Monday thru Friday between 8am and 4:30pm to discuss with the nurse or physician assistant.  #[phone]    There is a surgeon ON CALL on weekday evenings and over the weekend in case of urgent need only, and may be contacted at the same number.    If you are having an emergency, call 911 or proceed to your nearest emergency department.

## 2022-03-24 VITALS
BODY MASS INDEX: 28.7 KG/M2 | DIASTOLIC BLOOD PRESSURE: 71 MMHG | SYSTOLIC BLOOD PRESSURE: 122 MMHG | TEMPERATURE: 98.1 F | RESPIRATION RATE: 20 BRPM | WEIGHT: 200 LBS | OXYGEN SATURATION: 92 % | HEART RATE: 65 BPM

## 2022-03-24 LAB
ERYTHROCYTE [DISTWIDTH] IN BLOOD BY AUTOMATED COUNT: 14.1 % (ref 10–15)
HCT VFR BLD AUTO: 36.6 % (ref 35–47)
HGB BLD-MCNC: 11.4 G/DL (ref 11.7–15.7)
MCH RBC QN AUTO: 29.6 PG (ref 26.5–33)
MCHC RBC AUTO-ENTMCNC: 31.1 G/DL (ref 31.5–36.5)
MCV RBC AUTO: 95 FL (ref 78–100)
PLATELET # BLD AUTO: 237 10E3/UL (ref 150–450)
RBC # BLD AUTO: 3.85 10E6/UL (ref 3.8–5.2)
WBC # BLD AUTO: 11.5 10E3/UL (ref 4–11)

## 2022-03-24 PROCEDURE — 258N000003 HC RX IP 258 OP 636: Performed by: SURGERY

## 2022-03-24 PROCEDURE — 85027 COMPLETE CBC AUTOMATED: CPT | Performed by: SURGERY

## 2022-03-24 PROCEDURE — 250N000013 HC RX MED GY IP 250 OP 250 PS 637: Performed by: SURGERY

## 2022-03-24 PROCEDURE — 36415 COLL VENOUS BLD VENIPUNCTURE: CPT | Performed by: SURGERY

## 2022-03-24 RX ORDER — OXYCODONE HYDROCHLORIDE 5 MG/1
5-10 TABLET ORAL EVERY 4 HOURS PRN
Qty: 12 TABLET | Refills: 0 | Status: SHIPPED | OUTPATIENT
Start: 2022-03-24 | End: 2022-03-28

## 2022-03-24 RX ORDER — AMOXICILLIN 250 MG
2 CAPSULE ORAL 2 TIMES DAILY
Qty: 20 TABLET | Refills: 0 | Status: SHIPPED | OUTPATIENT
Start: 2022-03-24 | End: 2022-03-28

## 2022-03-24 RX ORDER — AMOXICILLIN 250 MG
2 CAPSULE ORAL 2 TIMES DAILY
Status: DISCONTINUED | OUTPATIENT
Start: 2022-03-24 | End: 2022-03-24 | Stop reason: HOSPADM

## 2022-03-24 RX ADMIN — ACETAMINOPHEN 650 MG: 325 TABLET, FILM COATED ORAL at 08:27

## 2022-03-24 RX ADMIN — OXYCODONE HYDROCHLORIDE 5 MG: 5 TABLET ORAL at 00:00

## 2022-03-24 RX ADMIN — OXYCODONE HYDROCHLORIDE 5 MG: 5 TABLET ORAL at 08:29

## 2022-03-24 RX ADMIN — SENNOSIDES AND DOCUSATE SODIUM 1 TABLET: 50; 8.6 TABLET ORAL at 08:30

## 2022-03-24 RX ADMIN — LEVOTHYROXINE, LIOTHYRONINE 60 MG: 19; 4.5 TABLET ORAL at 08:27

## 2022-03-24 RX ADMIN — OXYCODONE HYDROCHLORIDE 10 MG: 5 TABLET ORAL at 03:59

## 2022-03-24 RX ADMIN — SODIUM CHLORIDE: 9 INJECTION, SOLUTION INTRAVENOUS at 07:53

## 2022-03-24 NOTE — PROGRESS NOTES
PRIMARY DIAGNOSIS: Hernia repair  OUTPATIENT/OBSERVATION GOALS TO BE MET BEFORE DISCHARGE:  1. ADLs back to baseline: Yes    2. Activity and level of assistance: Ambulating independently.    3. Pain status: Improved-controlled with oral pain medications.    4. Return to near baseline physical activity: Yes     5. Able to ambulate: Yes    6. Bowel sounds present: Yes    7. Tolerating clear liquids.       Discharge Planner Nurse   Safe discharge environment identified: Yes  Barriers to discharge: Yes       Entered by: Stacey Ely 03/24/2022 2:41 PM     Please review provider order for any additional goals.   Nurse to notify provider when observation goals have been met and patient is ready for discharge.

## 2022-03-24 NOTE — PLAN OF CARE
PRIMARY DIAGNOSIS: Umbilical hernia repair  OUTPATIENT/OBSERVATION GOALS TO BE MET BEFORE DISCHARGE:  1. Stable vital signs Yes  2. Tolerating diet:Yes, clear liquids  3. Pain controlled with oral pain medications:  Yes  4. Positive bowel sounds:  Yes  5. Voiding without difficulty:  Yes  6. Able to ambulate:  Yes  7. Provider specific discharge goals met:  No    Discharge Planner Nurse   Safe discharge environment identified: Yes  Barriers to discharge: Yes       Entered by: Klaudia Khan 03/24/2022 12:47 AM     Please review provider order for any additional goals.   Nurse to notify provider when observation goals have been met and patient is ready for discharge.    Pt AO x4. VSS, LS clear, BS active. Pt had pain 4/10, oxycodone given. Pt has AMINTA drain. Up with SBA. Pt has clear liquid diet, tolerating.

## 2022-03-24 NOTE — PROGRESS NOTES
Patient's After Visit Summary was reviewed with patient.   Patient verbalized understanding of After Visit Summary, recommended follow up and was given an opportunity to ask questions.   Discharge medications sent home with patient/family: YES   Discharged with son and daughter via private ride.    Reviewed all belongings from security with patient. Belongings sent with patient.

## 2022-03-24 NOTE — PROGRESS NOTES
[facility]   General Surgery Progress Note           Assessment and Plan:   Assessment:   -Central abd pain, bowel incarcerated in recurrent hernia (incisional)  -POD#1 s/p Incisional hernia repair, primary closure, drain placement, lysis of adhesions; no bowel resection, no mesh  -Postoperative hypoxia, supplemental oxygen, wean as able; h/o EVA      Plan:   -Pain management: acetaminophen, oxycodone  -Diet as tolerated, small meals until +BMs  -Bowel program: senokot BID  -Rx: oxycodone, senokot  -Office follow-up for drain removal; tomorrow if scant output, otherwise Monday in office.  -Discharge instructions entered         Interval History:   Comfortable in bed.  Tolerating diet without nausea/bloating.  No flatus yet.  Minimal activity.  Voiding.  Discussed diet intake, bowel monitoring, also RTC for drain removal when output decreased.           Physical Exam:   Blood pressure 105/56, pulse 60, temperature 97.4  F (36.3  C), temperature source Oral, resp. rate 18, weight 90.7 kg (200 lb), SpO2 97 %.    I/O last 3 completed shifts:  In: 1500 [I.V.:1500]  Out: 50 [Drains:30; Blood:20]    Abdomen:   Abd binder in place, soft, non-distended, tenderness noted near incision and drain site and normal bowel sounds   Midline incision - clean, dry, dressing/staples intact      Avni - serosanguinous, dressing in place.          Data:     Recent Labs   Lab 03/24/22  0542 03/23/22  0944   WBC 11.5* 10.0   HGB 11.4* 14.5   HCT 36.6 45.8   MCV 95 94    314     Recent Labs   Lab 03/23/22  0944      POTASSIUM 4.2   CHLORIDE 107   CO2 29   ANIONGAP 3   *   BUN 13   CR 0.62   GFRESTIMATED >90   MARGOTH 9.6       Krystal Rosa PA-C     Seen and agree,    Christophe Rich MD  [facility]

## 2022-03-24 NOTE — PLAN OF CARE
PRIMARY DIAGNOSIS: Umbilical hernia repair  OUTPATIENT/OBSERVATION GOALS TO BE MET BEFORE DISCHARGE:  1. Tolerating diet: Tolerating clear liquids    2. Pain controlled with oral pain medications:  Yes    3. Positive bowel sounds:  Yes    4. Voiding without difficulty:  Yes    5. Able to ambulate:  Yes    Discharge Planner Nurse   Safe discharge environment identified: Yes  Barriers to discharge: Yes       Entered by: Klaudia Khan 03/24/2022 4:16 AM     Please review provider order for any additional goals.   Nurse to notify provider when observation goals have been met and patient is ready for discharge.    Pt AO x4. VSS, LS clear, BS active. IV infusing  ml/hr. Pt has had oxycodone x2 for pain 5/10. AMINTA drain patent. Pt is clear liquid diet. Up with SBA.

## 2022-03-24 NOTE — PROGRESS NOTES
ROOM # 206-1    Living Situation (if not independent, order SW consult): Home with children  [facility]  : [facility]    Activity level at baseline: Independent  Activity level on admit: SBA    Who will be transporting you at discharge: [facility]    Patient registered to observation; given Patient Bill of Rights; given the opportunity to ask questions about observation status and their plan of care.  Patient has been oriented to the observation room, bathroom and call light is in place.    Discussed discharge goals and expectations with patient/family.

## 2022-03-24 NOTE — PHARMACY-ADMISSION MEDICATION HISTORY
Admission medication history interview status for this patient is complete. See [facility] admission navigator for allergy information, prior to admission medications and immunization status.     Medication history interview done, indicate source(s): Patient  Medication history resources (including written lists, pill bottles, clinic record): SureScripts and Care Everywhere  Pharmacy: [facility]. [facility] for discharge    Changes made to PTA medication list:  Added: -  Changed: -  Reported as Not Taking: -  Removed: duplicate APAP, ibuprofen, naproxen    Actions taken by pharmacist (provider contacted, etc):None     Additional medication history information:None    Medication reconciliation/reorder completed by provider prior to medication history?  Y   (Y/N)     Prior to Admission medications    Medication Sig Last Dose Taking? Auth Provider   acetaminophen (TYLENOL) 325 MG tablet Take 2 tablets (650 mg) by mouth every 4 hours as needed for other (mild pain) 3/22/2022 at AM Yes Jenaro Jewell MD   hydrOXYzine (ATARAX) 25 MG tablet Take 1 tablet (25 mg) by mouth every 6 hours as needed for itching or anxiety (with pain, moderate pain) Past Month at Unknown time Yes Jenaro Jewell MD   Melatonin 10 MG TABS tablet Take 10 mg by mouth nightly as needed for sleep 3/22/2022 at PM Yes Reported, Patient   oxyCODONE (ROXICODONE) 5 MG tablet Take 1-2 tablets (5-10 mg) by mouth every 4 hours as needed for moderate to severe pain  Yes Vasquez Pope MD   senna-docusate (SENOKOT-S/PERICOLACE) 8.6-50 MG tablet Take 1-2 tablets by mouth 2 times daily Take while on oral narcotics to prevent or treat constipation. 3/22/2022 at AM Yes Jenaro Jewell MD   thyroid (ARMOUR) 30 MG tablet Take 60 mg by mouth daily  3/22/2022 at AM Yes Reported, Patient   VITAMIN D, CHOLECALCIFEROL, PO Take 5,000 Units by mouth daily  3/22/2022 at AM Yes Reported, Patient

## 2022-03-25 ENCOUNTER — TELEPHONE (OUTPATIENT)
Dept: SURGERY | Facility: CLINIC | Age: 57
End: 2022-03-25
Payer: COMMERCIAL

## 2022-03-25 LAB
PATH REPORT.COMMENTS IMP SPEC: NORMAL
PATH REPORT.COMMENTS IMP SPEC: NORMAL
PATH REPORT.FINAL DX SPEC: NORMAL
PATH REPORT.GROSS SPEC: NORMAL
PATH REPORT.MICROSCOPIC SPEC OTHER STN: NORMAL
PATH REPORT.RELEVANT HX SPEC: NORMAL
PHOTO IMAGE: NORMAL

## 2022-03-25 PROCEDURE — 88302 TISSUE EXAM BY PATHOLOGIST: CPT | Mod: 26 | Performed by: PATHOLOGY

## 2022-03-25 NOTE — TELEPHONE ENCOUNTER
S/p Incisional Herniorrhaphy without Mesh, lysis of adhesions due to omentum adherent to the hernia sac  Procedure date: 3/23/22  Surgeon: Dr. Pope    Patient is calling for appointment to have drain removed.  Discharge instructions are to have drain removed Friday if scant output or Monday.     Michaela reports she emptied approximately 15 ml last night and 15ml again this morning.      Scheduled for clinic appointment with PA on Monday 3/28/22 at 10am for drain removal.    Clinic address and directions given.

## 2022-03-25 NOTE — TELEPHONE ENCOUNTER
Name of caller: Patient    Reason for Call:  Possible drain pull    Surgeon:  Dr. Pope    Recent Surgery:  Yes.    If yes, when & what type:  3/23/22 Incisional Herniorrhaphy without Mesh, lysis of adhesions due to omentum adherent to the hernia sac      Best phone number to reach pt at is: [phone]  Ok to leave a message with medical info? Yes.    Pharmacy preferred (if calling for a refill): na

## 2022-03-28 ENCOUNTER — OFFICE VISIT (OUTPATIENT)
Dept: SURGERY | Facility: CLINIC | Age: 57
End: 2022-03-28
Payer: COMMERCIAL

## 2022-03-28 VITALS
WEIGHT: 200 LBS | HEIGHT: 70 IN | RESPIRATION RATE: 16 BRPM | HEART RATE: 58 BPM | BODY MASS INDEX: 28.63 KG/M2 | DIASTOLIC BLOOD PRESSURE: 84 MMHG | OXYGEN SATURATION: 99 % | SYSTOLIC BLOOD PRESSURE: 124 MMHG

## 2022-03-28 DIAGNOSIS — Z09 SURGICAL FOLLOWUP VISIT: Primary | ICD-10-CM

## 2022-03-28 PROCEDURE — 99024 POSTOP FOLLOW-UP VISIT: CPT

## 2022-03-28 NOTE — PROGRESS NOTES
3/28/2022    [facility] Clinic Note     Subjective:  Michaela Garcia is here for drain removal. She underwent incisional hernia repair without mesh by Dr. Pope on 3/23. Today she  tells me she has been feeling well since surgery. She currently does not require narcotic pain medications, she is eating a normal diet and her bowels are regular. She has no concerns today. She plans to return to work in 3 days and states she is able to follow her postop activity restrictions at work. She is planning to resume strength training with a , and we discussed that she is recommended to limit lifting until 8 weeks postop because she the hernia was not repaired with mesh.    Objective:  Abd - Abdomen soft, non-tender  Inc - Healing well, well approximated and without signs of infection.  + staples. 1 staple at central incision was removed as it was partially buried under granulation tissue  Drain - scant serous fluid in bulb, removed and gauze/tegaderm dressing placed    Assessment:  S/p incisional hernia repair without mesh    Plan:  OK to shower with waterproof dressing on drain site  Keep drain site covered until no longer draining  RTC 1 week for staple removal      Rosalia Wallace PA-C      Please route or send letter to:  *None*

## 2022-04-04 ENCOUNTER — OFFICE VISIT (OUTPATIENT)
Dept: SURGERY | Facility: CLINIC | Age: 57
End: 2022-04-04
Payer: COMMERCIAL

## 2022-04-04 VITALS
RESPIRATION RATE: 16 BRPM | DIASTOLIC BLOOD PRESSURE: 78 MMHG | HEIGHT: 70 IN | WEIGHT: 200 LBS | OXYGEN SATURATION: 96 % | BODY MASS INDEX: 28.63 KG/M2 | HEART RATE: 55 BPM | SYSTOLIC BLOOD PRESSURE: 126 MMHG

## 2022-04-04 DIAGNOSIS — Z09 FOLLOW-UP EXAMINATION AFTER ABDOMINAL SURGERY: Primary | ICD-10-CM

## 2022-04-04 PROCEDURE — 99024 POSTOP FOLLOW-UP VISIT: CPT

## 2022-04-04 NOTE — PROGRESS NOTES
3/28/2022    [facility] Clinic Note     Subjective:  Michaela Garcia is here for staple removal. She underwent incisional hernia repair without mesh by Dr. Pope on 3/23. She was last seen in clinic 3/30 for drain removal. She has been keeping her incision covered with gauze dressing. She wants to ensure that her wound is not infected due to recent knee replacement 3 months ago.    Objective:  Abd - Abdomen soft, non-tender  Inc - no signs of infection. Staples removed. Single lump of excessive granulation tissue at inferior incision, treated with silver nitrate. There is an open portion of incision (1-2 cm) with necrotic fat noted at the central/intra-umbilical region of incision.  There is serous drainage associated with this. The umbilicus was packed with corner of 4x8 gauze with overlay gauze/tape.       Assessment:  S/p incisional hernia repair without mesh, drain and staples removed  Poor wound healing at central incision    Plan:  Patient was also seen by Dr. Pope at today's visit. No antibiotics are recommended for her hernia incision/wound but patient may contact her orthopedic surgeon for their input regarding antibiotics as well.  OK to shower  Keep incision covered until no longer draining  RTC 10-14 days if not fully healed      Rosalia Wallace PA-C      Please route or send letter to:  *None*

## 2022-11-21 ENCOUNTER — HEALTH MAINTENANCE LETTER (OUTPATIENT)
Age: 57
End: 2022-11-21

## 2023-01-20 NOTE — PATIENT INSTRUCTIONS
MEDICATION CHANGES:  1.  Increase lisinopril from 20 mg to 40 mg daily.  Take in the morning.  2.  Please discontinue Aleve, ibuprofen etc.  These cause increased blood pressure and other major side effects.  3.  Talk to your endocrinologist about the possibility of discontinuing phentermine, due to the long-term risk of pulmonary hypertension.    ADDITIONAL TESTING AND FOLLOW-UP:  1. Cardiac MRI with stress.  2. 14 day Ziopatch monitor.  3.  Sleep clinic referral.  4.  Follow up with test results.    If you have any questions or concerns, please contact my nurses at [phone].    
MD Office

## 2023-04-16 ENCOUNTER — HEALTH MAINTENANCE LETTER (OUTPATIENT)
Age: 58
End: 2023-04-16

## 2023-11-26 ENCOUNTER — HEALTH MAINTENANCE LETTER (OUTPATIENT)
Age: 58
End: 2023-11-26

## 2023-12-20 ENCOUNTER — OFFICE VISIT (OUTPATIENT)
Dept: URGENT CARE | Facility: URGENT CARE | Age: 58
End: 2023-12-20
Payer: OTHER MISCELLANEOUS

## 2023-12-20 ENCOUNTER — ANCILLARY PROCEDURE (OUTPATIENT)
Dept: GENERAL RADIOLOGY | Facility: CLINIC | Age: 58
End: 2023-12-20
Attending: PHYSICIAN ASSISTANT
Payer: COMMERCIAL

## 2023-12-20 VITALS
DIASTOLIC BLOOD PRESSURE: 85 MMHG | BODY MASS INDEX: 43.05 KG/M2 | OXYGEN SATURATION: 99 % | TEMPERATURE: 98.3 F | WEIGHT: 293 LBS | HEART RATE: 65 BPM | SYSTOLIC BLOOD PRESSURE: 174 MMHG

## 2023-12-20 DIAGNOSIS — R07.81 RIB PAIN ON LEFT SIDE: ICD-10-CM

## 2023-12-20 DIAGNOSIS — R07.81 RIB PAIN ON LEFT SIDE: Primary | ICD-10-CM

## 2023-12-20 PROCEDURE — 99203 OFFICE O/P NEW LOW 30 MIN: CPT | Performed by: PHYSICIAN ASSISTANT

## 2023-12-20 PROCEDURE — 71101 X-RAY EXAM UNILAT RIBS/CHEST: CPT | Mod: TC | Performed by: RADIOLOGY

## 2023-12-20 RX ORDER — DICLOFENAC SODIUM 75 MG/1
75 TABLET, DELAYED RELEASE ORAL 2 TIMES DAILY
Qty: 15 TABLET | Refills: 0 | Status: SHIPPED | OUTPATIENT
Start: 2023-12-20

## 2023-12-20 NOTE — PROGRESS NOTES
Assessment & Plan     1. Rib pain on left side  Patient presents to the clinic for evaluation of rib pain after a fall forward days ago.  On examination she is tender of the left lower rib in the anterior portion, no bruising, swelling or erythema noted.  No crepitus felt.  X-rays negative per my read.  I suspect the patient has a rib contusion.  Encouraged using diclofenac for pain along with ice.  Discussed indications for follow-up.  - XR Ribs & Chest Left G/E 3 Views; Future  - diclofenac (VOLTAREN) 75 MG EC tablet; Take 1 tablet (75 mg) by mouth 2 times daily  Dispense: 15 tablet; Refill: 0        Return in about 2 weeks (around 1/3/2024), or if symptoms worsen or fail to improve.    Diagnosis and treatment plan was reviewed with patient and/or family.   We went over any labs or imaging. Discussed worsening symptoms or little to no relief despite treatment plan to follow-up with PCP or return to clinic.  Patient verbalizes understanding. All questions were addressed and answered.     Teri Mehta PA-C  [facility]    CHIEF COMPLAINT:   Chief Complaint   Patient presents with    Urgent Care     Pt states that she injured her left rib cage at work on 12/16/2023--Worker's Comp.     Jayy Jennings is a 58 year old female who presents to clinic today for evaluation of left sided rib pain.  Symptoms started 4 days ago, she was bending over to place something in the [employer] been and hit the left lower rib.  She had immediate pain, and heard a popping.  She has had pain since that time.   Patient denies having fever, chills, numbness.      Past Medical History:   Diagnosis Date    Arthritis     both knees    Hashimoto's disease     History of blood transfusion 1999    Hyperglycemia     Obesity     EVA (obstructive sleep apnea)     Palpitations     Sleep apnea     CPAP    Ulcerative colitis (H)     Uncomplicated asthma     assoc with cats, dust and really cold weather     Past Surgical  History:   Procedure Laterality Date    ARTHROPLASTY KNEE Right 10/18/2021    Procedure: Right total knee arthroplasty using an Arthrex iBalance knee system;  Surgeon: Jenaro Jewell MD;  Location: [facility] OR    CHOLECYSTECTOMY  2006    ENT SURGERY  1994    sinus with T&A    GYN SURGERY  1999    D&C    HERNIORRHAPHY UMBILICAL N/A 3/23/2022    Procedure: INCISIONAL HERNIA REPAIR;  Surgeon: Vasquez Pope MD;  Location: [facility] OR     Social History     Tobacco Use    Smoking status: Never    Smokeless tobacco: Never   Substance Use Topics    Alcohol use: Yes     Comment: maybe once a month      Current Outpatient Medications   Medication    acetaminophen (TYLENOL) 325 MG tablet    diclofenac (VOLTAREN) 75 MG EC tablet    hydrOXYzine (ATARAX) 25 MG tablet    Melatonin 10 MG TABS tablet    thyroid (ARMOUR) 30 MG tablet    VITAMIN D, CHOLECALCIFEROL, PO     No current facility-administered medications for this visit.     Allergies   Allergen Reactions    Cats     Dust Mites     Erythromycin Nausea       10 point ROS of systems were all negative except for pertinent positives noted in my HPI.      Exam:   BP (!) 174/85 (BP Location: Right arm, Patient Position: Sitting, Cuff Size: Adult Regular)   Pulse 65   Temp 98.3  F (36.8  C) (Oral)   Wt 136.1 kg (300 lb)   SpO2 99%   BMI 43.05 kg/m    Physical Exam  Constitutional:       Appearance: Normal appearance.   Cardiovascular:      Rate and Rhythm: Normal rate and regular rhythm.   Pulmonary:      Effort: Pulmonary effort is normal.      Breath sounds: Normal breath sounds.   Chest:      Comments: Chest wall tenderness over the left lower rib. No bruising or swelling or erythema noted. Pain made worse with twisting.   Neurological:      Mental Status: She is alert.       XR -- No acute abnormality per my read, pending radiology report.

## 2023-12-20 NOTE — PATIENT INSTRUCTIONS
No acute fracture or dislocation seen on x-ray.   I suspect that you have a contusion.   Diclofenac for pain  Use ice on the area  Use   Follow-up in 2 weeks if pain not improved

## 2024-01-12 ENCOUNTER — OFFICE VISIT (OUTPATIENT)
Dept: URGENT CARE | Facility: URGENT CARE | Age: 59
End: 2024-01-12
Payer: COMMERCIAL

## 2024-01-12 ENCOUNTER — ANCILLARY PROCEDURE (OUTPATIENT)
Dept: GENERAL RADIOLOGY | Facility: CLINIC | Age: 59
End: 2024-01-12
Attending: PHYSICIAN ASSISTANT
Payer: COMMERCIAL

## 2024-01-12 VITALS
DIASTOLIC BLOOD PRESSURE: 94 MMHG | SYSTOLIC BLOOD PRESSURE: 174 MMHG | OXYGEN SATURATION: 97 % | TEMPERATURE: 97.6 F | HEART RATE: 69 BPM

## 2024-01-12 DIAGNOSIS — S22.32XA CLOSED FRACTURE OF ONE RIB OF LEFT SIDE, INITIAL ENCOUNTER: ICD-10-CM

## 2024-01-12 DIAGNOSIS — R07.81 RIB PAIN ON LEFT SIDE: Primary | ICD-10-CM

## 2024-01-12 DIAGNOSIS — R07.81 RIB PAIN ON LEFT SIDE: ICD-10-CM

## 2024-01-12 PROCEDURE — 71101 X-RAY EXAM UNILAT RIBS/CHEST: CPT | Mod: TC | Performed by: RADIOLOGY

## 2024-01-12 PROCEDURE — 99214 OFFICE O/P EST MOD 30 MIN: CPT | Performed by: PHYSICIAN ASSISTANT

## 2024-01-12 RX ORDER — LEVOTHYROXINE, LIOTHYRONINE 38; 9 UG/1; UG/1
1 TABLET ORAL
COMMUNITY
Start: 2024-01-01

## 2024-01-12 RX ORDER — HYDROCODONE BITARTRATE AND ACETAMINOPHEN 5; 325 MG/1; MG/1
1 TABLET ORAL EVERY 6 HOURS PRN
Qty: 6 TABLET | Refills: 0 | Status: SHIPPED | OUTPATIENT
Start: 2024-01-12 | End: 2024-01-15

## 2024-01-12 RX ORDER — DICLOFENAC SODIUM 75 MG/1
75 TABLET, DELAYED RELEASE ORAL 2 TIMES DAILY
Qty: 20 TABLET | Refills: 0 | Status: SHIPPED | OUTPATIENT
Start: 2024-01-12

## 2024-01-12 NOTE — PROGRESS NOTES
Assessment & Plan     1. Rib pain on left side  - XR Ribs & Chest Left G/E 3 Views; Future  - diclofenac (VOLTAREN) 75 MG EC tablet; Take 1 tablet (75 mg) by mouth 2 times daily  Dispense: 20 tablet; Refill: 0    2. Closed fracture of one rib of left side, initial encounter  - HYDROcodone-acetaminophen (NORCO) 5-325 MG tablet; Take 1 tablet by mouth every 6 hours as needed for pain  Dispense: 6 tablet; Refill: 0    Fractures no noted on the left side, although the age is indeterminate, she reports this time her rib pain is much worse than the last time.  Treatment with diclofenac, Norco given for severe pain.  Blackbox warning discussed.    Return in about 1 week (around 1/19/2024), or if symptoms worsen or fail to improve.    Diagnosis and treatment plan was reviewed with patient and/or family.   We went over any labs or imaging. Discussed worsening symptoms or little to no relief despite treatment plan to follow-up with PCP or return to clinic.  Patient verbalizes understanding. All questions were addressed and answered.     Teri Mehta PA-C  [facility]    CHIEF COMPLAINT:   Chief Complaint   Patient presents with    Fall     It happen on Monday she on fell on the left side it hurt under breast      Subjective     Michaela is a 58 year old female who presents to clinic today for evaluation of left rib pain. On 1/8/24, patient fell in the parking lot and landed on her left side. She had some pain when she fell, but pain has been worsening. Feels some shortness of breath.      She did have a injury to her chest wall approximately 1 month ago, x-ray at that time was negative.      Past Medical History:   Diagnosis Date    Arthritis     both knees    Hashimoto's disease     History of blood transfusion 1999    Hyperglycemia     Obesity     EVA (obstructive sleep apnea)     Palpitations     Sleep apnea     CPAP    Ulcerative colitis (H)     Uncomplicated asthma     assoc with cats, dust  and really cold weather     Past Surgical History:   Procedure Laterality Date    ARTHROPLASTY KNEE Right 10/18/2021    Procedure: Right total knee arthroplasty using an Arthrex iBalance knee system;  Surgeon: Jenaro Jewell MD;  Location: [facility]    CHOLECYSTECTOMY  2006    ENT SURGERY  1994    sinus with T&A    GYN SURGERY  1999    D&C    HERNIORRHAPHY UMBILICAL N/A 3/23/2022    Procedure: INCISIONAL HERNIA REPAIR;  Surgeon: Vasquez Pope MD;  Location: [facility] OR     Social History     Tobacco Use    Smoking status: Never    Smokeless tobacco: Never   Substance Use Topics    Alcohol use: Yes     Comment: maybe once a month      Current Outpatient Medications   Medication    acetaminophen (TYLENOL) 325 MG tablet    diclofenac (VOLTAREN) 75 MG EC tablet    HYDROcodone-acetaminophen (NORCO) 5-325 MG tablet    NP THYROID 60 MG tablet    thyroid (ARMOUR) 30 MG tablet    VITAMIN D, CHOLECALCIFEROL, PO    diclofenac (VOLTAREN) 75 MG EC tablet    hydrOXYzine (ATARAX) 25 MG tablet    Melatonin 10 MG TABS tablet     No current facility-administered medications for this visit.     Allergies   Allergen Reactions    Cats     Dust Mites     Erythromycin Nausea       10 point ROS of systems were all negative except for pertinent positives noted in my HPI.      Exam:   BP (!) 174/94   Pulse 69   Temp 97.6  F (36.4  C) (Tympanic)   SpO2 97%   Constitutional: healthy, alert and no distress  Head: Normocephalic, atraumatic.  Chest Wall: TTP on the left side of ribs in the anterior and lateral aspect. No bruising or swelling noted. No crepitus.   Cardiovascular: RRR  Respiratory: CTA bilaterally, no rhonchi or rales  Skin: no rashes  Neurologic: Speech clear, gait normal. Moves all extremities.    Results for orders placed or performed in visit on 01/12/24   XR Ribs & Chest Left G/E 3 Views     Status: None    Narrative    XR RIBS AND CHEST LEFT 3 VIEWS 1/12/2024 2:12 PM     HISTORY: R/O rib fracture, pneumothorax etc  after a fall; Rib pain on  left side  COMPARISON: 12/20/2023      Impression    IMPRESSION: There is subtle cortical irregularity at the anterior left  10th rib; cannot exclude an age-indeterminate nondisplaced fracture.  The lungs appear clear. No apparent pneumothorax or pleural effusion.    LINDA FLORES MD         SYSTEM ID:  [id number]

## 2024-06-23 ENCOUNTER — HEALTH MAINTENANCE LETTER (OUTPATIENT)
Age: 59
End: 2024-06-23

## 2024-07-27 ENCOUNTER — OFFICE VISIT (OUTPATIENT)
Dept: URGENT CARE | Facility: URGENT CARE | Age: 59
End: 2024-07-27
Payer: COMMERCIAL

## 2024-07-27 VITALS
DIASTOLIC BLOOD PRESSURE: 81 MMHG | TEMPERATURE: 97.8 F | SYSTOLIC BLOOD PRESSURE: 143 MMHG | BODY MASS INDEX: 46.35 KG/M2 | RESPIRATION RATE: 20 BRPM | WEIGHT: 293 LBS | HEART RATE: 77 BPM | OXYGEN SATURATION: 96 %

## 2024-07-27 DIAGNOSIS — H00.021 HORDEOLUM INTERNUM OF RIGHT UPPER EYELID: Primary | ICD-10-CM

## 2024-07-27 PROCEDURE — 99213 OFFICE O/P EST LOW 20 MIN: CPT | Performed by: FAMILY MEDICINE

## 2024-07-27 RX ORDER — CEPHALEXIN 500 MG/1
500 CAPSULE ORAL 3 TIMES DAILY
Qty: 21 CAPSULE | Refills: 0 | Status: SHIPPED | OUTPATIENT
Start: 2024-07-27 | End: 2024-08-03

## 2024-07-27 NOTE — PATIENT INSTRUCTIONS
Place warmth over the closed right upper and lower eyelids for 10 minutes at a time, every 2 hours while awake.      Follow up if not better once you return from your vacation.

## 2024-07-27 NOTE — PROGRESS NOTES
SUBJECTIVE:   Michaela Johnson is a 59 year old female presenting with a chief complaint of five days of right upper eyelid tenderness and a lump at the lateral aspect of the right upper eyelid..    Patient also has noticed a tender lump at the right upper eyelid  and whitish-yellowish goopy discharge from the right eye for the past three days.        No right eyeball pain  No decreased vision  No photophobia.   No problems moving the eyeballs.      Past Medical History:   Diagnosis Date    Arthritis     both knees    Hashimoto's disease     History of blood transfusion 1999    Hyperglycemia     Obesity     EVA (obstructive sleep apnea)     Palpitations     Sleep apnea     CPAP    Ulcerative colitis (H)     Uncomplicated asthma     assoc with cats, dust and really cold weather     Current Outpatient Medications   Medication Sig Dispense Refill    NP THYROID 60 MG tablet Take 1 tablet by mouth daily at 2 pm      VITAMIN D, CHOLECALCIFEROL, PO Take 5,000 Units by mouth daily       acetaminophen (TYLENOL) 325 MG tablet Take 2 tablets (650 mg) by mouth every 4 hours as needed for other (mild pain) (Patient not taking: Reported on 7/27/2024) 100 tablet 0    diclofenac (VOLTAREN) 75 MG EC tablet Take 1 tablet (75 mg) by mouth 2 times daily (Patient not taking: Reported on 7/27/2024) 20 tablet 0    diclofenac (VOLTAREN) 75 MG EC tablet Take 1 tablet (75 mg) by mouth 2 times daily (Patient not taking: Reported on 1/12/2024) 15 tablet 0    hydrOXYzine (ATARAX) 25 MG tablet Take 1 tablet (25 mg) by mouth every 6 hours as needed for itching or anxiety (with pain, moderate pain) (Patient not taking: Reported on 1/12/2024) 30 tablet 0    Melatonin 10 MG TABS tablet Take 10 mg by mouth nightly as needed for sleep (Patient not taking: Reported on 1/12/2024)      thyroid (ARMOUR) 30 MG tablet Take 60 mg by mouth daily  (Patient not taking: Reported on 7/27/2024)       Social History     Tobacco Use    Smoking status: Never     Smokeless tobacco: Never   Substance Use Topics    Alcohol use: Yes     Comment: maybe once a month        ROS:  CONSTITUTIONAL:negative for fevers.    EYES: POSITIVE for tender lump at the lateral aspect of the right upper eyelid.      OBJECTIVE:  BP (!) 143/81   Pulse 77   Temp 97.8  F (36.6  C)   Resp 20   Wt 146.5 kg (323 lb)   SpO2 96%   BMI 46.35 kg/m    GENERAL APPEARANCE: healthy, alert and no distress  EYES: there is a small internal hordeolum at the lateral aspect of the right upper eyelid.  The lateral aspect of the lower eyelid has a possible internal hordeolum.  No discharge was seen at the right eye.  The sclera and conjunctiva are within normal limits.  Pupils equally round and reactive to light. extraocular movements intact     ASSESSMENT:  Internal hordeolum of the right upper eyelid.      PLAN:  Apply warmth to the lesions on the right upper eyelid and right lower eyelid.     Rx:  Cephalexin  See orders in Epic    Follow up if not better after returning from vacation (She will be leaving for vacation next week.)    Miguelito King MD

## 2024-08-21 ENCOUNTER — OFFICE VISIT (OUTPATIENT)
Dept: URGENT CARE | Facility: URGENT CARE | Age: 59
End: 2024-08-21
Payer: COMMERCIAL

## 2024-08-21 ENCOUNTER — NURSE TRIAGE (OUTPATIENT)
Dept: PEDIATRICS | Facility: CLINIC | Age: 59
End: 2024-08-21
Payer: COMMERCIAL

## 2024-08-21 VITALS
OXYGEN SATURATION: 96 % | WEIGHT: 293 LBS | DIASTOLIC BLOOD PRESSURE: 87 MMHG | HEART RATE: 66 BPM | TEMPERATURE: 98.3 F | BODY MASS INDEX: 45.7 KG/M2 | SYSTOLIC BLOOD PRESSURE: 153 MMHG | RESPIRATION RATE: 20 BRPM

## 2024-08-21 DIAGNOSIS — R19.7 DIARRHEA, UNSPECIFIED TYPE: Primary | ICD-10-CM

## 2024-08-21 PROBLEM — J45.20 ASTHMA, MILD INTERMITTENT: Status: ACTIVE | Noted: 2024-08-21

## 2024-08-21 PROBLEM — R73.03 PREDIABETES: Status: ACTIVE | Noted: 2024-08-21

## 2024-08-21 LAB
ALBUMIN SERPL-MCNC: 3.6 G/DL (ref 3.4–5)
ALBUMIN UR-MCNC: NEGATIVE MG/DL
ALP SERPL-CCNC: 89 U/L (ref 40–150)
ALT SERPL W P-5'-P-CCNC: 20 U/L (ref 0–50)
AMYLASE SERPL-CCNC: 44 U/L (ref 28–100)
ANION GAP SERPL CALCULATED.3IONS-SCNC: 8 MMOL/L (ref 3–14)
APPEARANCE UR: CLEAR
AST SERPL W P-5'-P-CCNC: 28 U/L (ref 0–45)
BILIRUB SERPL-MCNC: 0.5 MG/DL (ref 0.2–1.3)
BILIRUB UR QL STRIP: NEGATIVE
BUN SERPL-MCNC: 9 MG/DL (ref 7–30)
CALCIUM SERPL-MCNC: 9.9 MG/DL (ref 8.5–10.1)
CHLORIDE BLD-SCNC: 106 MMOL/L (ref 94–109)
CO2 SERPL-SCNC: 31 MMOL/L (ref 20–32)
COLOR UR AUTO: YELLOW
CREAT SERPL-MCNC: 0.9 MG/DL (ref 0.52–1.04)
EGFRCR SERPLBLD CKD-EPI 2021: 73 ML/MIN/1.73M2
ERYTHROCYTE [DISTWIDTH] IN BLOOD BY AUTOMATED COUNT: 14.1 % (ref 10–15)
GLUCOSE BLD-MCNC: 108 MG/DL (ref 70–99)
GLUCOSE UR STRIP-MCNC: NEGATIVE MG/DL
HCT VFR BLD AUTO: 44 % (ref 35–47)
HGB BLD-MCNC: 13.9 G/DL (ref 11.7–15.7)
HGB UR QL STRIP: NEGATIVE
KETONES UR STRIP-MCNC: NEGATIVE MG/DL
LEUKOCYTE ESTERASE UR QL STRIP: NEGATIVE
LIPASE SERPL-CCNC: 25 U/L (ref 13–60)
MCH RBC QN AUTO: 27.9 PG (ref 26.5–33)
MCHC RBC AUTO-ENTMCNC: 31.6 G/DL (ref 31.5–36.5)
MCV RBC AUTO: 88 FL (ref 78–100)
NITRATE UR QL: NEGATIVE
PH UR STRIP: 6 [PH] (ref 5–7)
PLATELET # BLD AUTO: 277 10E3/UL (ref 150–450)
POTASSIUM BLD-SCNC: 4.3 MMOL/L (ref 3.4–5.3)
PROT SERPL-MCNC: 7.8 G/DL (ref 6.8–8.8)
RBC # BLD AUTO: 4.98 10E6/UL (ref 3.8–5.2)
SODIUM SERPL-SCNC: 145 MMOL/L (ref 135–145)
SP GR UR STRIP: 1.01 (ref 1–1.03)
UROBILINOGEN UR STRIP-ACNC: 0.2 E.U./DL
WBC # BLD AUTO: 9.3 10E3/UL (ref 4–11)

## 2024-08-21 PROCEDURE — 87507 IADNA-DNA/RNA PROBE TQ 12-25: CPT | Performed by: PHYSICIAN ASSISTANT

## 2024-08-21 PROCEDURE — 85027 COMPLETE CBC AUTOMATED: CPT | Performed by: PHYSICIAN ASSISTANT

## 2024-08-21 PROCEDURE — 87493 C DIFF AMPLIFIED PROBE: CPT | Performed by: PHYSICIAN ASSISTANT

## 2024-08-21 PROCEDURE — 87324 CLOSTRIDIUM AG IA: CPT | Performed by: PHYSICIAN ASSISTANT

## 2024-08-21 PROCEDURE — 36415 COLL VENOUS BLD VENIPUNCTURE: CPT | Performed by: PHYSICIAN ASSISTANT

## 2024-08-21 PROCEDURE — 99214 OFFICE O/P EST MOD 30 MIN: CPT | Performed by: PHYSICIAN ASSISTANT

## 2024-08-21 PROCEDURE — 81003 URINALYSIS AUTO W/O SCOPE: CPT | Performed by: PHYSICIAN ASSISTANT

## 2024-08-21 PROCEDURE — 82150 ASSAY OF AMYLASE: CPT | Performed by: PHYSICIAN ASSISTANT

## 2024-08-21 PROCEDURE — 80053 COMPREHEN METABOLIC PANEL: CPT | Performed by: PHYSICIAN ASSISTANT

## 2024-08-21 PROCEDURE — 83690 ASSAY OF LIPASE: CPT | Performed by: PHYSICIAN ASSISTANT

## 2024-08-21 RX ORDER — ONDANSETRON 4 MG/1
4 TABLET, ORALLY DISINTEGRATING ORAL EVERY 8 HOURS PRN
Qty: 10 TABLET | Refills: 0 | Status: SHIPPED | OUTPATIENT
Start: 2024-08-21

## 2024-08-21 NOTE — PROGRESS NOTES
Assessment & Plan     1. Diarrhea, unspecified type  Patient with protracted diarrhea for the past several weeks.  On exam her vital signs are stable.  Abdomen is soft without rebound, guarding or rigidity.  Rest of examination is normal.  No laboratory evidence of leukocytosis, anemia, electrolyte abnormality, renal or liver dysfunction etc.  I will have her take small sips of fluids frequently.  Brat diet.  Zofran can be used for nausea.  Stool cultures were ordered for her to clinic for further evaluation.  We discussed follow-up with PCP if diarrhea continues and all stool negative.  May need GI if diarrhea continues.   - CBC with platelets; Future  - Comprehensive metabolic panel; Future  - Lipase; Future  - Amylase; Future  - UA Macroscopic with reflex to Microscopic and Culture - Clinic Collect  - Enteric Bacteria and Virus Panel by [facility] Stool; Future  - C. difficile Toxin B PCR with reflex to C. difficile Antigen and Toxins A/B EIA; Future  - CBC with platelets  - Comprehensive metabolic panel  - Lipase  - Amylase  - Enteric Bacteria and Virus Panel by [facility] Stool  - C. difficile Toxin B PCR with reflex to C. difficile Antigen and Toxins A/B EIA  - ondansetron (ZOFRAN ODT) 4 MG ODT tab; Take 1 tablet (4 mg) by mouth every 8 hours as needed for nausea.  Dispense: 10 tablet; Refill: 0      TRINA Trevino [facility]    CHIEF COMPLAINT:   Chief Complaint   Patient presents with    Diarrhea     2 weeks on and off but worse yesterday, nausea, abdominal cramping     Jayy Jennings is a 59 year old female who presents to clinic today for evaluation of nausea and diarrhea. Symptoms started about 2 weeks ago. Initially, she had loose stools, heart burn and indigestion.   Yesterday, symptoms worsened with explosive diarrhea which was very watery. She took imodium last night, and continues to have loose stools. NO blood is noted in stool.   Diarrhea is not worse after eating.  Heartburn is sometimes better after eating. She has used pepto bismal and zantac for her symptoms. Endorses having abdominal pain throughout the abdomen. This waxes and wanes in severity.   No fever noted. No vomiting.   She does take ibuprofen and tylenol before bed to help with her shoulder pain.     She was on antibiotics at the end of July (3-4 weeks ago) -- Keflex.   [place] did [place] the first week in August.     Hx of UC (12 years ago), but a follow-up colonoscopy approximately one year later was completely clear.         Past Medical History:   Diagnosis Date    Arthritis     both knees    Hashimoto's disease     History of blood transfusion 1999    Hyperglycemia     Obesity     EVA (obstructive sleep apnea)     Palpitations     Sleep apnea     CPAP    Ulcerative colitis (H)     Uncomplicated asthma     assoc with cats, dust and really cold weather     Past Surgical History:   Procedure Laterality Date    ARTHROPLASTY KNEE Right 10/18/2021    Procedure: Right total knee arthroplasty using an Arthrex FreeDrivelance knee system;  Surgeon: Jenaro Jewell MD;  Location: [facility]    CHOLECYSTECTOMY  2006    ENT SURGERY  1994    sinus with T&A    GYN SURGERY  1999    D&C    HERNIORRHAPHY UMBILICAL N/A 3/23/2022    Procedure: INCISIONAL HERNIA REPAIR;  Surgeon: Vasquez Pope MD;  Location: [facility] OR     Social History     Tobacco Use    Smoking status: Never    Smokeless tobacco: Never   Substance Use Topics    Alcohol use: Yes     Comment: maybe once a month      Current Outpatient Medications   Medication Sig Dispense Refill    NP THYROID 60 MG tablet Take 1 tablet by mouth daily at 2 pm      VITAMIN D, CHOLECALCIFEROL, PO Take 5,000 Units by mouth daily       acetaminophen (TYLENOL) 325 MG tablet Take 2 tablets (650 mg) by mouth every 4 hours as needed for other (mild pain) (Patient not taking: Reported on 7/27/2024) 100 tablet 0    diclofenac (VOLTAREN) 75 MG EC tablet Take 1 tablet (75 mg) by mouth 2  times daily (Patient not taking: Reported on 7/27/2024) 20 tablet 0    diclofenac (VOLTAREN) 75 MG EC tablet Take 1 tablet (75 mg) by mouth 2 times daily (Patient not taking: Reported on 1/12/2024) 15 tablet 0    hydrOXYzine (ATARAX) 25 MG tablet Take 1 tablet (25 mg) by mouth every 6 hours as needed for itching or anxiety (with pain, moderate pain) (Patient not taking: Reported on 1/12/2024) 30 tablet 0    Melatonin 10 MG TABS tablet Take 10 mg by mouth nightly as needed for sleep (Patient not taking: Reported on 1/12/2024)      thyroid (ARMOUR) 30 MG tablet Take 60 mg by mouth daily  (Patient not taking: Reported on 7/27/2024)       No current facility-administered medications for this visit.     Allergies   Allergen Reactions    Cats     Dust Mites     Erythromycin Nausea       10 point ROS of systems were all negative except for pertinent positives noted in my HPI.      Exam:   BP (!) 153/87   Pulse 66   Temp 98.3  F (36.8  C)   Resp 20   Wt 144.5 kg (318 lb 8 oz)   SpO2 96%   BMI 45.70 kg/m    Constitutional: healthy, alert and no distress  Head: Normocephalic, atraumatic.  Eyes: conjunctiva clear, no drainage  ENT: MMM. nasal mucosa pink and moist, throat without tonsillar hypertrophy or erythema  Neck: neck is supple, no cervical lymphadenopathy or nuchal rigidity  Cardiovascular: RRR  Respiratory: CTA bilaterally, no rhonchi or rales  Gastrointestinal: soft and nontender.  No rebound, guarding or rigidity.  Skin: no rashes  Neurologic: Speech clear, gait normal. Moves all extremities.    Results for orders placed or performed in visit on 08/21/24   UA Macroscopic with reflex to Microscopic and Culture - Clinic Collect     Status: Normal    Specimen: Urine, Clean Catch   Result Value Ref Range    Color Urine Yellow Colorless, Straw, Light Yellow, Yellow    Appearance Urine Clear Clear    Glucose Urine Negative Negative mg/dL    Bilirubin Urine Negative Negative    Ketones Urine Negative Negative mg/dL     Specific Gravity Urine 1.010 1.003 - 1.035    Blood Urine Negative Negative    pH Urine 6.0 5.0 - 7.0    Protein Albumin Urine Negative Negative mg/dL    Urobilinogen Urine 0.2 0.2, 1.0 E.U./dL    Nitrite Urine Negative Negative    Leukocyte Esterase Urine Negative Negative    Narrative    Microscopic not indicated   CBC with platelets     Status: Normal   Result Value Ref Range    WBC Count 9.3 4.0 - 11.0 10e3/uL    RBC Count 4.98 3.80 - 5.20 10e6/uL    Hemoglobin 13.9 11.7 - 15.7 g/dL    Hematocrit 44.0 35.0 - 47.0 %    MCV 88 78 - 100 fL    MCH 27.9 26.5 - 33.0 pg    MCHC 31.6 31.5 - 36.5 g/dL    RDW 14.1 10.0 - 15.0 %    Platelet Count 277 150 - 450 10e3/uL   Comprehensive metabolic panel     Status: Abnormal   Result Value Ref Range    Sodium 145 135 - 145 mmol/L    Potassium 4.3 3.4 - 5.3 mmol/L    Chloride 106 94 - 109 mmol/L    Carbon Dioxide (CO2) 31 20 - 32 mmol/L    Anion Gap 8 3 - 14 mmol/L    Urea Nitrogen 9 7 - 30 mg/dL    Creatinine 0.90 0.52 - 1.04 mg/dL    GFR Estimate 73 >60 mL/min/1.73m2    Calcium 9.9 8.5 - 10.1 mg/dL    Glucose 108 (H) 70 - 99 mg/dL    Alkaline Phosphatase 89 40 - 150 U/L    AST 28 0 - 45 U/L    ALT 20 0 - 50 U/L    Protein Total 7.8 6.8 - 8.8 g/dL    Albumin 3.6 3.4 - 5.0 g/dL    Bilirubin Total 0.5 0.2 - 1.3 mg/dL

## 2024-08-21 NOTE — PATIENT INSTRUCTIONS
Your labs are all reassuring  Please return your stool cultures to the clinic  I have prescribed zofran that you can use for nausea  Small sips of fluids frequently  BRAT diet

## 2024-08-21 NOTE — TELEPHONE ENCOUNTER
"S-(situation): Received call from patient, inquiring if she should be seen regarding diarrhea.     B-(background): Patient reports she has had loose stools for the past couple weeks, seems to be worse since yesterday.     Patient notes she was on vacation, road trip to [place], first week of august. When she got back she started a new job. Patient thought diarrhea was associated to travel and then anxiety of starting new job. Patient also notes she was on ABX at the end of July for a sty in her eye.     A-(assessment): Patient experiencing watery diarrhea, 5-6 BMs in past 24 hrs. Watery stools started yesterday, prior to yesterday stools were loose. Patient feels nauseous, no vomiting. No fever. Patient also experiencing abdominal pain, sometimes below belly button, but now in middle of stomach. Feels this has been constant for past 2 hrs. \"Ebbs and flows\" in severity, rating as 3 or 4/10. Described as dull.     R-(recommendations): Per protocol, second level advised. No PCP (listed PCP is from 5 years ago). RN advised visit today, UC to be evaluated further. Patient plans to go to UC today.       Reason for Disposition   Constant abdominal pain lasting > 2 hours    Additional Information   Negative: Shock suspected (e.g., cold/pale/clammy skin, too weak to stand, low BP, rapid pulse)   Negative: Difficult to awaken or acting confused (e.g., disoriented, slurred speech)   Negative: Sounds like a life-threatening emergency to the triager   Negative: Vomiting also present and worse than the diarrhea   Negative: Blood in stool and without diarrhea   Negative: SEVERE abdominal pain (e.g., excruciating) and present > 1 hour   Negative: SEVERE abdominal pain and age > 60 years   Negative: Bloody, black, or tarry bowel movements  (Exception: Chronic-unchanged black-grey bowel movements and is taking iron pills or Pepto-Bismol.)   Negative: SEVERE diarrhea (e.g., 7 or more times / day more than normal) and age > 60 " "years    Answer Assessment - Initial Assessment Questions  1. DIARRHEA SEVERITY: \"How bad is the diarrhea?\" \"How many more stools have you had in the past 24 hours than normal?\"     - NO DIARRHEA (SCALE 0)    - MILD (SCALE 1-3): Few loose or mushy BMs; increase of 1-3 stools over normal daily number of stools; mild increase in ostomy output.    -  MODERATE (SCALE 4-7): Increase of 4-6 stools daily over normal; moderate increase in ostomy output.    -  SEVERE (SCALE 8-10; OR \"WORST POSSIBLE\"): Increase of 7 or more stools daily over normal; moderate increase in ostomy output; incontinence.      Within past 24 hrs, patient reports 5 or 6 BMs. Feels like everytime she sits down on toilet, she has diarrhea.   2. ONSET: \"When did the diarrhea begin?\"       Yesterday afternoon. Has had looser stools for the past couple weeks. Was recently on vacation (first week of August) and stools were loose. Started new job when she got back from vacation (thought diarrhea was anxiety). Started to get worse yesterday.   3. BM CONSISTENCY: \"How loose or watery is the diarrhea?\"       watery  4. VOMITING: \"Are you also vomiting?\" If Yes, ask: \"How many times in the past 24 hours?\"       Nausea, no vomiting.   5. ABDOMEN PAIN: \"Are you having any abdomen pain?\" If Yes, ask: \"What does it feel like?\" (e.g., crampy, dull, intermittent, constant)       Having abdominal pain. Below belly button, sometimes higher after eating. Dull pain. \"Fairly constant, ebbs and flows\", maybe a little bit better after having BM.   6. ABDOMEN PAIN SEVERITY: If present, ask: \"How bad is the pain?\"  (e.g., Scale 1-10; mild, moderate, or severe)    - MILD (1-3): doesn't interfere with normal activities, abdomen soft and not tender to touch     - MODERATE (4-7): interferes with normal activities or awakens from sleep, abdomen tender to touch     - SEVERE (8-10): excruciating pain, doubled over, unable to do any normal activities        Currently at 3 or 4/10. " "  7. ORAL INTAKE: If vomiting, \"Have you been able to drink liquids?\" \"How much liquids have you had in the past 24 hours?\"      Trying to drink, feels hydrated  8. HYDRATION: \"Any signs of dehydration?\" (e.g., dry mouth [not just dry lips], too weak to stand, dizziness, new weight loss) \"When did you last urinate?\"      Feels hydrated.  9. EXPOSURE: \"Have you traveled to a foreign country recently?\" \"Have you been exposed to anyone with diarrhea?\" \"Could you have eaten any food that was spoiled?\"      Was recently on vacation to [place], road trip (first week of August).   10. ANTIBIOTIC USE: \"Are you taking antibiotics now or have you taken antibiotics in the past 2 months?\"        Had course of ABX at end of July for sty in eye.   11. OTHER SYMPTOMS: \"Do you have any other symptoms?\" (e.g., fever, blood in stool)        Abdominal pain. No fever. No known blood in stool. Denies dizziness. Feels tired.   12. PREGNANCY: \"Is there any chance you are pregnant?\" \"When was your last menstrual period?\"        no    Protocols used: Diarrhea-A-LEATHA MATHEW RN 8/21/2024 at 10:54 AM    "

## 2024-08-22 ENCOUNTER — TELEPHONE (OUTPATIENT)
Dept: URGENT CARE | Facility: URGENT CARE | Age: 59
End: 2024-08-22
Payer: COMMERCIAL

## 2024-08-22 DIAGNOSIS — A04.72 C. DIFFICILE COLITIS: Primary | ICD-10-CM

## 2024-08-22 LAB
ADV 40+41 DNA STL QL NAA+NON-PROBE: NEGATIVE
ASTRO TYP 1-8 RNA STL QL NAA+NON-PROBE: NEGATIVE
C CAYETANENSIS DNA STL QL NAA+NON-PROBE: NEGATIVE
C DIFF GDH STL QL IA: POSITIVE
C DIFF TOX A+B STL QL IA: NEGATIVE
C DIFF TOX B STL QL: POSITIVE
CAMPYLOBACTER DNA SPEC NAA+PROBE: NEGATIVE
CRYPTOSP DNA STL QL NAA+NON-PROBE: NEGATIVE
E COLI O157 DNA STL QL NAA+NON-PROBE: NORMAL
E HISTOLYT DNA STL QL NAA+NON-PROBE: NEGATIVE
EAEC ASTA GENE ISLT QL NAA+PROBE: NEGATIVE
EC STX1+STX2 GENES STL QL NAA+NON-PROBE: NEGATIVE
EPEC EAE GENE STL QL NAA+NON-PROBE: NEGATIVE
ETEC LTA+ST1A+ST1B TOX ST NAA+NON-PROBE: NEGATIVE
G LAMBLIA DNA STL QL NAA+NON-PROBE: NEGATIVE
NOROVIRUS GI+II RNA STL QL NAA+NON-PROBE: NEGATIVE
P SHIGELLOIDES DNA STL QL NAA+NON-PROBE: NEGATIVE
RVA RNA STL QL NAA+NON-PROBE: NEGATIVE
SALMONELLA SP RPOD STL QL NAA+PROBE: NEGATIVE
SAPO I+II+IV+V RNA STL QL NAA+NON-PROBE: NEGATIVE
SHIGELLA SP+EIEC IPAH ST NAA+NON-PROBE: NEGATIVE
V CHOLERAE DNA SPEC QL NAA+PROBE: NEGATIVE
VIBRIO DNA SPEC NAA+PROBE: NEGATIVE
Y ENTEROCOL DNA STL QL NAA+PROBE: NEGATIVE

## 2024-08-22 RX ORDER — VANCOMYCIN HYDROCHLORIDE 125 MG/1
125 CAPSULE ORAL 4 TIMES DAILY
Qty: 40 CAPSULE | Refills: 0 | Status: SHIPPED | OUTPATIENT
Start: 2024-08-22 | End: 2024-09-01

## 2024-08-22 NOTE — TELEPHONE ENCOUNTER
Please contact patient    C.diff result positive  RX vancomycin 125 mg four times daily for 10 days efaxed to pharmacy

## 2024-08-22 NOTE — TELEPHONE ENCOUNTER
I call and spoke with the patient and that plus send her a [employer] message, she knows to  her prescription and start taking it, I did remind her that she needs to finish the whole course of the therapy for it to help clear her infection. The patient understand with the plan.  Megan Dalal CMA

## 2024-08-24 ENCOUNTER — TELEPHONE (OUTPATIENT)
Dept: FAMILY MEDICINE | Facility: CLINIC | Age: 59
End: 2024-08-24
Payer: COMMERCIAL

## 2024-08-24 NOTE — TELEPHONE ENCOUNTER
Reason for Call:  Appointment Request    Patient requesting this type of appt:  Hospital/ED Follow-Up     Requested provider:  Jose M Colmenares    Reason patient unable to be scheduled: Not within requested timeframe    When does patient want to be seen/preferred time: 1-2 weeks    Comments: Pt would like to get fit in the week of 08/26/2024, prefers first thing in the morning. They would like to est care and be seen for UC follow-up for C. Diff. Please call pt back to schedule    Since pt called on weekend 08/24/2024, followed after hour work flow and sent a TE to see if pt could get fit in sooner     Could we send this information to you in [facility] or would you prefer to receive a phone call?:   Patient would prefer a phone call   Okay to leave a detailed message?: Yes at Cell number on file:    Telephone Information:   Mobile [phone]       Call taken on 8/24/2024 at 9:33 AM by Nataliya Luis

## 2024-08-26 NOTE — TELEPHONE ENCOUNTER
Routing to provider to advise if patient can be seen sooner.     Alejandra Sullivan  Lead   [facility]

## 2024-09-12 ENCOUNTER — OFFICE VISIT (OUTPATIENT)
Dept: PEDIATRICS | Facility: CLINIC | Age: 59
End: 2024-09-12
Payer: COMMERCIAL

## 2024-09-12 VITALS
SYSTOLIC BLOOD PRESSURE: 146 MMHG | BODY MASS INDEX: 41.95 KG/M2 | HEIGHT: 70 IN | DIASTOLIC BLOOD PRESSURE: 80 MMHG | RESPIRATION RATE: 18 BRPM | TEMPERATURE: 98.1 F | OXYGEN SATURATION: 96 % | WEIGHT: 293 LBS | HEART RATE: 68 BPM

## 2024-09-12 DIAGNOSIS — Z12.11 SCREEN FOR COLON CANCER: Primary | ICD-10-CM

## 2024-09-12 DIAGNOSIS — I10 ESSENTIAL HYPERTENSION: ICD-10-CM

## 2024-09-12 DIAGNOSIS — A04.72 C. DIFFICILE COLITIS: ICD-10-CM

## 2024-09-12 PROCEDURE — 99213 OFFICE O/P EST LOW 20 MIN: CPT | Performed by: STUDENT IN AN ORGANIZED HEALTH CARE EDUCATION/TRAINING PROGRAM

## 2024-09-12 ASSESSMENT — PAIN SCALES - GENERAL: PAINLEVEL: MILD PAIN (2)

## 2024-09-12 ASSESSMENT — ASTHMA QUESTIONNAIRES
QUESTION_3 LAST FOUR WEEKS HOW OFTEN DID YOUR ASTHMA SYMPTOMS (WHEEZING, COUGHING, SHORTNESS OF BREATH, CHEST TIGHTNESS OR PAIN) WAKE YOU UP AT NIGHT OR EARLIER THAN USUAL IN THE MORNING: NOT AT ALL
ACT_TOTALSCORE: 25
QUESTION_5 LAST FOUR WEEKS HOW WOULD YOU RATE YOUR ASTHMA CONTROL: COMPLETELY CONTROLLED
QUESTION_1 LAST FOUR WEEKS HOW MUCH OF THE TIME DID YOUR ASTHMA KEEP YOU FROM GETTING AS MUCH DONE AT WORK, SCHOOL OR AT HOME: NONE OF THE TIME
QUESTION_2 LAST FOUR WEEKS HOW OFTEN HAVE YOU HAD SHORTNESS OF BREATH: NOT AT ALL
QUESTION_4 LAST FOUR WEEKS HOW OFTEN HAVE YOU USED YOUR RESCUE INHALER OR NEBULIZER MEDICATION (SUCH AS ALBUTEROL): NOT AT ALL
ACT_TOTALSCORE: 25

## 2024-09-12 ASSESSMENT — PATIENT HEALTH QUESTIONNAIRE - PHQ9
10. IF YOU CHECKED OFF ANY PROBLEMS, HOW DIFFICULT HAVE THESE PROBLEMS MADE IT FOR YOU TO DO YOUR WORK, TAKE CARE OF THINGS AT HOME, OR GET ALONG WITH OTHER PEOPLE: SOMEWHAT DIFFICULT
SUM OF ALL RESPONSES TO PHQ QUESTIONS 1-9: 7
SUM OF ALL RESPONSES TO PHQ QUESTIONS 1-9: 7

## 2024-09-12 NOTE — PROGRESS NOTES
Assessment & Plan     C. difficile colitis  Screen for colon cancer  Based on her well appearance today and improvement in GI symptoms, I believe her c diff antibiotic treatment with PO vancomycin was successful. We discussed that her loose stools and abdominal pain will continue to improve.    Due to the unclear etiology of her community acquired c diff and questionable history of ulcerative colitis, we will obtain a colonoscopy. Some of her symptoms may be due to an underlying UC flare. She is also due for routine colon cancer screening.    She will continue fluids, avoid dairy, except yogurt, and avoid alcohol. Can try a probiotic. May continue tums and pepto bismal, but avoid other acid suppressing medications. If she does not continue to improve or develops systemic symptoms such as fever, we will consider repeat blood work, stool testing, and abdomen/pelvis ct scan to evaluate for additional causes such as GI abscess.  - Colonoscopy Screening  Referral    Essential hypertension  BP today was elevated at 146/80. 153/87 in August. She will keep a home BP log over the next couple of months. She has a BP cuff at home. Follow up in 2 months and discuss possibly starting BP medication therapy at that time.      Follow up in 2 months for c diff follow-up and discuss healthcare maintenance and chronic health conditions including hypertension at that time.  -mammogram  -pap  -colonoscopy results  -blood pressure  -labs: TSH, lipids, A1c, etc    Future Appointments 9/12/2024 - 3/11/2025        Date Visit Type Length Department Provider     11/15/2024  9:30 AM OFFICE VISIT 30 min [facility]ANNA[facility] COSBY/PEDS Milligan, Deirdre E, MD     Jayy Jennings is a 59 year old, presenting for the following health issues:  ER F/U        9/12/2024     9:06 AM   Additional Questions   Roomed by Anahi   Accompanied by none         9/12/2024     9:06 AM   Patient Reported Additional Medications   Patient reports taking the following new  medications none     HPI   Michaela is a 59 year old woman with a past medical history of EVA, hypothyroidism, hypertension and asthma. She was seen in the UC on 8/21/2024 with severe, watery diarrhea. Diarrhea symptoms began around 8/7. Stool tests were positive for c Diff and she was prescribed Vancomycin 125mg for 10 days. She states she completed the full course.    Today she reports she is still having some diarrhea but is overall improved. She has loose stools 2 - 3 times per day. No bloody stools. She has generalized abdominal pain that is worse at night and before a bowel movement.  Has some nausea and heartburn. No vomiting. Has been able to eat and drink fluids. No fevers or other systemic symptoms. Taking pepto-bismal and tums which have been helpful. Has been avoiding dairy. Was taking zantac for treatment prior to c diff.    She has a history of colonoscopy and ulcerative colitis diagnosis many years ago.However she had a normal colonoscopy 1 year later. Prior to c diff infection she does report intermittent diarrhea and constipation.     Has a history of hypertension and was previously on lisinopril for treatment. Lost weight and had lowered BP and discontinued BP medication. No current BP treatment. Not takign Bps at home.    ED/UC Followup:    Facility:  [facility]  Date of visit: 8/21/24  Reason for visit: C diff  Current Status: still having soft stools, still having indigestion, able to eat only small amounts of food at a time      Review of Systems  CONSTITUTIONAL: NEGATIVE for fever, chills, change in weight  ENT/MOUTH: NEGATIVE for ear, mouth and throat problems  RESP: NEGATIVE for significant cough or SOB  CV: NEGATIVE for chest pain, palpitations or peripheral edema  GI: POSITIVE for abdominal pain generalized, diarrhea, heartburn or reflux, and nausea and NEGATIVE for melena, poor appetite, and weight loss      Objective    BP (!) 146/80 (BP Location: Right arm, Patient Position: Sitting, Cuff Size:  "Adult Large)   Pulse 68   Temp 98.1  F (36.7  C) (Tympanic)   Resp 18   Ht 1.778 m (5' 10\")   Wt 146.1 kg (322 lb)   LMP  (LMP Unknown)   SpO2 96%   BMI 46.20 kg/m    Body mass index is 46.2 kg/m .  Physical Exam  Vitals reviewed.   Constitutional:       Appearance: Normal appearance. She is obese.   HENT:      Head: Normocephalic.   Cardiovascular:      Rate and Rhythm: Normal rate.   Pulmonary:      Effort: Pulmonary effort is normal.   Skin:     General: Skin is warm and dry.   Neurological:      General: No focal deficit present.      Mental Status: She is alert and oriented to person, place, and time.   Psychiatric:         Mood and Affect: Mood normal.         Behavior: Behavior normal.         Thought Content: Thought content normal.         Judgment: Judgment normal.      Results  Office Visit on 08/21/2024   Component Date Value Ref Range Status    Color Urine 08/21/2024 Yellow  Colorless, Straw, Light Yellow, Yellow Final    Appearance Urine 08/21/2024 Clear  Clear Final    Glucose Urine 08/21/2024 Negative  Negative mg/dL Final    Bilirubin Urine 08/21/2024 Negative  Negative Final    Ketones Urine 08/21/2024 Negative  Negative mg/dL Final    Specific Gravity Urine 08/21/2024 1.010  1.003 - 1.035 Final    Blood Urine 08/21/2024 Negative  Negative Final    pH Urine 08/21/2024 6.0  5.0 - 7.0 Final    Protein Albumin Urine 08/21/2024 Negative  Negative mg/dL Final    Urobilinogen Urine 08/21/2024 0.2  0.2, 1.0 E.U./dL Final    Nitrite Urine 08/21/2024 Negative  Negative Final    Leukocyte Esterase Urine 08/21/2024 Negative  Negative Final    WBC Count 08/21/2024 9.3  4.0 - 11.0 10e3/uL Final    RBC Count 08/21/2024 4.98  3.80 - 5.20 10e6/uL Final    Hemoglobin 08/21/2024 13.9  11.7 - 15.7 g/dL Final    Hematocrit 08/21/2024 44.0  35.0 - 47.0 % Final    MCV 08/21/2024 88  78 - 100 fL Final    MCH 08/21/2024 27.9  26.5 - 33.0 pg Final    MCHC 08/21/2024 31.6  31.5 - 36.5 g/dL Final    RDW 08/21/2024 " 14.1  10.0 - 15.0 % Final    Platelet Count 08/21/2024 277  150 - 450 10e3/uL Final    Sodium 08/21/2024 145  135 - 145 mmol/L Final    Potassium 08/21/2024 4.3  3.4 - 5.3 mmol/L Final    Chloride 08/21/2024 106  94 - 109 mmol/L Final    Carbon Dioxide (CO2) 08/21/2024 31  20 - 32 mmol/L Final    Anion Gap 08/21/2024 8  3 - 14 mmol/L Final    Urea Nitrogen 08/21/2024 9  7 - 30 mg/dL Final    Creatinine 08/21/2024 0.90  0.52 - 1.04 mg/dL Final    GFR Estimate 08/21/2024 73  >60 mL/min/1.73m2 Final    Calcium 08/21/2024 9.9  8.5 - 10.1 mg/dL Final    Glucose 08/21/2024 108 (H)  70 - 99 mg/dL Final    Alkaline Phosphatase 08/21/2024 89  40 - 150 U/L Final    AST 08/21/2024 28  0 - 45 U/L Final    ALT 08/21/2024 20  0 - 50 U/L Final    Protein Total 08/21/2024 7.8  6.8 - 8.8 g/dL Final    Albumin 08/21/2024 3.6  3.4 - 5.0 g/dL Final    Bilirubin Total 08/21/2024 0.5  0.2 - 1.3 mg/dL Final    Lipase 08/21/2024 25  13 - 60 U/L Final    Amylase 08/21/2024 44  28 - 100 U/L Final    Campylobacter species 08/21/2024 Negative  Negative Final    Salmonella species 08/21/2024 Negative  Negative Final    Vibrio species 08/21/2024 Negative  Negative Final    Vibrio cholerae 08/21/2024 Negative  Negative Final    Yersinia enterocolitica 08/21/2024 Negative  Negative Final    Enteropathogenic E. coli (EPEC) 08/21/2024 Negative  Negative, NA Final    Shiga-like toxin-producing E. coli* 08/21/2024 Negative  Negative Final    Shigella/Enteroinvasive E. coli (E* 08/21/2024 Negative  Negative Final    Cryptosporidium species 08/21/2024 Negative  Negative Final    Giardia lamblia 08/21/2024 Negative  Negative Final    Norovirus Gl/Gll 08/21/2024 Negative  Negative Final    Rotavirus A 08/21/2024 Negative  Negative Final    Plesiomonas shigelloides 08/21/2024 Negative  Negative Final    Enteroaggregative E. coli (EAEC) 08/21/2024 Negative  Negative Final    Enterotoxigenic E. coli (ETEC) 08/21/2024 Negative  Negative Final    E. coli O157  08/21/2024 NA  Negative, NA Final    Cyclospora cayetanensis 08/21/2024 Negative  Negative Final    Entamoeba histolytica 08/21/2024 Negative  Negative Final    Adenovirus F40/41 08/21/2024 Negative  Negative Final    Astrovirus 08/21/2024 Negative  Negative Final    Sapovirus 08/21/2024 Negative  Negative Final    C Difficile Toxin B by PCR 08/21/2024 Positive (A)  Negative Final    Detection of C. difficile nucleic acid in stools confirms the presence of these organisms in diarrheal patients but may not indicate that C. difficile is the etiologic agent of the diarrhea. Results from the Xpert C. difficile assay should be interpreted in conjunction with other laboratory and clinical data available to the clinician.    Patients with a positive C. difficile PCR result will receive reflex GDH/Toxin Immunoassay testing. Please interpret the PCR test result in conjunction with GDH/Toxin Immunoassay results and the clinical status of patient.    C. difficile GDH Antigen 08/21/2024 Positive (A)  Negative Final    C. difficile Toxin 08/21/2024 Negative  Negative Final         April Merlene, MS3  [employer]    I was present with the medical student who participated in the service and in the documentation of this note. I have verified the history and personally performed the physical exam and medical decision making. I have verified and edited the note, which accurately reflects my assessment of the patient and the plan of care.     Signed Electronically by: Deirdre E. Milligan, MD

## 2024-09-12 NOTE — PATIENT INSTRUCTIONS
[employer]        NAME: Ema Izquierdo is a 27 y o  male  : 1992    MRN: [id number]  DATE: 2022  TIME: 2:07 PM    Assessment and Plan   Pain, dental [K08 89]  1  Pain, dental  amoxicillin-clavulanate (AUGMENTIN) 875-125 mg per tablet    al mag oxide-diphenhydramine-lidocaine viscous (MAGIC MOUTHWASH) 1:1:1 suspension            Patient Instructions   Patient Instructions   Follow up with your dentist         Follow up with PCP in 3-5 days  Proceed to  ER if symptoms worsen  Chief Complaint     Chief Complaint   Patient presents with   • Dental Pain     Lower rt jaw has broken tooth causing pain          History of Present Illness       The pt is a 28 yo male presenting today for dental pain  He woke up from his sleep last night in severe pain to his right jaw and back molar tooth  He has a broken tooth in his lower right jaw that he is supposed to be removed in the coming months  He denies fever, chills, ear pain, or dizziness  His dentist is going to try to get him in earlier  Review of Systems   Review of Systems   Constitutional: Negative for activity change, appetite change, chills, diaphoresis and fever  HENT: Positive for dental problem  Negative for congestion, ear pain, rhinorrhea and sore throat  Eyes: Negative for pain and visual disturbance  Respiratory: Negative for cough, chest tightness and shortness of breath  Cardiovascular: Negative for chest pain and palpitations  Gastrointestinal: Negative for abdominal pain, diarrhea, nausea and vomiting  Genitourinary: Negative for dysuria and hematuria  Musculoskeletal: Negative for arthralgias, back pain and myalgias  Skin: Negative for color change, pallor and rash  Neurological: Negative for seizures, syncope and headaches  All other systems reviewed and are negative          Current Medications       Current Outpatient Medications:   •  al mag oxide-diphenhydramine-lidocaine viscous (MAGIC Consider buying the [employer] home automatic blood pressure cuff - series 3. Available on [employer], at pharmacies.  I recommend checking your blood pressure 2-3 times per week at around the same time every day.  Keep a log in a notebook or in a note in your cell phone.     MOUTHWASH) 1:1:1 suspension, Swish and spit 10 mL every 4 (four) hours as needed for mouth pain or discomfort, Disp: 120 mL, Rfl: 0  •  amoxicillin-clavulanate (AUGMENTIN) 875-125 mg per tablet, Take 1 tablet by mouth every 12 (twelve) hours for 7 days, Disp: 14 tablet, Rfl: 0  •  omeprazole (PriLOSEC) 40 MG capsule, Take 1 capsule (40 mg total) by mouth daily before breakfast, Disp: 30 capsule, Rfl: 0  •  Loratadine 10 MG CAPS, Take by mouth as needed (Patient not taking: Reported on 11/22/2022), Disp: , Rfl:     Current Allergies     Allergies as of 11/22/2022   • (No Known Allergies)            The following portions of the patient's history were reviewed and updated as appropriate: allergies, current medications, past family history, past medical history, past social history, past surgical history and problem list      Past Medical History:   Diagnosis Date   • Seasonal allergies        Past Surgical History:   Procedure Laterality Date   • TONSILLECTOMY         Family History   Problem Relation Age of Onset   • No Known Problems Mother          Medications have been verified  Objective   Pulse 70   Temp 98 1 °F (36 7 °C)   Resp 20   Ht 5' 8" (1 727 m)   Wt 119 kg (262 lb)   SpO2 98%   BMI 39 84 kg/m²        Physical Exam     Physical Exam  Vitals and nursing note reviewed  Constitutional:       General: He is not in acute distress  Appearance: Normal appearance  He is normal weight  He is not ill-appearing, toxic-appearing or diaphoretic  HENT:      Head: Normocephalic and atraumatic  Nose: Nose normal  No congestion or rhinorrhea  Mouth/Throat:      Mouth: Mucous membranes are moist       Pharynx: Oropharynx is clear  No oropharyngeal exudate or posterior oropharyngeal erythema  Comments: Poor dentition - 3 broken teeth     Cardiovascular:      Rate and Rhythm: Normal rate and regular rhythm  Heart sounds: Normal heart sounds  No murmur heard  No friction rub   No gallop  Pulmonary:      Effort: Pulmonary effort is normal  No respiratory distress  Breath sounds: Normal breath sounds  No stridor  No wheezing, rhonchi or rales  Chest:      Chest wall: No tenderness  Musculoskeletal:      Cervical back: Normal range of motion  Lymphadenopathy:      Cervical: No cervical adenopathy  Skin:     General: Skin is warm and dry  Capillary Refill: Capillary refill takes less than 2 seconds  Neurological:      Mental Status: He is alert

## 2024-09-30 ENCOUNTER — TELEPHONE (OUTPATIENT)
Dept: GASTROENTEROLOGY | Facility: CLINIC | Age: 59
End: 2024-09-30
Payer: COMMERCIAL

## 2024-09-30 NOTE — TELEPHONE ENCOUNTER
"Endoscopy Scheduling Screen    Have you had any respiratory illness or flu-like symptoms in the last 10 days?  No    What is your communication preference for Instructions and/or Bowel Prep?   MyChart    What insurance is in the chart?  Other:  [facility]    Ordering/Referring Provider: Milligan, Deirdre E, MD   (If ordering provider performs procedure, schedule with ordering provider unless otherwise instructed. )    BMI: Estimated body mass index is 46.2 kg/m  as calculated from the following:    Height as of 9/12/24: 1.778 m (5' 10\").    Weight as of 9/12/24: 146.1 kg (322 lb).     Sedation Ordered  moderate sedation.   If patient BMI > 50 do not schedule in [facility].    If patient BMI > 45 do not schedule at [facility].    Are you taking methadone or Suboxone?  NO, No RN review required.    Have you been diagnosed and are being treated for severe PTSD or severe anxiety?  NO, No RN review required.    Are you taking any prescription medications for pain 3 or more times per week?   NO, No RN review required.    Do you have a history of malignant hyperthermia?  No    (Females) Are you currently pregnant?   No     Have you been diagnosed or told you have pulmonary hypertension?   No    Do you have an LVAD?  No    Have you been told you have moderate to severe sleep apnea?  Yes. Do you use a CPAP? No. (RN Review required for scheduling unless scheduling in Hospital.)     Have you been told you have COPD, asthma, or any other lung disease?  Yes     What breathing problems do you have?  Asthma     Do you use home oxygen?  No    Have your breathing problems required an ED visit or hospitalization in the last year?  No.    Do you have any heart conditions?  No     Have you ever had or are you waiting for an organ transplant?  No. Continue scheduling, no site restrictions.    Have you had a stroke or transient ischemic attack (TIA aka \"mini stroke\" in the last 6 months?   No    Have you been diagnosed with or been told you have cirrhosis " "of the liver?   No.    Are you currently on dialysis?   No    Do you need assistance transferring?   No    BMI: Estimated body mass index is 46.2 kg/m  as calculated from the following:    Height as of 9/12/24: 1.778 m (5' 10\").    Weight as of 9/12/24: 146.1 kg (322 lb).     Is patients BMI > 40 and scheduling location UP?  No    Do you take an injectable or oral medication for weight loss or diabetes (excluding insulin)?  No    Do you take the medication Naltrexone?  No    Do you take blood thinners?  No       Prep   Are you currently on dialysis or do you have chronic kidney disease?  No    Do you have a diagnosis of diabetes?  No    Do you have a diagnosis of cystic fibrosis (CF)?  No    On a regular basis do you go 3 -5 days between bowel movements?  No    BMI > 40?  Yes (Extended Prep)    Preferred Pharmacy:    [id number] - [address] [address]  Phone: [phone] Fax: [phone]      Final Scheduling Details     Procedure scheduled  Colonoscopy    Surgeon:  Mello     Date of procedure:  10/15/2024     Pre-OP / PAC:   No - Not required for this site.    Location  [facility] - Patient preference.    Sedation   Moderate Sedation - Per order.      Patient Reminders:   You will receive a call from a Nurse to review instructions and health history.  This assessment must be completed prior to your procedure.  Failure to complete the Nurse assessment may result in the procedure being cancelled.      On the day of your procedure, please designate an adult(s) who can drive you home stay with you for the next 24 hours. The medicines used in the exam will make you sleepy. You will not be able to drive.      You cannot take public transportation, ride share services, or non-medical taxi service without a responsible caregiver.  Medical transport services are allowed with the requirement that a responsible caregiver will receive you at your destination.  We " require that drivers and caregivers are confirmed prior to your procedure.

## 2024-10-03 ENCOUNTER — TELEPHONE (OUTPATIENT)
Dept: GASTROENTEROLOGY | Facility: CLINIC | Age: 59
End: 2024-10-03
Payer: COMMERCIAL

## 2024-10-03 DIAGNOSIS — Z12.11 SCREEN FOR COLON CANCER: Primary | ICD-10-CM

## 2024-10-03 RX ORDER — BISACODYL 5 MG/1
TABLET, DELAYED RELEASE ORAL
Qty: 4 TABLET | Refills: 0 | Status: ON HOLD | OUTPATIENT
Start: 2024-10-03 | End: 2024-10-15

## 2024-10-03 NOTE — TELEPHONE ENCOUNTER
Attempted to contact patient in order to complete pre assessment questions.     No answer. Left message to return call to [phone] option 2    Callback required communication sent via [employer].      Marysol Manning RN  Endoscopy Procedure Pre Assessment

## 2024-10-03 NOTE — TELEPHONE ENCOUNTER
Pre visit planning completed.      Procedure details:    Patient scheduled for Colonoscopy on 10.15.2024.     Arrival time: 0645. Procedure time 0715    Facility location: [facility]; [address] [place] [address]. Check in location: Main entrance, door #1 on the North side of the building under roundabout awning. DO NOT GO TO SURGERY/ED ENTRANCE.     Sedation type: Conscious sedation     Pre op exam needed? No.    Indication for procedure:    Screen for colon cancer   C. difficile colitis         Chart review:     Electronic implanted devices? No    Recent diagnosis of diverticulitis within the last 6 weeks? No      Medication review:    Diabetic? Prediabetic    Anticoagulants? No    Weight loss medication/injectable? No GLP-1 medication per patient's medication list.  RN will verify with pre-assessment call.    Other medication HOLDING recommendations:  N/A      Prep for procedure:     Bowel prep recommendation: Extended Golytely. Bowel prep prescription sent to [id number] [place] - [address] [place] [address]   Due to: BMI > 40.     Prep instructions sent via [employer]         Marysol Manning RN  Endoscopy Procedure Pre Assessment RN  [phone] option 2

## 2024-10-04 NOTE — TELEPHONE ENCOUNTER
Pre assessment completed for upcoming procedure.   (Please see previous telephone encounter notes for complete details)    Patient  returned call.       Procedure details:    Arrival time and facility location reviewed.    Pre op exam needed? No.    Designated  policy reviewed. Instructed to have someone stay 6  hours post procedure.       Medication review:    Medications reviewed. Please see supporting documentation below. Holding recommendations discussed (if applicable).   N/A      Prep for procedure:     Procedure prep instructions reviewed.        Any additional information needed:  N/A      Patient  verbalized understanding and had no questions or concerns at this time.      Katia Brooks RN  Endoscopy Procedure Pre Assessment   [phone] option 2

## 2024-10-15 ENCOUNTER — HOSPITAL ENCOUNTER (OUTPATIENT)
Facility: CLINIC | Age: 59
Discharge: HOME | End: 2024-10-15
Attending: INTERNAL MEDICINE | Admitting: INTERNAL MEDICINE
Payer: COMMERCIAL

## 2024-10-15 VITALS
HEART RATE: 60 BPM | WEIGHT: 293 LBS | RESPIRATION RATE: 16 BRPM | SYSTOLIC BLOOD PRESSURE: 133 MMHG | DIASTOLIC BLOOD PRESSURE: 67 MMHG | BODY MASS INDEX: 41.95 KG/M2 | OXYGEN SATURATION: 95 % | HEIGHT: 70 IN

## 2024-10-15 LAB — COLONOSCOPY: NORMAL

## 2024-10-15 PROCEDURE — 45378 DIAGNOSTIC COLONOSCOPY: CPT | Performed by: INTERNAL MEDICINE

## 2024-10-15 PROCEDURE — 250N000011 HC RX IP 250 OP 636: Performed by: INTERNAL MEDICINE

## 2024-10-15 PROCEDURE — G0500 MOD SEDAT ENDO SERVICE >5YRS: HCPCS | Performed by: INTERNAL MEDICINE

## 2024-10-15 RX ORDER — PROCHLORPERAZINE MALEATE 10 MG
10 TABLET ORAL EVERY 6 HOURS PRN
Status: DISCONTINUED | OUTPATIENT
Start: 2024-10-15 | End: 2024-10-15 | Stop reason: HOSPADM

## 2024-10-15 RX ORDER — NALOXONE HYDROCHLORIDE 0.4 MG/ML
0.2 INJECTION, SOLUTION INTRAMUSCULAR; INTRAVENOUS; SUBCUTANEOUS
Status: DISCONTINUED | OUTPATIENT
Start: 2024-10-15 | End: 2024-10-15 | Stop reason: HOSPADM

## 2024-10-15 RX ORDER — ONDANSETRON 2 MG/ML
4 INJECTION INTRAMUSCULAR; INTRAVENOUS EVERY 6 HOURS PRN
Status: DISCONTINUED | OUTPATIENT
Start: 2024-10-15 | End: 2024-10-15 | Stop reason: HOSPADM

## 2024-10-15 RX ORDER — FLUMAZENIL 0.1 MG/ML
0.2 INJECTION, SOLUTION INTRAVENOUS
Status: DISCONTINUED | OUTPATIENT
Start: 2024-10-15 | End: 2024-10-15 | Stop reason: HOSPADM

## 2024-10-15 RX ORDER — FENTANYL CITRATE 50 UG/ML
50-100 INJECTION, SOLUTION INTRAMUSCULAR; INTRAVENOUS EVERY 5 MIN PRN
Status: DISCONTINUED | OUTPATIENT
Start: 2024-10-15 | End: 2024-10-15 | Stop reason: HOSPADM

## 2024-10-15 RX ORDER — NALOXONE HYDROCHLORIDE 0.4 MG/ML
0.4 INJECTION, SOLUTION INTRAMUSCULAR; INTRAVENOUS; SUBCUTANEOUS
Status: DISCONTINUED | OUTPATIENT
Start: 2024-10-15 | End: 2024-10-15 | Stop reason: HOSPADM

## 2024-10-15 RX ORDER — EPINEPHRINE 1 MG/ML
0.1 INJECTION, SOLUTION, CONCENTRATE INTRAVENOUS
Status: DISCONTINUED | OUTPATIENT
Start: 2024-10-15 | End: 2024-10-15 | Stop reason: HOSPADM

## 2024-10-15 RX ORDER — SIMETHICONE 40MG/0.6ML
133 SUSPENSION, DROPS(FINAL DOSAGE FORM)(ML) ORAL
Status: DISCONTINUED | OUTPATIENT
Start: 2024-10-15 | End: 2024-10-15 | Stop reason: HOSPADM

## 2024-10-15 RX ORDER — ATROPINE SULFATE 0.1 MG/ML
1 INJECTION INTRAVENOUS
Status: DISCONTINUED | OUTPATIENT
Start: 2024-10-15 | End: 2024-10-15 | Stop reason: HOSPADM

## 2024-10-15 RX ORDER — LIDOCAINE 40 MG/G
CREAM TOPICAL
Status: DISCONTINUED | OUTPATIENT
Start: 2024-10-15 | End: 2024-10-15 | Stop reason: HOSPADM

## 2024-10-15 RX ORDER — DIPHENHYDRAMINE HYDROCHLORIDE 50 MG/ML
25-50 INJECTION INTRAMUSCULAR; INTRAVENOUS
Status: DISCONTINUED | OUTPATIENT
Start: 2024-10-15 | End: 2024-10-15 | Stop reason: HOSPADM

## 2024-10-15 RX ORDER — ONDANSETRON 2 MG/ML
4 INJECTION INTRAMUSCULAR; INTRAVENOUS
Status: DISCONTINUED | OUTPATIENT
Start: 2024-10-15 | End: 2024-10-15 | Stop reason: HOSPADM

## 2024-10-15 RX ORDER — ONDANSETRON 4 MG/1
4 TABLET, ORALLY DISINTEGRATING ORAL EVERY 6 HOURS PRN
Status: DISCONTINUED | OUTPATIENT
Start: 2024-10-15 | End: 2024-10-15 | Stop reason: HOSPADM

## 2024-10-15 RX ADMIN — FENTANYL CITRATE 100 MCG: 0.05 INJECTION, SOLUTION INTRAMUSCULAR; INTRAVENOUS at 07:27

## 2024-10-15 RX ADMIN — MIDAZOLAM 2 MG: 1 INJECTION INTRAMUSCULAR; INTRAVENOUS at 07:27

## 2024-10-15 ASSESSMENT — ACTIVITIES OF DAILY LIVING (ADL)
ADLS_ACUITY_SCORE: 37
ADLS_ACUITY_SCORE: 37

## 2024-10-15 NOTE — H&P
Pre-Endoscopy History and Physical     Michaela Johnson MRN# [id number]   YOB: 1965 Age: 59 year old     Date of Procedure: 10/15/2024  Primary care provider: Milligan, Deirdre E  Type of Endoscopy: Colonoscopy with possible biopsy, possible polypectomy  Reason for Procedure: screen  Type of Anesthesia Anticipated: Conscious Sedation    HPI:    Michaela is a 59 year old female who will be undergoing the above procedure.      A history and physical has been performed. The patient's medications and allergies have been reviewed. The risks and benefits of the procedure and the sedation options and risks were discussed with the patient.  All questions were answered and informed consent was obtained.      She denies a personal or family history of anesthesia complications or bleeding disorders.     Patient Active Problem List   Diagnosis    Morbid obesity (H)    EVA (obstructive sleep apnea)    S/P total knee arthroplasty, right    Incarcerated umbilical hernia    Incisional hernia with obstruction but no gangrene    Small bowel obstruction (H)    Ulcerative colitis (H)    Prediabetes    Major depression, single episode    Hypothyroidism    Vitamin D deficiency    Essential hypertension    Asthma, mild intermittent        Past Medical History:   Diagnosis Date    Arthritis     both knees    Essential hypertension 12/8/2006    Hashimoto's disease     History of blood transfusion 1999    Hyperglycemia     Obesity     EVA (obstructive sleep apnea)     Palpitations     Sleep apnea     CPAP    Ulcerative colitis (H)     Uncomplicated asthma     assoc with cats, dust and really cold weather        Past Surgical History:   Procedure Laterality Date    ARTHROPLASTY KNEE Right 10/18/2021    Procedure: Right total knee arthroplasty using an Arthrex iBalance knee system;  Surgeon: Jenaro Jewell MD;  Location: [facility] OR    CHOLECYSTECTOMY  2006    ENT SURGERY  1994    sinus with T&A    GYN SURGERY  1999    D&C     HERNIORRHAPHY UMBILICAL N/A 3/23/2022    Procedure: INCISIONAL HERNIA REPAIR;  Surgeon: Vasquez Pope MD;  Location: [facility] OR       Social History     Tobacco Use    Smoking status: Never    Smokeless tobacco: Never   Substance Use Topics    Alcohol use: Yes     Comment: maybe once a month        Family History   Adopted: Yes   Problem Relation Age of Onset    Unknown/Adopted Mother     Unknown/Adopted Father        Prior to Admission medications    Medication Sig Start Date End Date Taking? Authorizing Provider   acetaminophen (TYLENOL) 325 MG tablet Take 2 tablets (650 mg) by mouth every 4 hours as needed for other (mild pain)  Patient not taking: Reported on 7/27/2024 10/18/21   Jenaro Jewell MD   bisacodyl (DULCOLAX) 5 MG EC tablet 2 days prior to procedure, take 2 tablets at 4 pm. 1 day prior to procedure, take 2 tablets at 4 pm. For additional instructions refer to your colonoscopy prep instructions. 10/3/24   Aly Sarkar MD   diclofenac (VOLTAREN) 75 MG EC tablet Take 1 tablet (75 mg) by mouth 2 times daily  Patient not taking: Reported on 7/27/2024 1/12/24   Teri Mehta PA-C   diclofenac (VOLTAREN) 75 MG EC tablet Take 1 tablet (75 mg) by mouth 2 times daily  Patient not taking: Reported on 1/12/2024 12/20/23   Teri Mehta PA-C   hydrOXYzine (ATARAX) 25 MG tablet Take 1 tablet (25 mg) by mouth every 6 hours as needed for itching or anxiety (with pain, moderate pain)  Patient not taking: Reported on 1/12/2024 10/18/21   Jenaro Jewell MD   Melatonin 10 MG TABS tablet Take 10 mg by mouth nightly as needed for sleep  Patient not taking: Reported on 1/12/2024    Reported, Patient   NP THYROID 60 MG tablet Take 1 tablet by mouth daily at 2 pm 1/1/24   Reported, Patient   ondansetron (ZOFRAN ODT) 4 MG ODT tab Take 1 tablet (4 mg) by mouth every 8 hours as needed for nausea. 8/21/24   Teri Mehta PA-C   polyethylene glycol (GOLYTELY) 236 g suspension 2  "days prior at 5pm, mix and drink half of a jug of Golytely. Drink an 8 oz. glass of Golytely every 15 minutes until half of the jug is gone. Place remainder of Golytely in the refrigerator. 1 day prior at 5 pm, drink the 2nd half of a jug of Golytely bowel prep. 6 hours before your check-in time, drink an 8 oz. glass of Golytely every 15 minutes until half of the 2nd jug of Golytely is gone. Discard remainder of second jug. 10/3/24   Aly Sarkar MD   thyroid (ARMOUR) 30 MG tablet Take 60 mg by mouth daily   Patient not taking: Reported on 7/27/2024    Reported, Patient   VITAMIN D, CHOLECALCIFEROL, PO Take 5,000 Units by mouth daily     Reported, Patient       Allergies   Allergen Reactions    Cats     Dust Mites     Erythromycin Nausea        REVIEW OF SYSTEMS:   5 point ROS negative except as noted above in HPI, including Gen., Resp., CV, GI &  system review.    PHYSICAL EXAM:   LMP  (LMP Unknown)  Estimated body mass index is 46.2 kg/m  as calculated from the following:    Height as of 9/12/24: 1.778 m (5' 10\").    Weight as of 9/12/24: 146.1 kg (322 lb).   GENERAL APPEARANCE: alert, and oriented  MENTAL STATUS: alert  AIRWAY EXAM: Mallampatti Class I (visualization of the soft palate, fauces, uvula, anterior and posterior pillars)  RESP: lungs clear to auscultation - no rales, rhonchi or wheezes  CV: regular rates and rhythm  DIAGNOSTICS:    Not indicated    IMPRESSION   ASA Class 2 - Mild systemic disease    PLAN:   Plan for Colonoscopy with possible biopsy, possible polypectomy. We discussed the risks, benefits and alternatives and the patient wished to proceed.    The above has been forwarded to the consulting provider.      Signed Electronically by: Aly Sarkar MD  October 15, 2024          "

## 2024-11-15 ENCOUNTER — OFFICE VISIT (OUTPATIENT)
Dept: PEDIATRICS | Facility: CLINIC | Age: 59
End: 2024-11-15
Payer: COMMERCIAL

## 2024-11-15 VITALS
HEIGHT: 70 IN | SYSTOLIC BLOOD PRESSURE: 156 MMHG | WEIGHT: 293 LBS | BODY MASS INDEX: 41.95 KG/M2 | TEMPERATURE: 97.6 F | RESPIRATION RATE: 13 BRPM | HEART RATE: 61 BPM | DIASTOLIC BLOOD PRESSURE: 84 MMHG | OXYGEN SATURATION: 98 %

## 2024-11-15 DIAGNOSIS — I10 ESSENTIAL HYPERTENSION: Primary | ICD-10-CM

## 2024-11-15 DIAGNOSIS — E03.9 HYPOTHYROIDISM, UNSPECIFIED TYPE: ICD-10-CM

## 2024-11-15 DIAGNOSIS — Z13.220 SCREENING CHOLESTEROL LEVEL: ICD-10-CM

## 2024-11-15 DIAGNOSIS — Z12.31 VISIT FOR SCREENING MAMMOGRAM: ICD-10-CM

## 2024-11-15 DIAGNOSIS — Z13.1 SCREENING FOR DIABETES MELLITUS: ICD-10-CM

## 2024-11-15 PROBLEM — Z96.652 S/P TOTAL KNEE REPLACEMENT, LEFT: Status: ACTIVE | Noted: 2024-11-15

## 2024-11-15 LAB
CHOLEST SERPL-MCNC: 181 MG/DL
EST. AVERAGE GLUCOSE BLD GHB EST-MCNC: 131 MG/DL
FASTING STATUS PATIENT QL REPORTED: NO
HBA1C MFR BLD: 6.2 % (ref 0–5.6)
HDLC SERPL-MCNC: 46 MG/DL
LDLC SERPL CALC-MCNC: 110 MG/DL
NONHDLC SERPL-MCNC: 135 MG/DL
TRIGL SERPL-MCNC: 124 MG/DL
TSH SERPL DL<=0.005 MIU/L-ACNC: 2.24 UIU/ML (ref 0.3–4.2)

## 2024-11-15 PROCEDURE — 90471 IMMUNIZATION ADMIN: CPT | Performed by: STUDENT IN AN ORGANIZED HEALTH CARE EDUCATION/TRAINING PROGRAM

## 2024-11-15 PROCEDURE — 83036 HEMOGLOBIN GLYCOSYLATED A1C: CPT | Performed by: STUDENT IN AN ORGANIZED HEALTH CARE EDUCATION/TRAINING PROGRAM

## 2024-11-15 PROCEDURE — 84443 ASSAY THYROID STIM HORMONE: CPT | Performed by: STUDENT IN AN ORGANIZED HEALTH CARE EDUCATION/TRAINING PROGRAM

## 2024-11-15 PROCEDURE — 90715 TDAP VACCINE 7 YRS/> IM: CPT | Performed by: STUDENT IN AN ORGANIZED HEALTH CARE EDUCATION/TRAINING PROGRAM

## 2024-11-15 PROCEDURE — 99214 OFFICE O/P EST MOD 30 MIN: CPT | Mod: 25 | Performed by: STUDENT IN AN ORGANIZED HEALTH CARE EDUCATION/TRAINING PROGRAM

## 2024-11-15 PROCEDURE — 36415 COLL VENOUS BLD VENIPUNCTURE: CPT | Performed by: STUDENT IN AN ORGANIZED HEALTH CARE EDUCATION/TRAINING PROGRAM

## 2024-11-15 PROCEDURE — 80061 LIPID PANEL: CPT | Performed by: STUDENT IN AN ORGANIZED HEALTH CARE EDUCATION/TRAINING PROGRAM

## 2024-11-15 RX ORDER — THYROID 60 MG/1
60 TABLET ORAL DAILY
Qty: 90 TABLET | Refills: 4 | Status: SHIPPED | OUTPATIENT
Start: 2024-11-15

## 2024-11-15 RX ORDER — AMLODIPINE BESYLATE 5 MG/1
5 TABLET ORAL DAILY
Qty: 90 TABLET | Refills: 1 | Status: SHIPPED | OUTPATIENT
Start: 2024-11-15

## 2024-11-15 ASSESSMENT — PAIN SCALES - GENERAL: PAINLEVEL_OUTOF10: NO PAIN (0)

## 2024-11-15 NOTE — PATIENT INSTRUCTIONS
Consider buying the [employer] home automatic blood pressure cuff. Available on [employer], at pharmacies.  I recommend checking your blood pressure 2-3 times per week at around the same time every day.  Keep a log in a notebook or in a note in your cell phone.        SHINGLES VACCINE:  If you are over 50 I recommend the Shingrix vaccine. Two doses of Shingrix provides more than 90% protection against shingles and postherpetic neuralgia (PHN), a type of chronic pain that is the most common complication of shingles.   - even if you've had the older shingles vaccine (Zostavax) it's okay to get the new vaccine.   - even if you've had shingles before the vaccine can be helpful.    Typically the best (and cheapest!) place to get this vaccine is our pharmacy downstairs. The vaccine is a two shot series - I recommend getting the second shot 2-6 months after the first dose.    You may have a sore arm and feel mild flu-like symptoms for a day or two after the vaccine. Most people do not need to adjust their regular activities. It's okay to take Tylenol or ibuprofen if you have side effects.

## 2024-11-15 NOTE — PROGRESS NOTES
"  Assessment & Plan     Essential hypertension  History hypertension, got off medication lisinopril after weight loss. Persistently elevated blood pressure readings at now 2+ clinic visits. Recommend re-initiate antihypertensive treatment with amlodipine 5mg. Follow up in 1-2 months. Patient does have blood pressure cuff at home and just got batteries last night.  - amLODIPine (NORVASC) 5 MG tablet; Take 1 tablet (5 mg) by mouth daily.    Visit for screening mammogram  - MA Screening Bilateral w/ Philip; Future    Hypothyroidism, unspecified type  On NP thyroid. I can take over prescription. Check TSH.  - TSH WITH FREE T4 REFLEX; Future  - thyroid (ARMOUR) 60 MG tablet; Take 1 tablet (60 mg) by mouth daily.    Screening cholesterol level  - Lipid panel reflex to direct LDL Non-fasting; Future    Screening for diabetes mellitus  - Hemoglobin A1c; Future    Follow-up in 1-2 months:  -blood pressure check  -pap       BMI  Estimated body mass index is 46.35 kg/m  as calculated from the following:    Height as of this encounter: 1.778 m (5' 10\").    Weight as of this encounter: 146.5 kg (323 lb).             Jayy Jennings is a 59 year old, presenting for the following health issues:  Follow Up (C diff)        11/15/2024     9:01 AM   Additional Questions   Roomed by Teri HYATT         History of Present Illness       Reason for visit:  Follow up c-diff and colonoscopy    She eats 2-3 servings of fruits and vegetables daily.She consumes 0 sweetened beverage(s) daily.She exercises with enough effort to increase her heart rate 10 to 19 minutes per day.  She exercises with enough effort to increase her heart rate 3 or less days per week. She is missing 1 dose(s) of medications per week.  She is not taking prescribed medications regularly due to remembering to take.     Pt states she has been doing better but still experiencing some symptoms. Has been having diarrhea every other week, and is becoming less often, but " "still there. Is still struggling to eat with stomach feeling icky and sometimes painful, and especially happens when she eats too fast.                  Objective    BP (!) 156/84 (BP Location: Right arm, Patient Position: Sitting, Cuff Size: Adult Large)   Pulse 61   Temp 97.6  F (36.4  C) (Oral)   Resp 13   Ht 1.778 m (5' 10\")   Wt 146.5 kg (323 lb)   LMP  (LMP Unknown)   SpO2 98%   BMI 46.35 kg/m    Body mass index is 46.35 kg/m .  Physical Exam   GEN: Alert and appropriately interactive for age  EYES: Eyes grossly normal to inspection, conjunctivae and sclerae normal  RESP: Breathing comfortably on room air   CV: Warm and well perfused peripheral extremities   MS: no gross musculoskeletal defects noted, no edema  NEURO: Alert and oriented x 4. Gait normal. Speech fluent.    PSYCH: Affect normal/bright. Appearance well groomed.               Signed Electronically by: Deirdre E. Milligan, MD    "

## 2024-11-18 ENCOUNTER — PATIENT OUTREACH (OUTPATIENT)
Dept: CARE COORDINATION | Facility: CLINIC | Age: 59
End: 2024-11-18
Payer: COMMERCIAL

## 2024-12-12 ENCOUNTER — MYC REFILL (OUTPATIENT)
Dept: PEDIATRICS | Facility: CLINIC | Age: 59
End: 2024-12-12
Payer: COMMERCIAL

## 2024-12-12 DIAGNOSIS — I10 ESSENTIAL HYPERTENSION: ICD-10-CM

## 2024-12-12 DIAGNOSIS — E03.9 HYPOTHYROIDISM, UNSPECIFIED TYPE: ICD-10-CM

## 2024-12-12 RX ORDER — THYROID 60 MG/1
60 TABLET ORAL DAILY
Qty: 90 TABLET | Refills: 4 | OUTPATIENT
Start: 2024-12-12

## 2024-12-12 RX ORDER — AMLODIPINE BESYLATE 5 MG/1
5 TABLET ORAL DAILY
Qty: 90 TABLET | Refills: 1 | OUTPATIENT
Start: 2024-12-12

## 2024-12-30 ENCOUNTER — ANCILLARY PROCEDURE (OUTPATIENT)
Dept: MAMMOGRAPHY | Facility: CLINIC | Age: 59
End: 2024-12-30
Attending: STUDENT IN AN ORGANIZED HEALTH CARE EDUCATION/TRAINING PROGRAM
Payer: COMMERCIAL

## 2024-12-30 DIAGNOSIS — Z12.31 VISIT FOR SCREENING MAMMOGRAM: ICD-10-CM

## 2024-12-30 PROCEDURE — 77063 BREAST TOMOSYNTHESIS BI: CPT | Mod: TC | Performed by: RADIOLOGY

## 2024-12-30 PROCEDURE — 77067 SCR MAMMO BI INCL CAD: CPT | Mod: TC | Performed by: RADIOLOGY

## 2025-03-18 ENCOUNTER — OFFICE VISIT (OUTPATIENT)
Dept: PEDIATRICS | Facility: CLINIC | Age: 60
End: 2025-03-18
Payer: COMMERCIAL

## 2025-03-18 VITALS
DIASTOLIC BLOOD PRESSURE: 76 MMHG | SYSTOLIC BLOOD PRESSURE: 136 MMHG | HEIGHT: 70 IN | TEMPERATURE: 97.1 F | RESPIRATION RATE: 18 BRPM | OXYGEN SATURATION: 97 % | BODY MASS INDEX: 41.95 KG/M2 | HEART RATE: 67 BPM | WEIGHT: 293 LBS

## 2025-03-18 DIAGNOSIS — R73.03 PREDIABETES: ICD-10-CM

## 2025-03-18 DIAGNOSIS — M25.512 CHRONIC PAIN OF BOTH SHOULDERS: ICD-10-CM

## 2025-03-18 DIAGNOSIS — G89.29 CHRONIC PAIN OF BOTH SHOULDERS: ICD-10-CM

## 2025-03-18 DIAGNOSIS — M25.511 CHRONIC PAIN OF BOTH SHOULDERS: ICD-10-CM

## 2025-03-18 DIAGNOSIS — I10 ESSENTIAL HYPERTENSION: Primary | ICD-10-CM

## 2025-03-18 PROCEDURE — 99214 OFFICE O/P EST MOD 30 MIN: CPT | Performed by: PHYSICIAN ASSISTANT

## 2025-03-18 PROCEDURE — 1125F AMNT PAIN NOTED PAIN PRSNT: CPT | Performed by: PHYSICIAN ASSISTANT

## 2025-03-18 PROCEDURE — 3075F SYST BP GE 130 - 139MM HG: CPT | Performed by: PHYSICIAN ASSISTANT

## 2025-03-18 PROCEDURE — 3078F DIAST BP <80 MM HG: CPT | Performed by: PHYSICIAN ASSISTANT

## 2025-03-18 RX ORDER — LISINOPRIL 10 MG/1
10 TABLET ORAL DAILY
Qty: 90 TABLET | Refills: 0 | Status: SHIPPED | OUTPATIENT
Start: 2025-03-18

## 2025-03-18 ASSESSMENT — ASTHMA QUESTIONNAIRES
QUESTION_2 LAST FOUR WEEKS HOW OFTEN HAVE YOU HAD SHORTNESS OF BREATH: NOT AT ALL
ACT_TOTALSCORE: 25
QUESTION_4 LAST FOUR WEEKS HOW OFTEN HAVE YOU USED YOUR RESCUE INHALER OR NEBULIZER MEDICATION (SUCH AS ALBUTEROL): NOT AT ALL
QUESTION_5 LAST FOUR WEEKS HOW WOULD YOU RATE YOUR ASTHMA CONTROL: COMPLETELY CONTROLLED
QUESTION_1 LAST FOUR WEEKS HOW MUCH OF THE TIME DID YOUR ASTHMA KEEP YOU FROM GETTING AS MUCH DONE AT WORK, SCHOOL OR AT HOME: NONE OF THE TIME
ACT_TOTALSCORE: 25
QUESTION_3 LAST FOUR WEEKS HOW OFTEN DID YOUR ASTHMA SYMPTOMS (WHEEZING, COUGHING, SHORTNESS OF BREATH, CHEST TIGHTNESS OR PAIN) WAKE YOU UP AT NIGHT OR EARLIER THAN USUAL IN THE MORNING: NOT AT ALL

## 2025-03-18 ASSESSMENT — PAIN SCALES - GENERAL: PAINLEVEL_OUTOF10: MILD PAIN (2)

## 2025-03-18 ASSESSMENT — PATIENT HEALTH QUESTIONNAIRE - PHQ9
10. IF YOU CHECKED OFF ANY PROBLEMS, HOW DIFFICULT HAVE THESE PROBLEMS MADE IT FOR YOU TO DO YOUR WORK, TAKE CARE OF THINGS AT HOME, OR GET ALONG WITH OTHER PEOPLE: SOMEWHAT DIFFICULT
SUM OF ALL RESPONSES TO PHQ QUESTIONS 1-9: 3
SUM OF ALL RESPONSES TO PHQ QUESTIONS 1-9: 3

## 2025-03-18 NOTE — PROGRESS NOTES
Assessment & Plan     Essential hypertension  Stop amlodipine and begin lisinopril. Send Bps in two weeks and can titrate from there.   - lisinopril (ZESTRIL) 10 MG tablet; Take 1 tablet (10 mg) by mouth daily.    Chronic pain of both shoulders  [place] [facility] [place]  - Orthopedic  Referral; Future    Prediabetes  Begin lifestyle changes. Patient will follow up in the fall. Appointment scheduled.    Jayy Jennings is a 60 year old, presenting for the following health issues:  Recheck Medication and Hypertension      3/18/2025     9:02 AM   Additional Questions   Roomed by Anahi   Accompanied by none         3/18/2025     9:02 AM   Patient Reported Additional Medications   Patient reports taking the following new medications none     History of Present Illness       Hypertension: She presents for follow up of hypertension.  She does not check blood pressure  regularly outside of the clinic. Outside blood pressures have been over 140/90. She does not follow a low salt diet.     She eats 2-3 servings of fruits and vegetables daily.She consumes 0 sweetened beverage(s) daily.She exercises with enough effort to increase her heart rate 9 or less minutes per day.  She exercises with enough effort to increase her heart rate 3 or less days per week. She is missing 1 dose(s) of medications per week.  She is not taking prescribed medications regularly due to remembering to take.      Patient here for follow up of Bps. She was started on amlodipine 5 mg daily. She checks BPs outside of clinic and ranges in the 130s/70s. Leg swelling L>R with amlodipine.     Patient has tolerated lisinopril in past. Discontinued due to weight loss.     In addition, patient has prediabetes. She has not made changes to lifestyle since labs completed. Not interested in diabetes education.     Patient complains of persistent left shoulder pain. Xrays completed with severe OA (at [facility]). Patient has pain  "more days than not. She now also has worsening right shoulder pain.       Review of Systems  Constitutional, HEENT, cardiovascular, pulmonary, gi and gu systems are negative, except as otherwise noted.      Objective    /76 (BP Location: Right arm, Patient Position: Sitting, Cuff Size: Adult Large)   Pulse 67   Temp 97.1  F (36.2  C) (Temporal)   Resp 18   Ht 1.778 m (5' 10\")   Wt (!) 144.8 kg (319 lb 3.2 oz)   LMP  (LMP Unknown)   SpO2 97%   BMI 45.80 kg/m    Body mass index is 45.8 kg/m .  Physical Exam   GENERAL: alert and no distress  RESP: lungs clear to auscultation - no rales, rhonchi or wheezes  CV: regular rate and rhythm, normal S1 S2, no S3 or S4  LE: bilateral leg edema    No results found for any visits on 03/18/25.        Signed Electronically by: Basia Burks PA-C    "

## 2025-03-18 NOTE — PATIENT INSTRUCTIONS
Stop amlodipine  Begin lisinopril  Check BPs and send update in 2 weeks  I can adjust dosing at that point  See you in Nov for appointment with Dr. Milligan

## 2025-03-19 ENCOUNTER — PATIENT OUTREACH (OUTPATIENT)
Dept: CARE COORDINATION | Facility: CLINIC | Age: 60
End: 2025-03-19
Payer: COMMERCIAL

## 2025-04-03 NOTE — PROGRESS NOTES
ASSESSMENT & PLAN    Michaela was seen today for pain and pain.    Diagnoses and all orders for this visit:    Primary osteoarthritis of both shoulders  -     Orthopedic  Referral  -     Large Joint Injection/Arthocentesis: bilateral glenohumeral    Chronic right shoulder pain  -     XR Shoulder Right G/E 3 Views; Future  -     Large Joint Injection/Arthocentesis: bilateral glenohumeral      This issue is chronic and Worsening.  Michaela presents to our clinic today to discuss her chronic, bilateral shoulder pain. Her history, exam findings, and imaging findings are consistent with her radiographically advanced glenohumeral osteoarthiritis as the main drivers of her symptoms.  We discussed these findings and treatment options including anti-inflammatory medicines, physical therapy, corticosteroid injections, and surgical intervention in the form of a shoulder replacement.  Reviewed with the patient that her limitations and range of motion are likely due to the mechanical changes in her joint, and  in there would be some limitation in range of motion regardless of any conservative management.  We also discussed that  there is a higher likelihood of improvement in range of motion and pain with a shoulder replacement, however this would come with a significant weight lifting restriction, which would limit her at her job.  After this discussion, the patient wished to trial conservative management with a corticosteroid injection and physical therapy before pursuing surgical intervention.  We determine the following plan:  - CSI to the bilateral shoulder glenohumeral joints performed today under ultrasound guidance, see procedure note below for details  - Physical therapy referral placed to work on range of motion and strengthening  - She can follow-up with me in 6 to 8 weeks if not improving for further evaluation and treatment    Joey Guerrero [facility] SP[facility]IC[facility]    -----  Chief  Complaint   Patient presents with    Left Shoulder - Pain    Right Shoulder - Pain       SUBJECTIVE  Michaela Johnson is a/an 60 year old female who is seen in consultation at the request of  Basia Ludwig PA-C for evaluation of bilateral shoulders.     The patient is seen by themselves.  The patient is Right handed    Onset: 1 years(s) ago . Reports insidious onset without acute precipitating event.  Location of Pain: bilateral shoulders  Worsened by: pulling arm out, reaching up  Better with: movement   Treatments tried: rest/activity avoidance, Tylenol, ibuprofen, physical therapy (not sure the number of  visits)virtual PT through employer, previous imaging (xray 04/24)of left shoulder, and corticosteroid injection (most recent date: April 24) that provided  1 month(s) of relief  Associated symptoms: tingling and weakness of arm    Orthopedic/Surgical history: NO  Social History/Occupation: [employer]       REVIEW OF SYSTEMS:  Review of systems negative unless mentioned in HPI     OBJECTIVE:  LMP  (LMP Unknown)    General: healthy, alert and in no distress  Skin: no suspicious lesions or rash.  CV: distal perfusion intact   Resp: normal respiratory effort without conversational dyspnea   Psych: normal mood and affect  Gait: NORMAL  Neuro: Normal light sensory exam of b/l upper extremity     Shoulder Examination (focused):   Left  Right     Skin intact intact   Inspection No erythema, ecchymosis  No erythema, ecchymosis    Palpation Tenderness: None Tenderness: None   Range of Motion (active) Forward flexion:120   GH Abduction: 60  Reach behind back: hip  Forward flexion:120   GH Abduction: 60  Reach behind back: hip   Range of Motion (passive) Forward flexion:120   GH Abduction: 60  ER at side: 30  ER at 90 deg abduction: NP  IR at 90 deg abduction: NP Forward flexion:130   GH Abduction: 60  ER at side: 50  ER at 90 deg abduction: 90  IR at 90 deg abduction: 60   Crepitus No No   Strength Internal  Rotation at side: 5+  External Rotation at side: 5+  Belly Press: 5+ Internal Rotation at side: 5+  External Rotation at side: 5+  Belly Press: 5+    Special Tests Murillo: NP  Neer: NP  Ivania: 5+  Speed's: 5+  Cross arm: Negative  Murillo: NP  Neer: NP  Ivania: 5+  Speed's: 5+  Cross arm: Negative      Other Positive Tests/ Notable Findings O'sharla's: NP  Bicep Load I/II: NP  Internal Impingement: NP  Yergason: NP   ERLS: NP  Hornblower: NP  Bear Hug: NP O'sharla's: NP  Bicep Load I/II: NP  Internal Impingement: NP  Yergason: NP   ERLS: NP  Hornblower: NP  Bear Hug: NP   Instability Generalized laxity: no  Anterior apprehension: Negative  Load and shift (anterior): NP  Load and shift (posterior): NP Generalized laxity: no  Anterior apprehension: Negative  Load and shift (anterior): NP  Load and shift (posterior): NP   Neurologic No abnormal sensation.   Scapular Motion Normal scapular alignment bilaterally without notable protraction/retraction, elevation/depression  No dynamic evidence of scapular dyskinesia           RADIOLOGY:  Final results and radiologist's interpretation, available in the [facility] health record.  Images were reviewed with the patient in the office today.  My personal interpretation of the performed imaging: Advanced joint space narrowing and osteophytosis of the right glenohumeral joint with a large osteophyte formed at the inferior humeral head.  Outside images of the left shoulder were reviewed and also show advanced joint space narrowing osteophytosis of the glenohumeral joint, more severe on the left compared to the right.    Large Joint Injection/Arthocentesis: bilateral glenohumeral    Date/Time: 4/8/2025 8:38 AM    Performed by: Joey Guerrero DO  Authorized by: Joey Guerrero DO    Indications:  Pain and osteoarthritis  Needle Size:  22 G  Guidance: ultrasound    Approach:  Posterior  Location:  Shoulder  Laterality:  Bilateral      Site:  Bilateral glenohumeral  Medications (Right):  6  mg betamethasone acet & sod phos 6 (3-3) MG/ML; 5 mL lidocaine 1 %; 2 mL ROPivacaine 5 MG/ML   Medications (Right) comment:  1ml of 8.4% Sodium Bicarbonate solution was used to buffer the local numbing agent for today's injection    Medications (Left):  6 mg betamethasone acet & sod phos 6 (3-3) MG/ML; 5 mL lidocaine 1 %; 2 mL ROPivacaine 5 MG/ML   Medications (Left) comment:  1ml of 8.4% Sodium Bicarbonate solution was used to buffer the local numbing agent for today's injection    Outcome:  Tolerated well, no immediate complications  Procedure discussed: discussed risks, benefits, and alternatives    Consent Given by:  Patient  Timeout: timeout called immediately prior to procedure    Prep: patient was prepped and draped in usual sterile fashion     Ultrasound was used to ensure safe and accurate needle placement and injection. Ultrasound images of the procedure were permanently stored.

## 2025-04-08 ENCOUNTER — ANCILLARY PROCEDURE (OUTPATIENT)
Dept: GENERAL RADIOLOGY | Facility: CLINIC | Age: 60
End: 2025-04-08
Attending: STUDENT IN AN ORGANIZED HEALTH CARE EDUCATION/TRAINING PROGRAM
Payer: COMMERCIAL

## 2025-04-08 ENCOUNTER — OFFICE VISIT (OUTPATIENT)
Dept: ORTHOPEDICS | Facility: CLINIC | Age: 60
End: 2025-04-08
Attending: PHYSICIAN ASSISTANT
Payer: COMMERCIAL

## 2025-04-08 DIAGNOSIS — M25.511 CHRONIC RIGHT SHOULDER PAIN: ICD-10-CM

## 2025-04-08 DIAGNOSIS — G89.29 CHRONIC RIGHT SHOULDER PAIN: ICD-10-CM

## 2025-04-08 DIAGNOSIS — M19.012 PRIMARY OSTEOARTHRITIS OF BOTH SHOULDERS: Primary | ICD-10-CM

## 2025-04-08 DIAGNOSIS — M19.011 PRIMARY OSTEOARTHRITIS OF BOTH SHOULDERS: Primary | ICD-10-CM

## 2025-04-08 PROCEDURE — 20611 DRAIN/INJ JOINT/BURSA W/US: CPT | Mod: 50 | Performed by: STUDENT IN AN ORGANIZED HEALTH CARE EDUCATION/TRAINING PROGRAM

## 2025-04-08 PROCEDURE — 99204 OFFICE O/P NEW MOD 45 MIN: CPT | Mod: 25 | Performed by: STUDENT IN AN ORGANIZED HEALTH CARE EDUCATION/TRAINING PROGRAM

## 2025-04-08 PROCEDURE — 73030 X-RAY EXAM OF SHOULDER: CPT | Mod: TC | Performed by: STUDENT IN AN ORGANIZED HEALTH CARE EDUCATION/TRAINING PROGRAM

## 2025-04-08 RX ORDER — LIDOCAINE HYDROCHLORIDE 10 MG/ML
5 INJECTION, SOLUTION INFILTRATION; PERINEURAL
Status: COMPLETED | OUTPATIENT
Start: 2025-04-08 | End: 2025-04-08

## 2025-04-08 RX ORDER — BETAMETHASONE SODIUM PHOSPHATE AND BETAMETHASONE ACETATE 3; 3 MG/ML; MG/ML
6 INJECTION, SUSPENSION INTRA-ARTICULAR; INTRALESIONAL; INTRAMUSCULAR; SOFT TISSUE
Status: COMPLETED | OUTPATIENT
Start: 2025-04-08 | End: 2025-04-08

## 2025-04-08 RX ORDER — ROPIVACAINE HYDROCHLORIDE 5 MG/ML
2 INJECTION, SOLUTION EPIDURAL; INFILTRATION; PERINEURAL
Status: COMPLETED | OUTPATIENT
Start: 2025-04-08 | End: 2025-04-08

## 2025-04-08 RX ADMIN — ROPIVACAINE HYDROCHLORIDE 2 ML: 5 INJECTION, SOLUTION EPIDURAL; INFILTRATION; PERINEURAL at 08:38

## 2025-04-08 RX ADMIN — BETAMETHASONE SODIUM PHOSPHATE AND BETAMETHASONE ACETATE 6 MG: 3; 3 INJECTION, SUSPENSION INTRA-ARTICULAR; INTRALESIONAL; INTRAMUSCULAR; SOFT TISSUE at 08:38

## 2025-04-08 RX ADMIN — LIDOCAINE HYDROCHLORIDE 5 ML: 10 INJECTION, SOLUTION INFILTRATION; PERINEURAL at 08:38

## 2025-04-08 NOTE — LETTER
4/8/2025      Michaela Johnson  [address] [facility] [address]      Dear Colleague,    Thank you for referring your patient, Michaela Johnson, to the [facility] [place]. Please see a copy of my visit note below.    ASSESSMENT & PLAN    Michaela was seen today for pain and pain.    Diagnoses and all orders for this visit:    Primary osteoarthritis of both shoulders  -     Orthopedic  Referral  -     Large Joint Injection/Arthocentesis: bilateral glenohumeral    Chronic right shoulder pain  -     XR Shoulder Right G/E 3 Views; Future  -     Large Joint Injection/Arthocentesis: bilateral glenohumeral      This issue is chronic and Worsening.  Michaela presents to our clinic today to discuss her chronic, bilateral shoulder pain. Her history, exam findings, and imaging findings are consistent with her radiographically advanced glenohumeral osteoarthiritis as the main drivers of her symptoms.  We discussed these findings and treatment options including anti-inflammatory medicines, physical therapy, corticosteroid injections, and surgical intervention in the form of a shoulder replacement.  Reviewed with the patient that her limitations and range of motion are likely due to the mechanical changes in her joint, and  in there would be some limitation in range of motion regardless of any conservative management.  We also discussed that  there is a higher likelihood of improvement in range of motion and pain with a shoulder replacement, however this would come with a significant weight lifting restriction, which would limit her at her job.  After this discussion, the patient wished to trial conservative management with a corticosteroid injection and physical therapy before pursuing surgical intervention.  We determine the following plan:  - CSI to the bilateral shoulder glenohumeral joints performed today under ultrasound guidance, see procedure note below for details  - Physical  therapy referral placed to work on range of motion and strengthening  - She can follow-up with me in 6 to 8 weeks if not improving for further evaluation and treatment    Joey Guerrero DO  [facility] [place]    -----  Chief Complaint   Patient presents with     Left Shoulder - Pain     Right Shoulder - Pain       SUBJECTIVE  Michaela Johnson is a/an 60 year old female who is seen in consultation at the request of  Basia Ludwig PA-C for evaluation of bilateral shoulders.     The patient is seen by themselves.  The patient is Right handed    Onset: 1 years(s) ago . Reports insidious onset without acute precipitating event.  Location of Pain: bilateral shoulders  Worsened by: pulling arm out, reaching up  Better with: movement   Treatments tried: rest/activity avoidance, Tylenol, ibuprofen, physical therapy (not sure the number of  visits)virtual PT through employer, previous imaging (xray 04/24)of left shoulder, and corticosteroid injection (most recent date: April 24) that provided  1 month(s) of relief  Associated symptoms: tingling and weakness of arm    Orthopedic/Surgical history: NO  Social History/Occupation: [employer]       REVIEW OF SYSTEMS:  Review of systems negative unless mentioned in HPI     OBJECTIVE:  LMP  (LMP Unknown)    General: healthy, alert and in no distress  Skin: no suspicious lesions or rash.  CV: distal perfusion intact   Resp: normal respiratory effort without conversational dyspnea   Psych: normal mood and affect  Gait: NORMAL  Neuro: Normal light sensory exam of b/l upper extremity     Shoulder Examination (focused):   Left  Right     Skin intact intact   Inspection No erythema, ecchymosis  No erythema, ecchymosis    Palpation Tenderness: None Tenderness: None   Range of Motion (active) Forward flexion:120   GH Abduction: 60  Reach behind back: hip  Forward flexion:120   GH Abduction: 60  Reach behind back: hip   Range of Motion (passive)  Forward flexion:120   GH Abduction: 60  ER at side: 30  ER at 90 deg abduction: NP  IR at 90 deg abduction: NP Forward flexion:130   GH Abduction: 60  ER at side: 50  ER at 90 deg abduction: 90  IR at 90 deg abduction: 60   Crepitus No No   Strength Internal Rotation at side: 5+  External Rotation at side: 5+  Belly Press: 5+ Internal Rotation at side: 5+  External Rotation at side: 5+  Belly Press: 5+    Special Tests Murillo: NP  Neer: NP  Ivania: 5+  Speed's: 5+  Cross arm: Negative  Murillo: NP  Neer: NP  Ivania: 5+  Speed's: 5+  Cross arm: Negative      Other Positive Tests/ Notable Findings O'sharla's: NP  Bicep Load I/II: NP  Internal Impingement: NP  Yergason: NP   ERLS: NP  Hornblower: NP  Bear Hug: NP O'sharla's: NP  Bicep Load I/II: NP  Internal Impingement: NP  Yergason: NP   ERLS: NP  Hornblower: NP  Bear Hug: NP   Instability Generalized laxity: no  Anterior apprehension: Negative  Load and shift (anterior): NP  Load and shift (posterior): NP Generalized laxity: no  Anterior apprehension: Negative  Load and shift (anterior): NP  Load and shift (posterior): NP   Neurologic No abnormal sensation.   Scapular Motion Normal scapular alignment bilaterally without notable protraction/retraction, elevation/depression  No dynamic evidence of scapular dyskinesia           RADIOLOGY:  Final results and radiologist's interpretation, available in the [facility] health record.  Images were reviewed with the patient in the office today.  My personal interpretation of the performed imaging: Advanced joint space narrowing and osteophytosis of the right glenohumeral joint with a large osteophyte formed at the inferior humeral head.  Outside images of the left shoulder were reviewed and also show advanced joint space narrowing osteophytosis of the glenohumeral joint, more severe on the left compared to the right.    Large Joint Injection/Arthocentesis: bilateral glenohumeral    Date/Time: 4/8/2025 8:38 AM    Performed by: Cesar  DO Joey  Authorized by: Joey Guerrero DO    Indications:  Pain and osteoarthritis  Needle Size:  22 G  Guidance: ultrasound    Approach:  Posterior  Location:  Shoulder  Laterality:  Bilateral      Site:  Bilateral glenohumeral  Medications (Right):  6 mg betamethasone acet & sod phos 6 (3-3) MG/ML; 5 mL lidocaine 1 %; 2 mL ROPivacaine 5 MG/ML   Medications (Right) comment:  1ml of 8.4% Sodium Bicarbonate solution was used to buffer the local numbing agent for today's injection    Medications (Left):  6 mg betamethasone acet & sod phos 6 (3-3) MG/ML; 5 mL lidocaine 1 %; 2 mL ROPivacaine 5 MG/ML   Medications (Left) comment:  1ml of 8.4% Sodium Bicarbonate solution was used to buffer the local numbing agent for today's injection    Outcome:  Tolerated well, no immediate complications  Procedure discussed: discussed risks, benefits, and alternatives    Consent Given by:  Patient  Timeout: timeout called immediately prior to procedure    Prep: patient was prepped and draped in usual sterile fashion     Ultrasound was used to ensure safe and accurate needle placement and injection. Ultrasound images of the procedure were permanently stored.            Again, thank you for allowing me to participate in the care of your patient.        Sincerely,        Joey Guerrero DO    Electronically signed

## 2025-05-14 ENCOUNTER — MYC REFILL (OUTPATIENT)
Dept: PEDIATRICS | Facility: CLINIC | Age: 60
End: 2025-05-14
Payer: COMMERCIAL

## 2025-05-14 DIAGNOSIS — I10 ESSENTIAL HYPERTENSION: ICD-10-CM

## 2025-05-14 RX ORDER — LISINOPRIL 10 MG/1
10 TABLET ORAL DAILY
Qty: 90 TABLET | Refills: 1 | Status: SHIPPED | OUTPATIENT
Start: 2025-05-14

## 2025-05-15 RX ORDER — LISINOPRIL 20 MG/1
20 TABLET ORAL DAILY
Qty: 90 TABLET | Refills: 1 | Status: SHIPPED | OUTPATIENT
Start: 2025-05-15

## 2025-05-15 NOTE — TELEPHONE ENCOUNTER
Blood pressure above goal.  Recommend she increase to lisinopril 20mg daily and have a follow up appointment in 2-4 weeks.    Deirdre Milligan, MD  Internal Medicine & Pediatrics  [facility]  She/her

## 2025-05-15 NOTE — TELEPHONE ENCOUNTER
Notified Patient  of provider's message.     Patient is almost out of Lisinopril 10mg. Requesting new prescription for 20mg tablets. Order pended.    Patient will take two 10mg tablets until gone (a few days)    Person was given an opportunity to ask questions, verbalized understanding of plan, and is agreeable.     Amelie Redd RN

## 2025-06-27 ENCOUNTER — TELEPHONE (OUTPATIENT)
Dept: PEDIATRICS | Facility: CLINIC | Age: 60
End: 2025-06-27
Payer: COMMERCIAL

## 2025-06-27 NOTE — TELEPHONE ENCOUNTER
Attempted to reach patient to: Reschedule an appointment    When patient returns call, please take this action: Assist with rescheduling    Reason for the reschedule: LVM and mcm for pt to call [phone] to reschedule Lissy [facility]ONAD[facility] 6/30 appt.         If unable to reschedule: Transfer to TC line (or update telephone encounter in after-hours)

## 2025-07-15 ENCOUNTER — OFFICE VISIT (OUTPATIENT)
Dept: FAMILY MEDICINE | Facility: CLINIC | Age: 60
End: 2025-07-15
Payer: COMMERCIAL

## 2025-07-15 VITALS
BODY MASS INDEX: 41.95 KG/M2 | TEMPERATURE: 97.5 F | OXYGEN SATURATION: 96 % | SYSTOLIC BLOOD PRESSURE: 128 MMHG | RESPIRATION RATE: 16 BRPM | DIASTOLIC BLOOD PRESSURE: 85 MMHG | WEIGHT: 293 LBS | HEIGHT: 70 IN | HEART RATE: 65 BPM

## 2025-07-15 DIAGNOSIS — L60.8 TOENAIL DEFORMITY: ICD-10-CM

## 2025-07-15 DIAGNOSIS — I10 ESSENTIAL HYPERTENSION: Primary | ICD-10-CM

## 2025-07-15 LAB
ANION GAP SERPL CALCULATED.3IONS-SCNC: 8 MMOL/L (ref 7–15)
BUN SERPL-MCNC: 7.8 MG/DL (ref 8–23)
CALCIUM SERPL-MCNC: 8.9 MG/DL (ref 8.8–10.4)
CHLORIDE SERPL-SCNC: 104 MMOL/L (ref 98–107)
CREAT SERPL-MCNC: 0.72 MG/DL (ref 0.51–0.95)
EGFRCR SERPLBLD CKD-EPI 2021: >90 ML/MIN/1.73M2
GLUCOSE SERPL-MCNC: 125 MG/DL (ref 70–99)
HCO3 SERPL-SCNC: 28 MMOL/L (ref 22–29)
POTASSIUM SERPL-SCNC: 4.4 MMOL/L (ref 3.4–5.3)
SODIUM SERPL-SCNC: 140 MMOL/L (ref 135–145)

## 2025-07-15 PROCEDURE — G2211 COMPLEX E/M VISIT ADD ON: HCPCS | Performed by: PHYSICIAN ASSISTANT

## 2025-07-15 PROCEDURE — 3074F SYST BP LT 130 MM HG: CPT | Performed by: PHYSICIAN ASSISTANT

## 2025-07-15 PROCEDURE — 99214 OFFICE O/P EST MOD 30 MIN: CPT | Performed by: PHYSICIAN ASSISTANT

## 2025-07-15 PROCEDURE — 36415 COLL VENOUS BLD VENIPUNCTURE: CPT | Performed by: PHYSICIAN ASSISTANT

## 2025-07-15 PROCEDURE — 3079F DIAST BP 80-89 MM HG: CPT | Performed by: PHYSICIAN ASSISTANT

## 2025-07-15 PROCEDURE — 1126F AMNT PAIN NOTED NONE PRSNT: CPT | Performed by: PHYSICIAN ASSISTANT

## 2025-07-15 PROCEDURE — 80048 BASIC METABOLIC PNL TOTAL CA: CPT | Performed by: PHYSICIAN ASSISTANT

## 2025-07-15 RX ORDER — OMEGA-3 FATTY ACIDS/FISH OIL 300-1000MG
CAPSULE ORAL
COMMUNITY
Start: 2024-01-01

## 2025-07-15 RX ORDER — LISINOPRIL 20 MG/1
20 TABLET ORAL DAILY
Qty: 90 TABLET | Refills: 1 | Status: SHIPPED | OUTPATIENT
Start: 2025-07-15

## 2025-07-15 ASSESSMENT — PAIN SCALES - GENERAL: PAINLEVEL_OUTOF10: NO PAIN (0)

## 2025-07-15 NOTE — PROGRESS NOTES
Assessment & Plan     Essential hypertension  Controlled, continue present management. Follow-up with PCP in 4 months as scheduled.  - Basic metabolic panel  (Ca, Cl, CO2, Creat, Gluc, K, Na, BUN); Future  - lisinopril (ZESTRIL) 20 MG tablet; Take 1 tablet (20 mg) by mouth daily.  - Basic metabolic panel  (Ca, Cl, CO2, Creat, Gluc, K, Na, BUN)    Toenail deformity  Appearance consistent with onychomycosis given subungual hyperkeratosis and onycholysis of right great toenail. Right 2nd toenail and left 2nd toenail also thickened, discolored. Given level of separation of nail plate from nail bed right great toe, recommend seeing podiatry for evaluation and management. Keep toe covered to avoid toenail getting caught/ripped.  - Orthopedic  Referral; Future    Follow-up   With PCP for annual preventive visit scheduled 11/17/25    The longitudinal plan of care for the diagnosis(es)/condition(s) as documented were addressed during this visit. Due to the added complexity in care, I will continue to support Michaela in the subsequent management and with ongoing continuity of care.    Subjective   Michaela is a 60 year old, presenting for the following health issues:  Nail Problem and Recheck Medication        7/15/2025     1:54 PM   Additional Questions   Roomed by MR   Accompanied by Self         7/15/2025     1:54 PM   Patient Reported Additional Medications   Patient reports taking the following new medications NA     History of Present Illness       Reason for visit:  BP follow up and black toenail  Symptom onset:  More than a month  Symptoms include:  Discoloed toenail  Symptom intensity:  Moderate  Symptom progression:  Staying the same  Had these symptoms before:  Yes  Has tried/received treatment for these symptoms:  No  What makes it worse:  ?  What makes it better:  Did some drops and there was no change   She is taking medications regularly.     Discolored RT big toenail x a couple years  Thicker and hard to  "cut  She has tried toenail fungal topical treatment for a couple months but it made no difference    Hypertension Follow-up    Do you check your blood pressure regularly outside of the clinic? Yes occasionally, she doesn't always remember   Are you following a low salt diet? Yes  Are your blood pressures ever more than 140 on the top number (systolic) OR more   than 90 on the bottom number (diastolic), for example 140/90? Yes (rarely)    Readings are averaging around 110-120's systolic, 80's for diastolic  Feels well. No chest pain, shortness of breath, swelling in the extremities, palpitations, dizziness, headaches, blurred vision.     BP Readings from Last 2 Encounters:   07/15/25 128/85   03/18/25 136/76         Objective    /85   Pulse 65   Temp 97.5  F (36.4  C) (Oral)   Resp 16   Ht 1.778 m (5' 10\")   Wt (!) 143.6 kg (316 lb 9.6 oz)   LMP  (LMP Unknown)   SpO2 96%   BMI 45.43 kg/m    Body mass index is 45.43 kg/m .  Physical Exam   GENERAL: alert and no distress  EYES: Eyes grossly normal to inspection  RESP: lungs clear to auscultation - no rales, rhonchi or wheezes  CV: regular rate and rhythm, normal S1 S2, no S3 or S4, no murmur, click or rub, no peripheral edema  SKIN: nail discoloration, subungual hyperkeratosis, and onycholysis of right great toenail. Right 2nd toenail and left 2nd toenail also thickened, discolored.   PSYCH: mentation appears normal, affect normal/bright          Signed Electronically by: Gifty Kendall PA-C    "

## 2025-07-16 ENCOUNTER — PATIENT OUTREACH (OUTPATIENT)
Dept: CARE COORDINATION | Facility: CLINIC | Age: 60
End: 2025-07-16
Payer: COMMERCIAL

## 2025-07-22 ENCOUNTER — OFFICE VISIT (OUTPATIENT)
Dept: PODIATRY | Facility: CLINIC | Age: 60
End: 2025-07-22
Attending: PHYSICIAN ASSISTANT
Payer: COMMERCIAL

## 2025-07-22 VITALS — WEIGHT: 293 LBS | BODY MASS INDEX: 45.34 KG/M2

## 2025-07-22 DIAGNOSIS — B35.1 ONYCHOMYCOSIS: Primary | ICD-10-CM

## 2025-07-22 DIAGNOSIS — L60.3 ONYCHODYSTROPHY: ICD-10-CM

## 2025-07-22 PROCEDURE — 99203 OFFICE O/P NEW LOW 30 MIN: CPT | Performed by: PODIATRIST

## 2025-07-22 RX ORDER — TERBINAFINE HYDROCHLORIDE 250 MG/1
250 TABLET ORAL DAILY
Qty: 90 TABLET | Refills: 0 | Status: SHIPPED | OUTPATIENT
Start: 2025-07-22 | End: 2025-10-20

## 2025-07-22 NOTE — PROGRESS NOTES
PATIENT HISTORY:  Gifty PACE requested I see this patient for their foot issue.      Michaela Johnson is a 60 year old female who presents to clinic for Toenail fungus specifically the right great toenail for over a couple of years.  She notes discoloration, thickening, lifting of the toenail from the nailbed.  She will have pain with direct pressure to the toenail.  She has tried multiple topical antifungal therapies with no change or improvement to the toenail.  She has noted slow progression to other toenails including both of her second toenails. She is seeking other treatments at this time.     Review of Systems:   ROS: 10 point ROS neg other than the symptoms noted above in the HPI.     PAST MEDICAL HISTORY:   Past Medical History:   Diagnosis Date    Arthritis     both knees    Essential hypertension 12/8/2006    Hashimoto's disease     History of blood transfusion 1999    Hyperglycemia     Obesity     EVA (obstructive sleep apnea)     Palpitations     Sleep apnea     CPAP    Ulcerative colitis (H)     Uncomplicated asthma     assoc with cats, dust and really cold weather        PAST SURGICAL HISTORY:   Past Surgical History:   Procedure Laterality Date    ARTHROPLASTY KNEE Right 10/18/2021    Procedure: Right total knee arthroplasty using an Arthrex iBalance knee system;  Surgeon: Jenaro Jewell MD;  Location:  OR    CHOLECYSTECTOMY  2006    COLONOSCOPY N/A 10/15/2024    Procedure: Colonoscopy;  Surgeon: Aly Sarkar MD;  Location:  GI    ENT SURGERY  1994    sinus with T&A    GYN SURGERY  1999    D&C    HERNIORRHAPHY UMBILICAL N/A 3/23/2022    Procedure: INCISIONAL HERNIA REPAIR;  Surgeon: Vasquez Pope MD;  Location:  OR        MEDICATIONS:   Current Outpatient Medications:     acetaminophen (TYLENOL) 325 MG tablet, Take 2 tablets (650 mg) by mouth every 4 hours as needed for other (mild pain), Disp: 100 tablet, Rfl: 0    ibuprofen (ADVIL/MOTRIN) 200 MG capsule, , Disp: ,  Rfl:     lisinopril (ZESTRIL) 20 MG tablet, Take 1 tablet (20 mg) by mouth daily., Disp: 90 tablet, Rfl: 1    thyroid (ARMOUR) 60 MG tablet, Take 1 tablet (60 mg) by mouth daily., Disp: 90 tablet, Rfl: 4    VITAMIN D, CHOLECALCIFEROL, PO, Take 5,000 Units by mouth daily  (Patient not taking: Reported on 7/15/2025), Disp: , Rfl:      ALLERGIES:    Allergies   Allergen Reactions    Amlodipine      Leg swelling    Cats     Dust Mites     Erythromycin Nausea        SOCIAL HISTORY:   Social History     Socioeconomic History    Marital status:      Spouse name: Not on file    Number of children: Not on file    Years of education: Not on file    Highest education level: Not on file   Occupational History    Not on file   Tobacco Use    Smoking status: Never    Smokeless tobacco: Never   Vaping Use    Vaping status: Never Used   Substance and Sexual Activity    Alcohol use: Yes     Comment: maybe once a month     Drug use: No    Sexual activity: Not on file   Other Topics Concern    Parent/sibling w/ CABG, MI or angioplasty before 65F 55M? Not Asked   Social History Narrative    Not on file     Social Drivers of Health     Financial Resource Strain: Low Risk  (9/12/2024)    Financial Resource Strain     Within the past 12 months, have you or your family members you live with been unable to get utilities (heat, electricity) when it was really needed?: No   Food Insecurity: Low Risk  (9/12/2024)    Food Insecurity     Within the past 12 months, did you worry that your food would run out before you got money to buy more?: No     Within the past 12 months, did the food you bought just not last and you didn t have money to get more?: No   Transportation Needs: Low Risk  (9/12/2024)    Transportation Needs     Within the past 12 months, has lack of transportation kept you from medical appointments, getting your medicines, non-medical meetings or appointments, work, or from getting things that you need?: No   Physical  Activity: Not on file   Stress: Not on file   Social Connections: Not on file   Interpersonal Safety: Low Risk  (3/18/2025)    Interpersonal Safety     Do you feel physically and emotionally safe where you currently live?: Yes     Within the past 12 months, have you been hit, slapped, kicked or otherwise physically hurt by someone?: No     Within the past 12 months, have you been humiliated or emotionally abused in other ways by your partner or ex-partner?: No   Housing Stability: Low Risk  (9/12/2024)    Housing Stability     Do you have housing? : Yes     Are you worried about losing your housing?: No        FAMILY HISTORY:   Family History   Adopted: Yes   Problem Relation Age of Onset    Unknown/Adopted Mother     Unknown/Adopted Father         EXAM:Vitals: LMP  (LMP Unknown)   BMI= There is no height or weight on file to calculate BMI.    General appearance: Patient is alert and fully cooperative with history & exam.  No sign of distress is noted during the visit.     Psychiatric: Affect is pleasant & appropriate.  Patient appears motivated to improve health.     Respiratory: Breathing is regular & unlabored while sitting.     HEENT: Hearing is intact to spoken word.  Speech is clear.  No gross evidence of visual impairment that would impact ambulation.      Lower Extremity Focused:    Dermatologic: toenails x 3 right hallux and bilateral 2nd toenails,  thickened, discolored yellow, + subungual debris, + dystrophic, minimal to no pain with palpation. no interdigital maceration, bilateral scaling plantar feet     Vascular: DP pulse 2/4 right 2/4 left PT pulse 2/4 right 2/4 left.  No significant edema or varicosities noted.  CFT and skin temperature is normal to both lower extremities.     Neurologic: Lower extremity sensation is intact to light touch.  No evidence of weakness or contracture in the lower extremities.  No evidence of neuropathy.     Musculoskeletal: decreased arch height bilateral. Reduced 1st  metatarsal phalangeal joint bilateral.     Radiographs:  I personally reviewed the xrays.   Labs:     2024   AST  0 - 45 U/L 28     21 R    ALT  0 - 50 U/L 20          ASSESSMENT:   Onychomycosis vs onychodystrophy toenails      Medical Decision Making/Plan:  Reviewed patient's chart in epic.  Discussed causes and treatments of nail fungus.  Explained that even if a culture comes back negative, a patient could still have nail fungus.  Discussed treatment options with patient and explained that there isn't one treatment that is 100% effective.  Discussed oral lamisil which is the most effective at about 70% but which can have liver effects.  Explained that if she wanted to try this that she would need serial blood draws to test her liver function.  Discussed over the counter antifungal creams.  Explained that these are about 50% effective and need to be applied once a day for about 6-8months.  Also talked about prescription penlac which is a nail laquer.  Again this is also only 50% effective.  Also discussed that if there was damage to the nail and the nail is now dystrophic that non of the above is going to change the nail.  If there was damage, there is note anything that can be done for the nail to correct it.  Discussed that if it becomes painful, we can remove the nail in clinic.    Prescription for Oral Lamisil 250 mg 1 tab daily x 3 months. Discussed possible failure of the oral lamisil. Continue with over-the-counter antifungal therapy of choice.   Reviewed prior labs including ALT, AST that were within normal limits from 2024. New labs ordered ALT/AST today I will contact via my Chart for results  Nail service information   Follow-up as needed       All questions were answered to patients satisfaction and they will call with further questions or concerns.       Kathi Hadley DPM

## 2025-07-22 NOTE — LETTER
7/22/2025      Michaela Johnson  [address] [address]      Dear Colleague,    Thank you for referring your patient, Michaela Johnson, to the [facility]. Please see a copy of my visit note below.    PATIENT HISTORY:  [facility] requested I see this patient for their foot issue.      Michaela Johnson is a 60 year old female who presents to clinic for Toenail fungus specifically the right great toenail for over a couple of years.  She notes discoloration, thickening, lifting of the toenail from the nailbed.  She will have pain with direct pressure to the toenail.  She has tried multiple topical antifungal therapies with no change or improvement to the toenail.  She has noted slow progression to other toenails including both of her second toenails. She is seeking other treatments at this time.     Review of Systems:   ROS: 10 point ROS neg other than the symptoms noted above in the HPI.     PAST MEDICAL HISTORY:   Past Medical History:   Diagnosis Date     Arthritis     both knees     Essential hypertension 12/8/2006     Hashimoto's disease      History of blood transfusion 1999     Hyperglycemia      Obesity      EVA (obstructive sleep apnea)      Palpitations      Sleep apnea     CPAP     Ulcerative colitis (H)      Uncomplicated asthma     assoc with cats, dust and really cold weather        PAST SURGICAL HISTORY:   Past Surgical History:   Procedure Laterality Date     ARTHROPLASTY KNEE Right 10/18/2021    Procedure: Right total knee arthroplasty using an Arthrex iBalance knee system;  Surgeon: Jenaro Jewell MD;  Location:  OR     CHOLECYSTECTOMY  2006     COLONOSCOPY N/A 10/15/2024    Procedure: Colonoscopy;  Surgeon: Aly Sarkar MD;  Location:  GI     ENT SURGERY  1994    sinus with T&A     GYN SURGERY  1999    D&C     HERNIORRHAPHY UMBILICAL N/A 3/23/2022    Procedure: INCISIONAL HERNIA REPAIR;  Surgeon: Vasquez Pope MD;   Location: [facility] OR        MEDICATIONS:   Current Outpatient Medications:      acetaminophen (TYLENOL) 325 MG tablet, Take 2 tablets (650 mg) by mouth every 4 hours as needed for other (mild pain), Disp: 100 tablet, Rfl: 0     ibuprofen (ADVIL/MOTRIN) 200 MG capsule, , Disp: , Rfl:      lisinopril (ZESTRIL) 20 MG tablet, Take 1 tablet (20 mg) by mouth daily., Disp: 90 tablet, Rfl: 1     thyroid (ARMOUR) 60 MG tablet, Take 1 tablet (60 mg) by mouth daily., Disp: 90 tablet, Rfl: 4     VITAMIN D, CHOLECALCIFEROL, PO, Take 5,000 Units by mouth daily  (Patient not taking: Reported on 7/15/2025), Disp: , Rfl:      ALLERGIES:    Allergies   Allergen Reactions     Amlodipine      Leg swelling     Cats      Dust Mites      Erythromycin Nausea        SOCIAL HISTORY:   Social History     Socioeconomic History     Marital status:      Spouse name: Not on file     Number of children: Not on file     Years of education: Not on file     Highest education level: Not on file   Occupational History     Not on file   Tobacco Use     Smoking status: Never     Smokeless tobacco: Never   Vaping Use     Vaping status: Never Used   Substance and Sexual Activity     Alcohol use: Yes     Comment: maybe once a month      Drug use: No     Sexual activity: Not on file   Other Topics Concern     Parent/sibling w/ CABG, MI or angioplasty before 65F 55M? Not Asked   Social History Narrative     Not on file     Social Drivers of Health     Financial Resource Strain: Low Risk  (9/12/2024)    Financial Resource Strain      Within the past 12 months, have you or your family members you live with been unable to get utilities (heat, electricity) when it was really needed?: No   Food Insecurity: Low Risk  (9/12/2024)    Food Insecurity      Within the past 12 months, did you worry that your food would run out before you got money to buy more?: No      Within the past 12 months, did the food you bought just not last and you didn t have money to get  more?: No   Transportation Needs: Low Risk  (9/12/2024)    Transportation Needs      Within the past 12 months, has lack of transportation kept you from medical appointments, getting your medicines, non-medical meetings or appointments, work, or from getting things that you need?: No   Physical Activity: Not on file   Stress: Not on file   Social Connections: Not on file   Interpersonal Safety: Low Risk  (3/18/2025)    Interpersonal Safety      Do you feel physically and emotionally safe where you currently live?: Yes      Within the past 12 months, have you been hit, slapped, kicked or otherwise physically hurt by someone?: No      Within the past 12 months, have you been humiliated or emotionally abused in other ways by your partner or ex-partner?: No   Housing Stability: Low Risk  (9/12/2024)    Housing Stability      Do you have housing? : Yes      Are you worried about losing your housing?: No        FAMILY HISTORY:   Family History   Adopted: Yes   Problem Relation Age of Onset     Unknown/Adopted Mother      Unknown/Adopted Father         EXAM:Vitals: LMP  (LMP Unknown)   BMI= There is no height or weight on file to calculate BMI.    General appearance: Patient is alert and fully cooperative with history & exam.  No sign of distress is noted during the visit.     Psychiatric: Affect is pleasant & appropriate.  Patient appears motivated to improve health.     Respiratory: Breathing is regular & unlabored while sitting.     HEENT: Hearing is intact to spoken word.  Speech is clear.  No gross evidence of visual impairment that would impact ambulation.      Lower Extremity Focused:    Dermatologic: toenails x 3 right hallux and bilateral 2nd toenails,  thickened, discolored yellow, + subungual debris, + dystrophic, minimal to no pain with palpation. no interdigital maceration, bilateral scaling plantar feet     Vascular: DP pulse 2/4 right 2/4 left PT pulse 2/4 right 2/4 left.  No significant edema or  varicosities noted.  CFT and skin temperature is normal to both lower extremities.     Neurologic: Lower extremity sensation is intact to light touch.  No evidence of weakness or contracture in the lower extremities.  No evidence of neuropathy.     Musculoskeletal: decreased arch height bilateral. Reduced 1st metatarsal phalangeal joint bilateral.     Radiographs:  I personally reviewed the xrays.   Labs:     2024   AST  0 - 45 U/L 28     21 R    ALT  0 - 50 U/L 20          ASSESSMENT:   Onychomycosis vs onychodystrophy toenails      Medical Decision Making/Plan:  Reviewed patient's chart in epic.  Discussed causes and treatments of nail fungus.  Explained that even if a culture comes back negative, a patient could still have nail fungus.  Discussed treatment options with patient and explained that there isn't one treatment that is 100% effective.  Discussed oral lamisil which is the most effective at about 70% but which can have liver effects.  Explained that if she wanted to try this that she would need serial blood draws to test her liver function.  Discussed over the counter antifungal creams.  Explained that these are about 50% effective and need to be applied once a day for about 6-8months.  Also talked about prescription penlac which is a nail laquer.  Again this is also only 50% effective.  Also discussed that if there was damage to the nail and the nail is now dystrophic that non of the above is going to change the nail.  If there was damage, there is note anything that can be done for the nail to correct it.  Discussed that if it becomes painful, we can remove the nail in clinic.    Prescription for Oral Lamisil 250 mg 1 tab daily x 3 months. Discussed possible failure of the oral lamisil. Continue with over-the-counter antifungal therapy of choice.   Reviewed prior labs including ALT, AST that were within normal limits from 2024. New labs ordered ALT/AST today I will contact via my Chart for results  Nail  service information   Follow-up as needed       All questions were answered to patients satisfaction and they will call with further questions or concerns.       Kathi Hadley DPM      Again, thank you for allowing me to participate in the care of your patient.        Sincerely,        Kathi Hadley DPM    Electronically signed

## 2025-07-22 NOTE — PATIENT INSTRUCTIONS
Thank you for choosing [facility] Podiatry / Foot & Ankle Surgery!    [facility] CL[facility]:  [facility]   [address]   [place] [address]   (Mon, Tues)     [facility]  [address], [place] [address]  (Friday)    [facility]  [address]  [place] [address]  (Wednesdays)       TRIAGE LINE: [phone]  APPOINTMENTS: [phone]  RADIOLOGY: [phone])  SET UP SURGERY: [phone]  PHYSICAL THERAPY: [phone]   BILLING QUESTIONS: [phone]  FAX: [phone]             Here is a list of routine foot care resources, which includes toenail trimming and callus/corn management.     This is not a referral. It is your responsibility to contact the organization and your insurance to confirm cost and coverage.      ROUTINE FOOT CARE (NAIL TRIMMING / CALLUSES)      [facility]  [phone]   [facility]  [phone]  Multiple locations   [facility]  [phone] Dr. Turner and Dr. Aguirre  [address]  [place] [address] [phone]   [facility]  [phone]  [email or web address]

## 2025-08-18 DIAGNOSIS — I10 ESSENTIAL HYPERTENSION: ICD-10-CM

## 2025-08-18 RX ORDER — LISINOPRIL 20 MG/1
20 TABLET ORAL DAILY
Qty: 90 TABLET | Refills: 1 | Status: SHIPPED | OUTPATIENT
Start: 2025-08-18

## (undated) DEVICE — SU VICRYL 4-0 P-3 18" UND J494G

## (undated) DEVICE — DRAIN JACKSON PRATT RESERVOIR 100ML SU130-1305

## (undated) DEVICE — GLOVE PROTEXIS W/NEU-THERA 7.5  2D73TE75

## (undated) DEVICE — BNDG ABDOMINAL BINDER 9X45-62" 79-89071

## (undated) DEVICE — LINEN HALF SHEET 5512

## (undated) DEVICE — SUCTION MANIFOLD NEPTUNE 2 SYS 4 PORT 0702-020-000

## (undated) DEVICE — NDL 22GA 1.5"

## (undated) DEVICE — BLADE SAW SAGITTAL STRK 18X90X1.27MM HD SYS 6 6118-127-090

## (undated) DEVICE — GLOVE PROTEXIS BLUE W/NEU-THERA 7.0  2D73EB70

## (undated) DEVICE — SU PDS II 0 CT-2 27" Z334H

## (undated) DEVICE — ESU GROUND PAD ADULT W/CORD E7507

## (undated) DEVICE — BLADE SAW SAGITTAL STRK 25X75X0.89MM SYS 6 6125-089-075

## (undated) DEVICE — GLOVE PROTEXIS BLUE W/NEU-THERA 8.0  2D73EB80

## (undated) DEVICE — LINEN TOWEL PACK X10 5473

## (undated) DEVICE — HOOD FLYTE W/PEELAWAY 408-800-100

## (undated) DEVICE — BAG CLEAR TRASH 1.3M 39X33" P4040C

## (undated) DEVICE — PREP SCRUB SOL EXIDINE 4% CHG 4OZ 29002-404

## (undated) DEVICE — DRSG AQUACEL AG 3.5X12" HYDROFIBER 420670

## (undated) DEVICE — SOL NACL 0.9% IRRIG 3000ML BAG 2B7477

## (undated) DEVICE — DRSG AQUACEL AG HYDROFIBER  3.5X10" 422605

## (undated) DEVICE — KIT ENDO TURNOVER/PROCEDURE W/CLEAN A SCOPE LINERS 103888

## (undated) DEVICE — BONE CEMENT MIXEVAC III HI VAC KIT  0206-015-000

## (undated) DEVICE — SUCTION TIP YANKAUER W/O VENT K86

## (undated) DEVICE — BLADE CLIPPER 3M 9670

## (undated) DEVICE — GLOVE PROTEXIS POWDER FREE SMT 8.0  2D72PT80X

## (undated) DEVICE — LINEN FULL SHEET 5511

## (undated) DEVICE — SU STRATAFIX PDS PLUS 1 CT-1 12" SXPP1A443

## (undated) DEVICE — DRAIN JACKSON PRATT 15FR ROUND SIL LF JP-2229

## (undated) DEVICE — SU VICRYL+ 1 MO-4 18" DYED VCP702D

## (undated) DEVICE — LINEN TOWEL PACK X5 5464

## (undated) DEVICE — LINEN DRAPE 54X72" 5467

## (undated) DEVICE — SU VICRYL 3-0 SH 27" J316H

## (undated) DEVICE — LINEN ORTHO ACL PACK 5447

## (undated) DEVICE — PACK MINOR CUSTOM RIDGES SBA32RMRMA

## (undated) DEVICE — GLOVE PROTEXIS POWDER FREE 7.0 ORTHOPEDIC 2D73ET70

## (undated) DEVICE — CLEANSER JET LAVAGE IRRISEPT 0.05% CHG IRRISEPT45USA

## (undated) DEVICE — SET HANDPIECE INTERPULSE W/COAXIAL FAN SPRAY TIP 0210118000

## (undated) DEVICE — BLADE CLIPPER SGL USE 9680

## (undated) DEVICE — DRAPE STERI U 1015

## (undated) DEVICE — SUTURE MONOCRYL+ 2-0 CT-1 36" UNDYED MCP945H

## (undated) DEVICE — PACK TOTAL KNEE BOXED LATEX FREE PO15TKFCT

## (undated) DEVICE — CAST PADDING 6" STERILE 9046S

## (undated) DEVICE — DRAPE LAP W/ARMBOARD 29410

## (undated) DEVICE — GLOVE PROTEXIS POWDER FREE 7.5 ORTHOPEDIC 2D73ET75

## (undated) DEVICE — Device

## (undated) DEVICE — CLOSURE SYS SKIN PREMIERPRO EXOFIN FUSION 4X22CM STRL 3472

## (undated) DEVICE — PREP SKIN SCRUB TRAY 4461A

## (undated) DEVICE — SYR BULB IRRIG DOVER 60 ML LATEX FREE 67000

## (undated) DEVICE — PREP CHLORAPREP 26ML TINTED HI-LITE ORANGE 930815

## (undated) DEVICE — TOURNIQUET SGL  BLADDER 30"X4" BLUE 5921030135

## (undated) RX ORDER — CEFAZOLIN SODIUM/WATER 2 G/20 ML
SYRINGE (ML) INTRAVENOUS
Status: DISPENSED
Start: 2022-03-23

## (undated) RX ORDER — FENTANYL CITRATE 50 UG/ML
INJECTION, SOLUTION INTRAMUSCULAR; INTRAVENOUS
Status: DISPENSED
Start: 2021-10-18

## (undated) RX ORDER — HYDROMORPHONE HCL IN WATER/PF 6 MG/30 ML
PATIENT CONTROLLED ANALGESIA SYRINGE INTRAVENOUS
Status: DISPENSED
Start: 2021-10-18

## (undated) RX ORDER — FENTANYL CITRATE 50 UG/ML
INJECTION, SOLUTION INTRAMUSCULAR; INTRAVENOUS
Status: DISPENSED
Start: 2022-03-23

## (undated) RX ORDER — FENTANYL CITRATE 50 UG/ML
INJECTION, SOLUTION INTRAMUSCULAR; INTRAVENOUS
Status: DISPENSED
Start: 2024-10-15

## (undated) RX ORDER — CEFAZOLIN SODIUM/WATER 2 G/20 ML
SYRINGE (ML) INTRAVENOUS
Status: DISPENSED
Start: 2021-10-18

## (undated) RX ORDER — GLYCOPYRROLATE 0.2 MG/ML
INJECTION INTRAMUSCULAR; INTRAVENOUS
Status: DISPENSED
Start: 2022-03-23

## (undated) RX ORDER — PROPOFOL 10 MG/ML
INJECTION, EMULSION INTRAVENOUS
Status: DISPENSED
Start: 2021-10-18

## (undated) RX ORDER — BUPIVACAINE HYDROCHLORIDE AND EPINEPHRINE 2.5; 5 MG/ML; UG/ML
INJECTION, SOLUTION EPIDURAL; INFILTRATION; INTRACAUDAL; PERINEURAL
Status: DISPENSED
Start: 2022-03-23

## (undated) RX ORDER — ONDANSETRON 2 MG/ML
INJECTION INTRAMUSCULAR; INTRAVENOUS
Status: DISPENSED
Start: 2022-03-23

## (undated) RX ORDER — DEXAMETHASONE SODIUM PHOSPHATE 4 MG/ML
INJECTION, SOLUTION INTRA-ARTICULAR; INTRALESIONAL; INTRAMUSCULAR; INTRAVENOUS; SOFT TISSUE
Status: DISPENSED
Start: 2021-10-18

## (undated) RX ORDER — NEOSTIGMINE METHYLSULFATE 1 MG/ML
VIAL (ML) INJECTION
Status: DISPENSED
Start: 2022-03-23

## (undated) RX ORDER — GLYCOPYRROLATE 0.2 MG/ML
INJECTION INTRAMUSCULAR; INTRAVENOUS
Status: DISPENSED
Start: 2021-10-18

## (undated) RX ORDER — LIDOCAINE HYDROCHLORIDE 10 MG/ML
INJECTION, SOLUTION EPIDURAL; INFILTRATION; INTRACAUDAL; PERINEURAL
Status: DISPENSED
Start: 2022-03-23

## (undated) RX ORDER — PROPOFOL 10 MG/ML
INJECTION, EMULSION INTRAVENOUS
Status: DISPENSED
Start: 2022-03-23

## (undated) RX ORDER — VANCOMYCIN HYDROCHLORIDE 1 G/20ML
INJECTION, POWDER, LYOPHILIZED, FOR SOLUTION INTRAVENOUS
Status: DISPENSED
Start: 2021-10-18

## (undated) RX ORDER — TRANEXAMIC ACID 10 MG/ML
INJECTION, SOLUTION INTRAVENOUS
Status: DISPENSED
Start: 2021-10-18

## (undated) RX ORDER — REGADENOSON 0.08 MG/ML
INJECTION, SOLUTION INTRAVENOUS
Status: DISPENSED
Start: 2018-10-08

## (undated) RX ORDER — EPHEDRINE SULFATE 50 MG/ML
INJECTION, SOLUTION INTRAMUSCULAR; INTRAVENOUS; SUBCUTANEOUS
Status: DISPENSED
Start: 2022-03-23

## (undated) RX ORDER — ONDANSETRON 2 MG/ML
INJECTION INTRAMUSCULAR; INTRAVENOUS
Status: DISPENSED
Start: 2021-10-18

## (undated) RX ORDER — DEXAMETHASONE SODIUM PHOSPHATE 4 MG/ML
INJECTION, SOLUTION INTRA-ARTICULAR; INTRALESIONAL; INTRAMUSCULAR; INTRAVENOUS; SOFT TISSUE
Status: DISPENSED
Start: 2022-03-23